# Patient Record
Sex: MALE | Race: WHITE | NOT HISPANIC OR LATINO | Employment: OTHER | URBAN - METROPOLITAN AREA
[De-identification: names, ages, dates, MRNs, and addresses within clinical notes are randomized per-mention and may not be internally consistent; named-entity substitution may affect disease eponyms.]

---

## 2023-10-15 DIAGNOSIS — E11.9 TYPE 2 DIABETES MELLITUS WITHOUT COMPLICATION, WITH LONG-TERM CURRENT USE OF INSULIN (MULTI): ICD-10-CM

## 2023-10-15 DIAGNOSIS — Z79.4 TYPE 2 DIABETES MELLITUS WITHOUT COMPLICATION, WITH LONG-TERM CURRENT USE OF INSULIN (MULTI): ICD-10-CM

## 2023-10-16 RX ORDER — INSULIN ASPART 100 [IU]/ML
INJECTION, SOLUTION INTRAVENOUS; SUBCUTANEOUS
Qty: 50 ML | Refills: 1 | Status: SHIPPED | OUTPATIENT
Start: 2023-10-16 | End: 2024-06-03 | Stop reason: SDUPTHER

## 2023-10-20 DIAGNOSIS — Z79.4 TYPE 2 DIABETES MELLITUS WITHOUT COMPLICATION, WITH LONG-TERM CURRENT USE OF INSULIN (MULTI): ICD-10-CM

## 2023-10-20 DIAGNOSIS — E11.9 TYPE 2 DIABETES MELLITUS WITHOUT COMPLICATION, WITH LONG-TERM CURRENT USE OF INSULIN (MULTI): ICD-10-CM

## 2023-10-22 RX ORDER — HUMAN INSULIN 100 [IU]/ML
INJECTION, SUSPENSION SUBCUTANEOUS
Qty: 60 ML | Refills: 1 | Status: SHIPPED | OUTPATIENT
Start: 2023-10-22 | End: 2024-06-03 | Stop reason: SDUPTHER

## 2023-12-19 DIAGNOSIS — E08.00 DIABETES MELLITUS DUE TO UNDERLYING CONDITION WITH HYPEROSMOLARITY WITHOUT COMA, WITHOUT LONG-TERM CURRENT USE OF INSULIN (MULTI): ICD-10-CM

## 2023-12-20 RX ORDER — CALCIUM CITRATE/VITAMIN D3 200MG-6.25
TABLET ORAL
Qty: 400 STRIP | Refills: 1 | Status: SHIPPED | OUTPATIENT
Start: 2023-12-20 | End: 2024-06-03 | Stop reason: SDUPTHER

## 2024-01-01 DIAGNOSIS — E11.9 TYPE 2 DIABETES MELLITUS WITHOUT COMPLICATION, WITH LONG-TERM CURRENT USE OF INSULIN (MULTI): ICD-10-CM

## 2024-01-01 DIAGNOSIS — Z79.4 TYPE 2 DIABETES MELLITUS WITHOUT COMPLICATION, WITH LONG-TERM CURRENT USE OF INSULIN (MULTI): ICD-10-CM

## 2024-01-03 RX ORDER — PEN NEEDLE, DIABETIC 29 G X1/2"
NEEDLE, DISPOSABLE MISCELLANEOUS
Qty: 300 EACH | Refills: 2 | Status: SHIPPED | OUTPATIENT
Start: 2024-01-03 | End: 2024-06-03 | Stop reason: SDUPTHER

## 2024-06-03 ENCOUNTER — OFFICE VISIT (OUTPATIENT)
Dept: PRIMARY CARE | Facility: CLINIC | Age: 64
End: 2024-06-03
Payer: COMMERCIAL

## 2024-06-03 VITALS
WEIGHT: 142 LBS | SYSTOLIC BLOOD PRESSURE: 116 MMHG | TEMPERATURE: 97.5 F | DIASTOLIC BLOOD PRESSURE: 74 MMHG | BODY MASS INDEX: 19.88 KG/M2 | HEIGHT: 71 IN | OXYGEN SATURATION: 98 % | HEART RATE: 84 BPM

## 2024-06-03 DIAGNOSIS — E11.9 TYPE 2 DIABETES MELLITUS WITHOUT COMPLICATION, WITH LONG-TERM CURRENT USE OF INSULIN (MULTI): ICD-10-CM

## 2024-06-03 DIAGNOSIS — G62.9 NEUROPATHY: Primary | ICD-10-CM

## 2024-06-03 DIAGNOSIS — E08.00 DIABETES MELLITUS DUE TO UNDERLYING CONDITION WITH HYPEROSMOLARITY WITHOUT COMA, WITHOUT LONG-TERM CURRENT USE OF INSULIN (MULTI): ICD-10-CM

## 2024-06-03 DIAGNOSIS — L20.9 ATOPIC DERMATITIS, UNSPECIFIED TYPE: ICD-10-CM

## 2024-06-03 DIAGNOSIS — Z79.4 TYPE 2 DIABETES MELLITUS WITHOUT COMPLICATION, WITH LONG-TERM CURRENT USE OF INSULIN (MULTI): ICD-10-CM

## 2024-06-03 PROCEDURE — 3074F SYST BP LT 130 MM HG: CPT | Performed by: REGISTERED NURSE

## 2024-06-03 PROCEDURE — 1036F TOBACCO NON-USER: CPT | Performed by: REGISTERED NURSE

## 2024-06-03 PROCEDURE — 1160F RVW MEDS BY RX/DR IN RCRD: CPT | Performed by: REGISTERED NURSE

## 2024-06-03 PROCEDURE — 99213 OFFICE O/P EST LOW 20 MIN: CPT | Performed by: REGISTERED NURSE

## 2024-06-03 PROCEDURE — 1125F AMNT PAIN NOTED PAIN PRSNT: CPT | Performed by: REGISTERED NURSE

## 2024-06-03 PROCEDURE — 3078F DIAST BP <80 MM HG: CPT | Performed by: REGISTERED NURSE

## 2024-06-03 PROCEDURE — 1159F MED LIST DOCD IN RCRD: CPT | Performed by: REGISTERED NURSE

## 2024-06-03 RX ORDER — TRIAMCINOLONE ACETONIDE 1 MG/G
CREAM TOPICAL 2 TIMES DAILY
Qty: 15 G | Refills: 0 | Status: SHIPPED | OUTPATIENT
Start: 2024-06-03

## 2024-06-03 RX ORDER — GABAPENTIN 100 MG/1
100 CAPSULE ORAL 3 TIMES DAILY
Qty: 30 CAPSULE | Refills: 0 | Status: SHIPPED | OUTPATIENT
Start: 2024-06-03 | End: 2024-06-13

## 2024-06-03 RX ORDER — HUMAN INSULIN 100 [IU]/ML
INJECTION, SUSPENSION SUBCUTANEOUS
Qty: 60 ML | Refills: 1 | Status: SHIPPED | OUTPATIENT
Start: 2024-06-03

## 2024-06-03 RX ORDER — CALCIUM CITRATE/VITAMIN D3 200MG-6.25
TABLET ORAL
Qty: 400 STRIP | Refills: 1 | Status: SHIPPED | OUTPATIENT
Start: 2024-06-03

## 2024-06-03 RX ORDER — NAPROXEN SODIUM 220 MG
TABLET ORAL
Qty: 300 EACH | Refills: 2 | Status: SHIPPED | OUTPATIENT
Start: 2024-06-03

## 2024-06-03 RX ORDER — INSULIN ASPART 100 [IU]/ML
INJECTION, SOLUTION INTRAVENOUS; SUBCUTANEOUS
Qty: 50 ML | Refills: 1 | Status: SHIPPED | OUTPATIENT
Start: 2024-06-03

## 2024-06-03 ASSESSMENT — COLUMBIA-SUICIDE SEVERITY RATING SCALE - C-SSRS
1. IN THE PAST MONTH, HAVE YOU WISHED YOU WERE DEAD OR WISHED YOU COULD GO TO SLEEP AND NOT WAKE UP?: NO
2. HAVE YOU ACTUALLY HAD ANY THOUGHTS OF KILLING YOURSELF?: NO
6. HAVE YOU EVER DONE ANYTHING, STARTED TO DO ANYTHING, OR PREPARED TO DO ANYTHING TO END YOUR LIFE?: NO

## 2024-06-03 ASSESSMENT — ENCOUNTER SYMPTOMS
OCCASIONAL FEELINGS OF UNSTEADINESS: 0
LOSS OF SENSATION IN FEET: 0
DEPRESSION: 0

## 2024-06-03 ASSESSMENT — PAIN SCALES - GENERAL: PAINLEVEL: 8

## 2024-06-03 ASSESSMENT — PATIENT HEALTH QUESTIONNAIRE - PHQ9
2. FEELING DOWN, DEPRESSED OR HOPELESS: NOT AT ALL
1. LITTLE INTEREST OR PLEASURE IN DOING THINGS: NOT AT ALL
SUM OF ALL RESPONSES TO PHQ9 QUESTIONS 1 AND 2: 0

## 2024-06-03 NOTE — PROGRESS NOTES
"Subjective   Patient ID: Devin Gomez is a 64 y.o. male who presents for Rash (Right shoulder itching, pain, x month, went away for a week, has had for a 3 weeks now. ) and Med Refill (Novolin n refills/True metrix test strips refills).    Rash    Med Refill  Associated symptoms include a rash.      Pt has atopic derm to shoulder area  Review of Systems   Skin:  Positive for rash.   All other systems reviewed and are negative.      Objective   /74 (BP Location: Right arm, Patient Position: Sitting, BP Cuff Size: Adult)   Pulse 84   Temp 36.4 °C (97.5 °F) (Temporal)   Ht 1.803 m (5' 11\")   Wt 64.4 kg (142 lb)   SpO2 98%   BMI 19.80 kg/m²     Physical Exam  Vitals reviewed.   Constitutional:       Appearance: Normal appearance.   Skin:     General: Skin is warm.      Findings: Rash present.   Neurological:      Mental Status: He is alert.   Psychiatric:         Mood and Affect: Mood normal.         Behavior: Behavior normal.         Assessment/Plan   Problem List Items Addressed This Visit    None  Visit Diagnoses         Codes    Neuropathy    -  Primary G62.9    Diabetes mellitus due to underlying condition with hyperosmolarity without coma, without long-term current use of insulin (Multi)     E08.00    Type 2 diabetes mellitus without complication, with long-term current use of insulin (Multi)     E11.9, Z79.4               "

## 2024-10-01 DIAGNOSIS — L20.9 ATOPIC DERMATITIS, UNSPECIFIED TYPE: ICD-10-CM

## 2024-10-02 RX ORDER — TRIAMCINOLONE ACETONIDE 1 MG/G
CREAM TOPICAL 2 TIMES DAILY
Qty: 15 G | Refills: 0 | Status: SHIPPED | OUTPATIENT
Start: 2024-10-02

## 2025-01-21 ENCOUNTER — APPOINTMENT (OUTPATIENT)
Dept: RADIOLOGY | Facility: HOSPITAL | Age: 65
End: 2025-01-21
Payer: COMMERCIAL

## 2025-01-21 ENCOUNTER — HOSPITAL ENCOUNTER (INPATIENT)
Facility: HOSPITAL | Age: 65
End: 2025-01-21
Attending: STUDENT IN AN ORGANIZED HEALTH CARE EDUCATION/TRAINING PROGRAM | Admitting: NURSE PRACTITIONER
Payer: COMMERCIAL

## 2025-01-21 ENCOUNTER — APPOINTMENT (OUTPATIENT)
Dept: CARDIOLOGY | Facility: HOSPITAL | Age: 65
End: 2025-01-21
Payer: COMMERCIAL

## 2025-01-21 DIAGNOSIS — R55 SYNCOPE AND COLLAPSE: ICD-10-CM

## 2025-01-21 DIAGNOSIS — I21.4 NSTEMI (NON-ST ELEVATED MYOCARDIAL INFARCTION) (MULTI): ICD-10-CM

## 2025-01-21 DIAGNOSIS — K92.1 MELENA: Primary | ICD-10-CM

## 2025-01-21 DIAGNOSIS — R11.2 INTRACTABLE NAUSEA AND VOMITING: ICD-10-CM

## 2025-01-21 DIAGNOSIS — R73.9 HYPERGLYCEMIA: ICD-10-CM

## 2025-01-21 DIAGNOSIS — E86.0 DEHYDRATION: ICD-10-CM

## 2025-01-21 LAB
ALBUMIN SERPL BCP-MCNC: 4.5 G/DL (ref 3.4–5)
ALP SERPL-CCNC: 88 U/L (ref 33–136)
ALT SERPL W P-5'-P-CCNC: 32 U/L (ref 10–52)
ANION GAP BLDA CALCULATED.4IONS-SCNC: 15 MMO/L (ref 10–25)
ANION GAP BLDV CALCULATED.4IONS-SCNC: 15 MMOL/L (ref 10–25)
ANION GAP SERPL CALCULATED.3IONS-SCNC: 22 MMOL/L (ref 10–20)
ANION GAP SERPL CALCULATED.3IONS-SCNC: 22 MMOL/L (ref 10–20)
APPEARANCE UR: CLEAR
ARTERIAL PATENCY WRIST A: ABNORMAL
AST SERPL W P-5'-P-CCNC: 17 U/L (ref 9–39)
B-OH-BUTYR SERPL-SCNC: 2.47 MMOL/L (ref 0.02–0.27)
BASE EXCESS BLDA CALC-SCNC: -3.5 MMOL/L (ref -2–3)
BASE EXCESS BLDV CALC-SCNC: -8.4 MMOL/L (ref -2–3)
BASOPHILS # BLD AUTO: 0.02 X10*3/UL (ref 0–0.1)
BASOPHILS NFR BLD AUTO: 0.2 %
BILIRUB SERPL-MCNC: 1.1 MG/DL (ref 0–1.2)
BILIRUB UR STRIP.AUTO-MCNC: NEGATIVE MG/DL
BNP SERPL-MCNC: 303 PG/ML (ref 0–99)
BODY TEMPERATURE: 37 DEGREES CELSIUS
BODY TEMPERATURE: 37 DEGREES CELSIUS
BUN SERPL-MCNC: 23 MG/DL (ref 6–23)
BUN SERPL-MCNC: 23 MG/DL (ref 6–23)
CA-I BLDA-SCNC: 1.19 MMOL/L (ref 1.1–1.33)
CA-I BLDV-SCNC: 1.15 MMOL/L (ref 1.1–1.33)
CALCIUM SERPL-MCNC: 9 MG/DL (ref 8.6–10.3)
CALCIUM SERPL-MCNC: 9.8 MG/DL (ref 8.6–10.3)
CARDIAC TROPONIN I PNL SERPL HS: 16 NG/L (ref 0–20)
CARDIAC TROPONIN I PNL SERPL HS: 18 NG/L (ref 0–20)
CARDIAC TROPONIN I PNL SERPL HS: 59 NG/L (ref 0–20)
CHLORIDE BLDA-SCNC: 101 MMOL/L (ref 98–107)
CHLORIDE BLDV-SCNC: 105 MMOL/L (ref 98–107)
CHLORIDE SERPL-SCNC: 100 MMOL/L (ref 98–107)
CHLORIDE SERPL-SCNC: 99 MMOL/L (ref 98–107)
CO2 SERPL-SCNC: 16 MMOL/L (ref 21–32)
CO2 SERPL-SCNC: 18 MMOL/L (ref 21–32)
COLOR UR: COLORLESS
CREAT SERPL-MCNC: 1.13 MG/DL (ref 0.5–1.3)
CREAT SERPL-MCNC: 1.27 MG/DL (ref 0.5–1.3)
EGFRCR SERPLBLD CKD-EPI 2021: 62 ML/MIN/1.73M*2
EGFRCR SERPLBLD CKD-EPI 2021: 72 ML/MIN/1.73M*2
EOSINOPHIL # BLD AUTO: 0 X10*3/UL (ref 0–0.7)
EOSINOPHIL NFR BLD AUTO: 0 %
ERYTHROCYTE [DISTWIDTH] IN BLOOD BY AUTOMATED COUNT: 12.7 % (ref 11.5–14.5)
FLUAV RNA RESP QL NAA+PROBE: NOT DETECTED
FLUBV RNA RESP QL NAA+PROBE: NOT DETECTED
GLUCOSE BLD MANUAL STRIP-MCNC: 283 MG/DL (ref 74–99)
GLUCOSE BLD MANUAL STRIP-MCNC: 295 MG/DL (ref 74–99)
GLUCOSE BLD MANUAL STRIP-MCNC: 297 MG/DL (ref 74–99)
GLUCOSE BLD MANUAL STRIP-MCNC: 319 MG/DL (ref 74–99)
GLUCOSE BLD MANUAL STRIP-MCNC: 319 MG/DL (ref 74–99)
GLUCOSE BLDA-MCNC: 362 MG/DL (ref 74–99)
GLUCOSE BLDV-MCNC: 323 MG/DL (ref 74–99)
GLUCOSE SERPL-MCNC: 336 MG/DL (ref 74–99)
GLUCOSE SERPL-MCNC: 348 MG/DL (ref 74–99)
GLUCOSE UR STRIP.AUTO-MCNC: ABNORMAL MG/DL
HCO3 BLDA-SCNC: 18.6 MMOL/L (ref 22–26)
HCO3 BLDV-SCNC: 16.2 MMOL/L (ref 22–26)
HCT VFR BLD AUTO: 39.8 % (ref 41–52)
HCT VFR BLD EST: 36 % (ref 41–52)
HCT VFR BLD EST: 36 % (ref 41–52)
HGB BLD-MCNC: 13.5 G/DL (ref 13.5–17.5)
HGB BLDA-MCNC: 12.1 G/DL (ref 13.5–17.5)
HGB BLDV-MCNC: 12.1 G/DL (ref 13.5–17.5)
IMM GRANULOCYTES # BLD AUTO: 0.03 X10*3/UL (ref 0–0.7)
IMM GRANULOCYTES NFR BLD AUTO: 0.3 % (ref 0–0.9)
INHALED O2 CONCENTRATION: 21 %
INHALED O2 CONCENTRATION: 21 %
KETONES UR STRIP.AUTO-MCNC: ABNORMAL MG/DL
LACTATE BLDA-SCNC: 3.3 MMOL/L (ref 0.4–2)
LACTATE BLDV-SCNC: 3.9 MMOL/L (ref 0.4–2)
LACTATE BLDV-SCNC: 4.1 MMOL/L (ref 0.4–2)
LACTATE BLDV-SCNC: 4.3 MMOL/L (ref 0.4–2)
LACTATE BLDV-SCNC: 5.1 MMOL/L (ref 0.4–2)
LACTATE SERPL-SCNC: 3.6 MMOL/L (ref 0.4–2)
LEUKOCYTE ESTERASE UR QL STRIP.AUTO: NEGATIVE
LIPASE SERPL-CCNC: 5 U/L (ref 9–82)
LYMPHOCYTES # BLD AUTO: 0.61 X10*3/UL (ref 1.2–4.8)
LYMPHOCYTES NFR BLD AUTO: 7 %
MCH RBC QN AUTO: 28.4 PG (ref 26–34)
MCHC RBC AUTO-ENTMCNC: 33.9 G/DL (ref 32–36)
MCV RBC AUTO: 84 FL (ref 80–100)
MONOCYTES # BLD AUTO: 0.29 X10*3/UL (ref 0.1–1)
MONOCYTES NFR BLD AUTO: 3.3 %
NEUTROPHILS # BLD AUTO: 7.79 X10*3/UL (ref 1.2–7.7)
NEUTROPHILS NFR BLD AUTO: 89.2 %
NITRITE UR QL STRIP.AUTO: NEGATIVE
NRBC BLD-RTO: 0 /100 WBCS (ref 0–0)
OXYHGB MFR BLDA: 96.8 % (ref 94–98)
OXYHGB MFR BLDV: 94.8 % (ref 45–75)
PCO2 BLDA: 25 MM HG (ref 38–42)
PCO2 BLDV: 30 MM HG (ref 41–51)
PH BLDA: 7.48 PH (ref 7.38–7.42)
PH BLDV: 7.34 PH (ref 7.33–7.43)
PH UR STRIP.AUTO: 5.5 [PH]
PLATELET # BLD AUTO: 192 X10*3/UL (ref 150–450)
PO2 BLDA: 102 MM HG (ref 85–95)
PO2 BLDV: 73 MM HG (ref 35–45)
POTASSIUM BLDA-SCNC: 4.9 MMOL/L (ref 3.5–5.3)
POTASSIUM BLDV-SCNC: 4.2 MMOL/L (ref 3.5–5.3)
POTASSIUM SERPL-SCNC: 4.5 MMOL/L (ref 3.5–5.3)
POTASSIUM SERPL-SCNC: 4.6 MMOL/L (ref 3.5–5.3)
PROT SERPL-MCNC: 7.2 G/DL (ref 6.4–8.2)
PROT UR STRIP.AUTO-MCNC: NEGATIVE MG/DL
RBC # BLD AUTO: 4.76 X10*6/UL (ref 4.5–5.9)
RBC # UR STRIP.AUTO: NEGATIVE /UL
RSV RNA RESP QL NAA+PROBE: NOT DETECTED
SAO2 % BLDA: 99 % (ref 94–100)
SAO2 % BLDV: 96 % (ref 45–75)
SARS-COV-2 RNA RESP QL NAA+PROBE: NOT DETECTED
SODIUM BLDA-SCNC: 130 MMOL/L (ref 136–145)
SODIUM BLDV-SCNC: 132 MMOL/L (ref 136–145)
SODIUM SERPL-SCNC: 133 MMOL/L (ref 136–145)
SODIUM SERPL-SCNC: 134 MMOL/L (ref 136–145)
SP GR UR STRIP.AUTO: 1.02
SPECIMEN DRAWN FROM PATIENT: ABNORMAL
TSH SERPL-ACNC: 2.2 MIU/L (ref 0.44–3.98)
UROBILINOGEN UR STRIP.AUTO-MCNC: NORMAL MG/DL
WBC # BLD AUTO: 8.7 X10*3/UL (ref 4.4–11.3)

## 2025-01-21 PROCEDURE — 74177 CT ABD & PELVIS W/CONTRAST: CPT

## 2025-01-21 PROCEDURE — 99291 CRITICAL CARE FIRST HOUR: CPT | Mod: 25 | Performed by: STUDENT IN AN ORGANIZED HEALTH CARE EDUCATION/TRAINING PROGRAM

## 2025-01-21 PROCEDURE — 99285 EMERGENCY DEPT VISIT HI MDM: CPT | Mod: 25 | Performed by: STUDENT IN AN ORGANIZED HEALTH CARE EDUCATION/TRAINING PROGRAM

## 2025-01-21 PROCEDURE — 96361 HYDRATE IV INFUSION ADD-ON: CPT

## 2025-01-21 PROCEDURE — 82947 ASSAY GLUCOSE BLOOD QUANT: CPT

## 2025-01-21 PROCEDURE — 71045 X-RAY EXAM CHEST 1 VIEW: CPT

## 2025-01-21 PROCEDURE — 83605 ASSAY OF LACTIC ACID: CPT | Performed by: STUDENT IN AN ORGANIZED HEALTH CARE EDUCATION/TRAINING PROGRAM

## 2025-01-21 PROCEDURE — 80053 COMPREHEN METABOLIC PANEL: CPT | Performed by: STUDENT IN AN ORGANIZED HEALTH CARE EDUCATION/TRAINING PROGRAM

## 2025-01-21 PROCEDURE — 82010 KETONE BODYS QUAN: CPT | Performed by: STUDENT IN AN ORGANIZED HEALTH CARE EDUCATION/TRAINING PROGRAM

## 2025-01-21 PROCEDURE — 93005 ELECTROCARDIOGRAM TRACING: CPT

## 2025-01-21 PROCEDURE — 2500000004 HC RX 250 GENERAL PHARMACY W/ HCPCS (ALT 636 FOR OP/ED): Performed by: NURSE PRACTITIONER

## 2025-01-21 PROCEDURE — 84132 ASSAY OF SERUM POTASSIUM: CPT | Performed by: STUDENT IN AN ORGANIZED HEALTH CARE EDUCATION/TRAINING PROGRAM

## 2025-01-21 PROCEDURE — 2550000001 HC RX 255 CONTRASTS: Performed by: STUDENT IN AN ORGANIZED HEALTH CARE EDUCATION/TRAINING PROGRAM

## 2025-01-21 PROCEDURE — 83605 ASSAY OF LACTIC ACID: CPT | Performed by: NURSE PRACTITIONER

## 2025-01-21 PROCEDURE — 81003 URINALYSIS AUTO W/O SCOPE: CPT | Performed by: STUDENT IN AN ORGANIZED HEALTH CARE EDUCATION/TRAINING PROGRAM

## 2025-01-21 PROCEDURE — 2060000001 HC INTERMEDIATE ICU ROOM DAILY

## 2025-01-21 PROCEDURE — 85025 COMPLETE CBC W/AUTO DIFF WBC: CPT | Performed by: STUDENT IN AN ORGANIZED HEALTH CARE EDUCATION/TRAINING PROGRAM

## 2025-01-21 PROCEDURE — 96376 TX/PRO/DX INJ SAME DRUG ADON: CPT

## 2025-01-21 PROCEDURE — 84484 ASSAY OF TROPONIN QUANT: CPT | Performed by: STUDENT IN AN ORGANIZED HEALTH CARE EDUCATION/TRAINING PROGRAM

## 2025-01-21 PROCEDURE — 83036 HEMOGLOBIN GLYCOSYLATED A1C: CPT | Mod: TRILAB | Performed by: NURSE PRACTITIONER

## 2025-01-21 PROCEDURE — 84484 ASSAY OF TROPONIN QUANT: CPT | Performed by: NURSE PRACTITIONER

## 2025-01-21 PROCEDURE — 36415 COLL VENOUS BLD VENIPUNCTURE: CPT | Performed by: STUDENT IN AN ORGANIZED HEALTH CARE EDUCATION/TRAINING PROGRAM

## 2025-01-21 PROCEDURE — 71045 X-RAY EXAM CHEST 1 VIEW: CPT | Mod: FOREIGN READ | Performed by: RADIOLOGY

## 2025-01-21 PROCEDURE — 84132 ASSAY OF SERUM POTASSIUM: CPT | Performed by: NURSE PRACTITIONER

## 2025-01-21 PROCEDURE — 96375 TX/PRO/DX INJ NEW DRUG ADDON: CPT

## 2025-01-21 PROCEDURE — 84443 ASSAY THYROID STIM HORMONE: CPT | Performed by: STUDENT IN AN ORGANIZED HEALTH CARE EDUCATION/TRAINING PROGRAM

## 2025-01-21 PROCEDURE — 83690 ASSAY OF LIPASE: CPT | Performed by: STUDENT IN AN ORGANIZED HEALTH CARE EDUCATION/TRAINING PROGRAM

## 2025-01-21 PROCEDURE — 87075 CULTR BACTERIA EXCEPT BLOOD: CPT | Mod: TRILAB | Performed by: NURSE PRACTITIONER

## 2025-01-21 PROCEDURE — 87637 SARSCOV2&INF A&B&RSV AMP PRB: CPT | Performed by: STUDENT IN AN ORGANIZED HEALTH CARE EDUCATION/TRAINING PROGRAM

## 2025-01-21 PROCEDURE — 2500000004 HC RX 250 GENERAL PHARMACY W/ HCPCS (ALT 636 FOR OP/ED): Performed by: STUDENT IN AN ORGANIZED HEALTH CARE EDUCATION/TRAINING PROGRAM

## 2025-01-21 PROCEDURE — 74177 CT ABD & PELVIS W/CONTRAST: CPT | Mod: FOREIGN READ | Performed by: RADIOLOGY

## 2025-01-21 PROCEDURE — 2500000004 HC RX 250 GENERAL PHARMACY W/ HCPCS (ALT 636 FOR OP/ED)

## 2025-01-21 PROCEDURE — 2500000004 HC RX 250 GENERAL PHARMACY W/ HCPCS (ALT 636 FOR OP/ED): Performed by: REGISTERED NURSE

## 2025-01-21 PROCEDURE — 83605 ASSAY OF LACTIC ACID: CPT

## 2025-01-21 PROCEDURE — 99223 1ST HOSP IP/OBS HIGH 75: CPT | Performed by: NURSE PRACTITIONER

## 2025-01-21 PROCEDURE — 36600 WITHDRAWAL OF ARTERIAL BLOOD: CPT

## 2025-01-21 PROCEDURE — 96374 THER/PROPH/DIAG INJ IV PUSH: CPT

## 2025-01-21 PROCEDURE — 83880 ASSAY OF NATRIURETIC PEPTIDE: CPT | Performed by: STUDENT IN AN ORGANIZED HEALTH CARE EDUCATION/TRAINING PROGRAM

## 2025-01-21 RX ORDER — ACETAMINOPHEN 160 MG/5ML
650 SOLUTION ORAL EVERY 4 HOURS PRN
Status: DISCONTINUED | OUTPATIENT
Start: 2025-01-21 | End: 2025-02-05 | Stop reason: HOSPADM

## 2025-01-21 RX ORDER — ACETAMINOPHEN 500 MG
5 TABLET ORAL NIGHTLY PRN
Status: DISCONTINUED | OUTPATIENT
Start: 2025-01-21 | End: 2025-02-05 | Stop reason: HOSPADM

## 2025-01-21 RX ORDER — ONDANSETRON 4 MG/1
4 TABLET, ORALLY DISINTEGRATING ORAL EVERY 8 HOURS PRN
Status: DISCONTINUED | OUTPATIENT
Start: 2025-01-21 | End: 2025-02-05 | Stop reason: HOSPADM

## 2025-01-21 RX ORDER — ONDANSETRON HYDROCHLORIDE 2 MG/ML
4 INJECTION, SOLUTION INTRAVENOUS EVERY 8 HOURS PRN
Status: DISCONTINUED | OUTPATIENT
Start: 2025-01-21 | End: 2025-02-05 | Stop reason: HOSPADM

## 2025-01-21 RX ORDER — ACETAMINOPHEN 325 MG/1
650 TABLET ORAL EVERY 4 HOURS PRN
Status: DISCONTINUED | OUTPATIENT
Start: 2025-01-21 | End: 2025-02-05 | Stop reason: HOSPADM

## 2025-01-21 RX ORDER — ONDANSETRON HYDROCHLORIDE 2 MG/ML
4 INJECTION, SOLUTION INTRAVENOUS ONCE
Status: COMPLETED | OUTPATIENT
Start: 2025-01-21 | End: 2025-01-21

## 2025-01-21 RX ORDER — DEXTROSE 50 % IN WATER (D50W) INTRAVENOUS SYRINGE
12.5
Status: DISCONTINUED | OUTPATIENT
Start: 2025-01-21 | End: 2025-02-03

## 2025-01-21 RX ORDER — SODIUM CHLORIDE 450 MG/100ML
150 INJECTION, SOLUTION INTRAVENOUS CONTINUOUS
Status: DISCONTINUED | OUTPATIENT
Start: 2025-01-21 | End: 2025-01-22

## 2025-01-21 RX ORDER — ACETAMINOPHEN 650 MG/1
650 SUPPOSITORY RECTAL EVERY 4 HOURS PRN
Status: DISCONTINUED | OUTPATIENT
Start: 2025-01-21 | End: 2025-02-05 | Stop reason: HOSPADM

## 2025-01-21 RX ORDER — PROCHLORPERAZINE EDISYLATE 5 MG/ML
5 INJECTION INTRAMUSCULAR; INTRAVENOUS ONCE
Status: COMPLETED | OUTPATIENT
Start: 2025-01-21 | End: 2025-01-21

## 2025-01-21 RX ORDER — DEXTROSE 50 % IN WATER (D50W) INTRAVENOUS SYRINGE
25
Status: DISCONTINUED | OUTPATIENT
Start: 2025-01-21 | End: 2025-02-03

## 2025-01-21 RX ORDER — HYDRALAZINE HYDROCHLORIDE 20 MG/ML
5 INJECTION INTRAMUSCULAR; INTRAVENOUS EVERY 4 HOURS PRN
Status: DISCONTINUED | OUTPATIENT
Start: 2025-01-21 | End: 2025-02-05 | Stop reason: HOSPADM

## 2025-01-21 RX ORDER — DEXTROSE 50 % IN WATER (D50W) INTRAVENOUS SYRINGE
12.5
Status: DISCONTINUED | OUTPATIENT
Start: 2025-01-21 | End: 2025-01-21

## 2025-01-21 RX ORDER — ENOXAPARIN SODIUM 100 MG/ML
40 INJECTION SUBCUTANEOUS DAILY
Status: DISCONTINUED | OUTPATIENT
Start: 2025-01-21 | End: 2025-01-22

## 2025-01-21 RX ORDER — DEXTROSE 50 % IN WATER (D50W) INTRAVENOUS SYRINGE
25
Status: DISCONTINUED | OUTPATIENT
Start: 2025-01-21 | End: 2025-01-21

## 2025-01-21 RX ORDER — INSULIN LISPRO 100 [IU]/ML
0-10 INJECTION, SOLUTION INTRAVENOUS; SUBCUTANEOUS
Status: DISCONTINUED | OUTPATIENT
Start: 2025-01-22 | End: 2025-01-22

## 2025-01-21 RX ORDER — METOCLOPRAMIDE HYDROCHLORIDE 5 MG/ML
10 INJECTION INTRAMUSCULAR; INTRAVENOUS ONCE
Status: COMPLETED | OUTPATIENT
Start: 2025-01-21 | End: 2025-01-21

## 2025-01-21 RX ADMIN — PIPERACILLIN SODIUM AND TAZOBACTAM SODIUM 3.38 G: 3; .375 INJECTION, SOLUTION INTRAVENOUS at 20:53

## 2025-01-21 RX ADMIN — ONDANSETRON 4 MG: 2 INJECTION INTRAMUSCULAR; INTRAVENOUS at 12:06

## 2025-01-21 RX ADMIN — PROMETHAZINE HYDROCHLORIDE 12.5 MG: 25 INJECTION INTRAMUSCULAR; INTRAVENOUS at 22:51

## 2025-01-21 RX ADMIN — SODIUM CHLORIDE, SODIUM LACTATE, POTASSIUM CHLORIDE, AND CALCIUM CHLORIDE 1000 ML: 600; 310; 30; 20 INJECTION, SOLUTION INTRAVENOUS at 13:22

## 2025-01-21 RX ADMIN — METOCLOPRAMIDE 10 MG: 5 INJECTION, SOLUTION INTRAMUSCULAR; INTRAVENOUS at 12:45

## 2025-01-21 RX ADMIN — SODIUM CHLORIDE 1000 ML: 900 INJECTION, SOLUTION INTRAVENOUS at 12:06

## 2025-01-21 RX ADMIN — SODIUM CHLORIDE, POTASSIUM CHLORIDE, SODIUM LACTATE AND CALCIUM CHLORIDE 1000 ML: 600; 310; 30; 20 INJECTION, SOLUTION INTRAVENOUS at 17:18

## 2025-01-21 RX ADMIN — PROCHLORPERAZINE EDISYLATE 5 MG: 5 INJECTION INTRAMUSCULAR; INTRAVENOUS at 18:08

## 2025-01-21 RX ADMIN — IOHEXOL 75 ML: 350 INJECTION, SOLUTION INTRAVENOUS at 13:43

## 2025-01-21 RX ADMIN — ONDANSETRON 4 MG: 2 INJECTION INTRAMUSCULAR; INTRAVENOUS at 16:42

## 2025-01-21 RX ADMIN — SODIUM CHLORIDE 150 ML/HR: 450 INJECTION, SOLUTION INTRAVENOUS at 20:20

## 2025-01-21 SDOH — ECONOMIC STABILITY: FOOD INSECURITY: WITHIN THE PAST 12 MONTHS, THE FOOD YOU BOUGHT JUST DIDN'T LAST AND YOU DIDN'T HAVE MONEY TO GET MORE.: NEVER TRUE

## 2025-01-21 SDOH — ECONOMIC STABILITY: HOUSING INSECURITY: AT ANY TIME IN THE PAST 12 MONTHS, WERE YOU HOMELESS OR LIVING IN A SHELTER (INCLUDING NOW)?: NO

## 2025-01-21 SDOH — SOCIAL STABILITY: SOCIAL INSECURITY: DO YOU FEEL ANYONE HAS EXPLOITED OR TAKEN ADVANTAGE OF YOU FINANCIALLY OR OF YOUR PERSONAL PROPERTY?: NO

## 2025-01-21 SDOH — ECONOMIC STABILITY: FOOD INSECURITY: HOW HARD IS IT FOR YOU TO PAY FOR THE VERY BASICS LIKE FOOD, HOUSING, MEDICAL CARE, AND HEATING?: NOT VERY HARD

## 2025-01-21 SDOH — SOCIAL STABILITY: SOCIAL INSECURITY: WERE YOU ABLE TO COMPLETE ALL THE BEHAVIORAL HEALTH SCREENINGS?: YES

## 2025-01-21 SDOH — ECONOMIC STABILITY: HOUSING INSECURITY: IN THE PAST 12 MONTHS, HOW MANY TIMES HAVE YOU MOVED WHERE YOU WERE LIVING?: 0

## 2025-01-21 SDOH — ECONOMIC STABILITY: FOOD INSECURITY: WITHIN THE PAST 12 MONTHS, YOU WORRIED THAT YOUR FOOD WOULD RUN OUT BEFORE YOU GOT THE MONEY TO BUY MORE.: NEVER TRUE

## 2025-01-21 SDOH — SOCIAL STABILITY: SOCIAL INSECURITY: HAS ANYONE EVER THREATENED TO HURT YOUR FAMILY OR YOUR PETS?: NO

## 2025-01-21 SDOH — SOCIAL STABILITY: SOCIAL INSECURITY: HAVE YOU HAD THOUGHTS OF HARMING ANYONE ELSE?: NO

## 2025-01-21 SDOH — ECONOMIC STABILITY: TRANSPORTATION INSECURITY: IN THE PAST 12 MONTHS, HAS LACK OF TRANSPORTATION KEPT YOU FROM MEDICAL APPOINTMENTS OR FROM GETTING MEDICATIONS?: NO

## 2025-01-21 SDOH — SOCIAL STABILITY: SOCIAL INSECURITY: ARE THERE ANY APPARENT SIGNS OF INJURIES/BEHAVIORS THAT COULD BE RELATED TO ABUSE/NEGLECT?: NO

## 2025-01-21 SDOH — ECONOMIC STABILITY: HOUSING INSECURITY: IN THE LAST 12 MONTHS, WAS THERE A TIME WHEN YOU WERE NOT ABLE TO PAY THE MORTGAGE OR RENT ON TIME?: NO

## 2025-01-21 SDOH — SOCIAL STABILITY: SOCIAL INSECURITY: DOES ANYONE TRY TO KEEP YOU FROM HAVING/CONTACTING OTHER FRIENDS OR DOING THINGS OUTSIDE YOUR HOME?: NO

## 2025-01-21 SDOH — SOCIAL STABILITY: SOCIAL INSECURITY: ABUSE: ADULT

## 2025-01-21 SDOH — SOCIAL STABILITY: SOCIAL INSECURITY: DO YOU FEEL UNSAFE GOING BACK TO THE PLACE WHERE YOU ARE LIVING?: NO

## 2025-01-21 SDOH — SOCIAL STABILITY: SOCIAL INSECURITY: ARE YOU OR HAVE YOU BEEN THREATENED OR ABUSED PHYSICALLY, EMOTIONALLY, OR SEXUALLY BY ANYONE?: NO

## 2025-01-21 SDOH — SOCIAL STABILITY: SOCIAL INSECURITY: HAVE YOU HAD ANY THOUGHTS OF HARMING ANYONE ELSE?: NO

## 2025-01-21 ASSESSMENT — PAIN SCALES - GENERAL
PAINLEVEL_OUTOF10: 0 - NO PAIN
PAINLEVEL_OUTOF10: 0 - NO PAIN

## 2025-01-21 ASSESSMENT — PATIENT HEALTH QUESTIONNAIRE - PHQ9
2. FEELING DOWN, DEPRESSED OR HOPELESS: NOT AT ALL
1. LITTLE INTEREST OR PLEASURE IN DOING THINGS: NOT AT ALL
SUM OF ALL RESPONSES TO PHQ9 QUESTIONS 1 & 2: 0

## 2025-01-21 ASSESSMENT — LIFESTYLE VARIABLES
AUDIT-C TOTAL SCORE: 0
SUBSTANCE_ABUSE_PAST_12_MONTHS: NO
HOW OFTEN DO YOU HAVE A DRINK CONTAINING ALCOHOL: NEVER
HOW MANY STANDARD DRINKS CONTAINING ALCOHOL DO YOU HAVE ON A TYPICAL DAY: PATIENT DOES NOT DRINK
HOW OFTEN DO YOU HAVE 6 OR MORE DRINKS ON ONE OCCASION: NEVER
PRESCIPTION_ABUSE_PAST_12_MONTHS: NO
SKIP TO QUESTIONS 9-10: 1
AUDIT-C TOTAL SCORE: 0

## 2025-01-21 ASSESSMENT — ACTIVITIES OF DAILY LIVING (ADL)
LACK_OF_TRANSPORTATION: NO
HEARING - LEFT EAR: FUNCTIONAL
HEARING - RIGHT EAR: FUNCTIONAL
FEEDING YOURSELF: INDEPENDENT
BATHING: INDEPENDENT
GROOMING: INDEPENDENT
WALKS IN HOME: INDEPENDENT
ADEQUATE_TO_COMPLETE_ADL: YES
TOILETING: INDEPENDENT
PATIENT'S MEMORY ADEQUATE TO SAFELY COMPLETE DAILY ACTIVITIES?: YES
JUDGMENT_ADEQUATE_SAFELY_COMPLETE_DAILY_ACTIVITIES: YES
DRESSING YOURSELF: INDEPENDENT
ASSISTIVE_DEVICE: WALKER

## 2025-01-21 ASSESSMENT — COGNITIVE AND FUNCTIONAL STATUS - GENERAL
STANDING UP FROM CHAIR USING ARMS: A LITTLE
MOBILITY SCORE: 20
DAILY ACTIVITIY SCORE: 20
TOILETING: A LITTLE
MOVING TO AND FROM BED TO CHAIR: A LITTLE
CLIMB 3 TO 5 STEPS WITH RAILING: A LITTLE
DRESSING REGULAR LOWER BODY CLOTHING: A LITTLE
PATIENT BASELINE BEDBOUND: NO
HELP NEEDED FOR BATHING: A LITTLE
WALKING IN HOSPITAL ROOM: A LITTLE
DRESSING REGULAR UPPER BODY CLOTHING: A LITTLE

## 2025-01-21 ASSESSMENT — PAIN - FUNCTIONAL ASSESSMENT
PAIN_FUNCTIONAL_ASSESSMENT: 0-10
PAIN_FUNCTIONAL_ASSESSMENT: 0-10

## 2025-01-21 ASSESSMENT — COLUMBIA-SUICIDE SEVERITY RATING SCALE - C-SSRS
2. HAVE YOU ACTUALLY HAD ANY THOUGHTS OF KILLING YOURSELF?: NO
6. HAVE YOU EVER DONE ANYTHING, STARTED TO DO ANYTHING, OR PREPARED TO DO ANYTHING TO END YOUR LIFE?: NO
1. IN THE PAST MONTH, HAVE YOU WISHED YOU WERE DEAD OR WISHED YOU COULD GO TO SLEEP AND NOT WAKE UP?: NO

## 2025-01-21 NOTE — ED PROVIDER NOTES
HPI   Chief Complaint   Patient presents with    Hyperglycemia     Pt bib ems for hyperglycemia, felt nauseous but no vomiting, denies sob, bs 300 on arrival       HPI  Patient is a 64-year-old male with history of type 1 diabetes brought in by EMS for evaluation of hyperglycemia and general malaise.  Patient states that starting yesterday morning he has generally felt very unwell and has been nauseous and dry heaves.  Does endorse some diffuse abdominal cramping.  States that he feels chilled.  He states his blood sugar was in the 500s last night and he took extra insulin but his blood sugar is still elevated today.  He states he has never felt this way before. denies chest pain or shortness of breath.  Denies urinary symptoms.  States that he has had a few episodes of loose stools over the past few days denies any blood or mucus in the stool.  States that his neighbor did have similar symptoms and he was with him approximately a week ago.  Otherwise has no acute complaints.      Patient History   Past Medical History:   Diagnosis Date    Diabetes mellitus (Multi)      No past surgical history on file.  No family history on file.  Social History     Tobacco Use    Smoking status: Never    Smokeless tobacco: Never   Vaping Use    Vaping status: Never Used   Substance Use Topics    Alcohol use: Not Currently    Drug use: Not Currently       Physical Exam   ED Triage Vitals   Temperature Heart Rate Respirations BP   01/21/25 1207 01/21/25 1207 01/21/25 1207 01/21/25 1207   36.8 °C (98.2 °F) 90 18 178/88      Pulse Ox Temp Source Heart Rate Source Patient Position   01/21/25 1207 01/21/25 1227 -- --   98 % Temporal        BP Location FiO2 (%)     -- --             Physical Exam  Vitals and nursing note reviewed.   Constitutional:       Appearance: He is well-developed. He is ill-appearing.      Comments: Actively dry heaving during exam   HENT:      Head: Normocephalic and atraumatic.      Mouth/Throat:      Mouth:  Mucous membranes are dry.   Eyes:      Conjunctiva/sclera: Conjunctivae normal.   Cardiovascular:      Rate and Rhythm: Normal rate and regular rhythm.      Heart sounds: No murmur heard.  Pulmonary:      Effort: Pulmonary effort is normal. No respiratory distress.      Breath sounds: Normal breath sounds.   Abdominal:      Palpations: Abdomen is soft.      Tenderness: There is no abdominal tenderness.   Musculoskeletal:         General: No swelling.      Cervical back: Neck supple.   Skin:     General: Skin is warm and dry.      Capillary Refill: Capillary refill takes less than 2 seconds.   Neurological:      Mental Status: He is alert.   Psychiatric:         Mood and Affect: Mood normal.           ED Course & MDM   ED Course as of 01/21/25 1747   Tue Jan 21, 2025   1225 EKG on my interpretation shows normal sinus rhythm, rate of 99 beats minute.  Normal axis.  QTc 472 ms, OR interval 146.  Subtle upsloping ST depression in inferior leads, no ST elevation present.  No STEMI.  Nonspecific ST abnormality.  No prior EKG available in the EMR for comparison. [NT]      ED Course User Index  [NT] Elieser Boudreaux DO         Diagnoses as of 01/21/25 1747   Dehydration   Hyperglycemia   Intractable nausea and vomiting                 No data recorded     Kike Coma Scale Score: 15 (01/21/25 1209 : Jae Byrne RN)                           Medical Decision Making  Parts of this chart have been completed using voice recognition software. Please excuse any errors of transcription.  My thought process and reason for plan has been formulated from the time that I saw the patient until the time of disposition and is not specific to one specific moment during their visit and furthermore my MDM encompasses this entire chart and not only this text box.      HPI: Detailed above.    Exam: A medically appropriate exam performed, outlined above, given the known history and presentation.    History obtained from:  Patient    Social Determinants of Health considered during this visit: Lives independently    Medications given during visit:  Medications   lactated Ringer's bolus 1,000 mL (1,000 mL intravenous New Bag 1/21/25 1718)   prochlorperazine (Compazine) injection 5 mg (has no administration in time range)   sodium chloride 0.9 % bolus 1,000 mL (0 mL intravenous Stopped 1/21/25 1238)   ondansetron (Zofran) injection 4 mg (4 mg intravenous Given 1/21/25 1206)   metoclopramide (Reglan) injection 10 mg (10 mg intravenous Given 1/21/25 1245)   lactated Ringer's bolus 1,000 mL (0 mL intravenous Stopped 1/21/25 1555)   iohexol (OMNIPaque) 350 mg iodine/mL solution 75 mL (75 mL intravenous Given 1/21/25 1343)   ondansetron (Zofran) injection 4 mg (4 mg intravenous Given 1/21/25 1642)        Diagnostic/tests  Labs Reviewed   CBC WITH AUTO DIFFERENTIAL - Abnormal       Result Value    WBC 8.7      nRBC 0.0      RBC 4.76      Hemoglobin 13.5      Hematocrit 39.8 (*)     MCV 84      MCH 28.4      MCHC 33.9      RDW 12.7      Platelets 192      Neutrophils % 89.2      Immature Granulocytes %, Automated 0.3      Lymphocytes % 7.0      Monocytes % 3.3      Eosinophils % 0.0      Basophils % 0.2      Neutrophils Absolute 7.79 (*)     Immature Granulocytes Absolute, Automated 0.03      Lymphocytes Absolute 0.61 (*)     Monocytes Absolute 0.29      Eosinophils Absolute 0.00      Basophils Absolute 0.02     COMPREHENSIVE METABOLIC PANEL - Abnormal    Glucose 348 (*)     Sodium 134 (*)     Potassium 4.6      Chloride 99      Bicarbonate 18 (*)     Anion Gap 22 (*)     Urea Nitrogen 23      Creatinine 1.27      eGFR 62      Calcium 9.8      Albumin 4.5      Alkaline Phosphatase 88      Total Protein 7.2      AST 17      Bilirubin, Total 1.1      ALT 32     LIPASE - Abnormal    Lipase 5 (*)     Narrative:     Venipuncture immediately after or during the administration of Metamizole may lead to falsely low results. Testing should be performed  immediately prior to Metamizole dosing.   BLOOD GAS VENOUS FULL PANEL - Abnormal    POCT pH, Venous 7.34      POCT pCO2, Venous 30 (*)     POCT pO2, Venous 73 (*)     POCT SO2, Venous 96 (*)     POCT Oxy Hemoglobin, Venous 94.8 (*)     POCT Hematocrit Calculated, Venous 36.0 (*)     POCT Sodium, Venous 132 (*)     POCT Potassium, Venous 4.2      POCT Chloride, Venous 105      POCT Ionized Calicum, Venous 1.15      POCT Glucose, Venous 323 (*)     POCT Lactate, Venous 3.9 (*)     POCT Base Excess, Venous -8.4 (*)     POCT HCO3 Calculated, Venous 16.2 (*)     POCT Hemoglobin, Venous 12.1 (*)     POCT Anion Gap, Venous 15.0      Patient Temperature 37.0      FiO2 21     BETA HYDROXYBUTYRATE - Abnormal    Beta-Hydroxybutyrate 2.47 (*)     Narrative:     The Beta-Hydroxybutyrate test performance characteristics have been validated by [facility]. This test has not been approved by the FDA; however, such approval is not necessary.   URINALYSIS WITH REFLEX CULTURE AND MICROSCOPIC - Abnormal    Color, Urine Colorless (*)     Appearance, Urine Clear      Specific Gravity, Urine 1.023      pH, Urine 5.5      Protein, Urine NEGATIVE      Glucose, Urine OVER (4+) (*)     Blood, Urine NEGATIVE      Ketones, Urine 40 (2+) (*)     Bilirubin, Urine NEGATIVE      Urobilinogen, Urine Normal      Nitrite, Urine NEGATIVE      Leukocyte Esterase, Urine NEGATIVE      Narrative:     OVER is reported when the result is greater than the clinically reportable range.   BLOOD GAS LACTIC ACID, VENOUS - Abnormal    POCT Lactate, Venous 4.1 (*)    B-TYPE NATRIURETIC PEPTIDE - Abnormal     (*)     Narrative:        <100 pg/mL - Heart failure unlikely  100-299 pg/mL - Intermediate probability of acute heart                  failure exacerbation. Correlate with clinical                  context and patient history.    >=300 pg/mL - Heart Failure likely. Correlate with clinical                   context and patient history.    BNP testing is performed using different testing methodology at [facility] than at [facility]. Direct result comparisons should only be made within the same method.      BLOOD GAS LACTIC ACID, VENOUS - Abnormal    POCT Lactate, Venous 4.3 (*)    POCT GLUCOSE - Abnormal    POCT Glucose 319 (*)    POCT GLUCOSE - Abnormal    POCT Glucose 297 (*)    SARS-COV-2 AND INFLUENZA A/B PCR - Normal    Flu A Result Not Detected      Flu B Result Not Detected      Coronavirus 2019, PCR Not Detected      Narrative:     This assay is an FDA-cleared, in vitro diagnostic nucleic acid amplification test for the qualitative detection and differentiation of SARS CoV-2/ Influenza A/B from nasopharyngeal specimens collected from individuals with signs and symptoms of respiratory tract infections, and has been validated for use at [facility]. Negative results do not preclude COVID-19/ Influenza A/B infections and should not be used as the sole basis for diagnosis, treatment, or other management decisions. Testing for SARS CoV-2 is recommended only for patients who meet current clinical and/or epidemiological criteria defined by federal, state, or local public health directives.   RSV PCR - Normal    RSV PCR Not Detected      Narrative:     This assay is an FDA-cleared, in vitro diagnostic nucleic acid amplification test for the detection of RSV from nasopharyngeal specimens, and has been validated for use at [facility]. Negative results do not preclude RSV infections, and should not be used as the sole basis for diagnosis, treatment, or other management decisions. If Influenza A/B and RSV PCR results are negative, testing for Parainfluenza virus, Adenovirus and Metapneumovirus is routinely performed for pediatric oncology and intensive care inpatients at [facility], and is available on other patients by placing an add-on request.       TSH  WITH REFLEX TO FREE T4 IF ABNORMAL - Normal    Thyroid Stimulating Hormone 2.20      Narrative:     TSH testing is performed using different testing methodology at [facility] than at other [facility]. Direct result comparisons should only be made within the same method.     SERIAL TROPONIN-INITIAL - Normal    Troponin I, High Sensitivity 18      Narrative:     Less than 99th percentile of normal range cutoff-  Female and children under 18 years old <14 ng/L; Male <21 ng/L: Negative  Repeat testing should be performed if clinically indicated.     Female and children under 18 years old 14-50 ng/L; Male 21-50 ng/L:  Consistent with possible cardiac damage and possible increased clinical   risk. Serial measurements may help to assess extent of myocardial damage.     >50 ng/L: Consistent with cardiac damage, increased clinical risk and  myocardial infarction. Serial measurements may help assess extent of   myocardial damage.      NOTE: Children less than 1 year old may have higher baseline troponin   levels and results should be interpreted in conjunction with the overall   clinical context.     NOTE: Troponin I testing is performed using a different   testing methodology at [facility] than at other   [facility]. Direct result comparisons should only   be made within the same method.   SERIAL TROPONIN, 1 HOUR - Normal    Troponin I, High Sensitivity 16      Narrative:     Less than 99th percentile of normal range cutoff-  Female and children under 18 years old <14 ng/L; Male <21 ng/L: Negative  Repeat testing should be performed if clinically indicated.     Female and children under 18 years old 14-50 ng/L; Male 21-50 ng/L:  Consistent with possible cardiac damage and possible increased clinical   risk. Serial measurements may help to assess extent of myocardial damage.     >50 ng/L: Consistent with cardiac damage, increased clinical risk and  myocardial infarction. Serial  measurements may help assess extent of   myocardial damage.      NOTE: Children less than 1 year old may have higher baseline troponin   levels and results should be interpreted in conjunction with the overall   clinical context.     NOTE: Troponin I testing is performed using a different   testing methodology at [facility] than at other   [facility]. Direct result comparisons should only   be made within the same method.   TROPONIN SERIES- (INITIAL, 1 HR)    Narrative:     The following orders were created for panel order Troponin Series, (0, 1 HR).  Procedure                               Abnormality         Status                     ---------                               -----------         ------                     Troponin I, High Sensiti...[[id number]]  Normal              Final result               Troponin, High Sensitivi...[[id number]]  Normal              Final result                 Please view results for these tests on the individual orders.   URINALYSIS WITH REFLEX CULTURE AND MICROSCOPIC    Narrative:     The following orders were created for panel order Urinalysis with Reflex Culture and Microscopic.  Procedure                               Abnormality         Status                     ---------                               -----------         ------                     Urinalysis with Reflex C...[[id number]]  Abnormal            Final result               Extra Urine Gray Tube[[id number]]                                                         Please view results for these tests on the individual orders.   EXTRA URINE GRAY TUBE   LACTATE   BLOOD GAS LACTIC ACID, VENOUS   POCT GLUCOSE METER      XR chest 1 view   Final Result   No acute disease.   Signed by Ravindra Hood MD      CT abdomen pelvis w IV contrast   Final Result   1.  Pancolitis.   2.  Diffuse plaque throughout the abdominal aorta.   3.  Prominent distention of the urinary bladder.   4.  Coronary artery  calcifications.   Signed by Trevor Estrada MD           Considerations/further MDM:  Patient is a 64-year-old male presenting for evaluation of hyperglycemia    I saw this patient in conjunction with Dr. Boudreaux.    Patient is awake alert does appear dehydrated on exam.  Abdomen is nontender to palpation.  EKG is performed without evidence of arrhythmia or ischemia. Laboratory workup is with elevated beta hydroxybutyrate, decreased bicarb and anion gap but patient is not acidotic on VBG to suggest DKA.  Hyperglycemia did improve with fluid administration. Lactate is elevated which is felt to be related to dehydration secondary to pancolitis as suggested on CT and poor oral intake.  Patient is provided 3 L of fluid and still remains tachycardic and also received Reglan, Zofran and remains persistently nauseous and unable to tolerate p.o. during the visit.  Patient is afebrile and without significant leukocytosis thus feel colitis is less likely bacterial more likely viral in nature.  Patient is admitted to the hospitalist service for further management of his dehydration and intractable nausea and vomiting.  Patient is counseled on all findings and agreeable with this plan of care.          Procedure  Procedures     Yoon Donato PA-C  01/21/25 3418

## 2025-01-21 NOTE — H&P
History Of Present Illness  Devin Gomez is a 64 y.o. male presenting with generalized discomfort and hyperglycemia.  He states yesterday morning he did not feel good and was nauseous and had dry heaves.  He also stated that he was having intermittent abdominal cramping.  He states that he feels hot and cold at the same time.  Last night he states that his blood sugars in the 500s and he took extra insulin and his sugar was still elevated today.  EMS checked her sugar and it was in the 500s and when he got to the emergency room.  When labs were drawn in the emergency room his glucose was 348 and it was checked 4 hours later and down to 297.  He has a history of type 2 diabetes on insulin and NPH at home.  Currently he states he feels both hot and cold.  Has intermittent nausea with dry heaves.  He denies chest pain or shortness of breath.  He states that he has intermittent stomach cramps.     Past Medical History  Past Medical History:   Diagnosis Date    Diabetes mellitus (Multi)        Surgical History  History reviewed. No pertinent surgical history.     Social History  He reports that he has never smoked. He has never used smokeless tobacco. He reports that he does not currently use alcohol. He reports that he does not currently use drugs.    Family History  No family history on file.     Allergies  Patient has no known allergies.    Review of Systems  All systems reviewed and negative except as noted in HPI  Physical Exam  Constitutional: No acute distress, calm, cooperative  HEENT: PERRL, normocephalic, atraumatic, mucous membranes moist  Cardiovascular: Regular rhythm and rate,   Respiratory: Lungs clear to auscultation,   Gastrointestinal: Bowel sounds positive x 4, soft, nontender  Neurologic: Alert and oriented x 3 equal strength bilaterally  Musculoskeletal: Able to move all extremities, no edema  Skin: Warm, dry and intact  Last Recorded Vitals  Blood pressure (!) 167/101, pulse 99, temperature 36.2  "°C (97.2 °F), temperature source Temporal, resp. rate 16, height 1.753 m (5' 9\"), weight 65.8 kg (145 lb), SpO2 99%.               Assessment/Plan   Assessment & Plan  Dehydration  Pancolitis  Complaining of stomach cramping  Abdomen pelvis showed pancolitis  Elevated lactate  IV fluids half-normal saline at 150 an hour  Clear liquid diet  Stool panel  Zosyn  Consult gastroenterology  Type 2 diabetes mellitus with hyperglycemia  Blood glucose 283 and will give 5 units IV insulin  Check ABG  Check blood glucose every 2 hours  Check BMP every 4 hours  High blood pressure without diagnosis of hypertension  Hydralazine for systolic blood pressure greater than 160  Diffuse abdominal aorta plaque  Consider vascular consult outpatient  Urinary bladder distention  Bladder scan for over 1100  Patient was straight cath  Will do bladder scans every 6 hours and straight cath for residual greater than 350 mL  DVT prophylaxis  Lovenox         I spent 75 minutes in the professional and overall care of this patient.      Chau Lovett, APRN-CNP    "

## 2025-01-21 NOTE — PROGRESS NOTES
"Pharmacy Medication History Review    eDvin Gomez is a 64 y.o. male admitted for hyperglycemia. Pharmacy reviewed the patient's [id number]-ugvzxvihn medications and allergies for accuracy.    Medications ADDED:  N/A  Medications CHANGED:  N/A  Medications REMOVED:   Kenalog     The list below reflects the updated PTA list.   Prior to Admission Medications   Prescriptions Last Dose Informant Patient Reported? Taking?   blood sugar diagnostic (True Metrix Glucose Test Strip) strip   No No   Sig: TEST 4 TIMES DAILY   gabapentin (Neurontin) 100 mg capsule   No No   Sig: Take 1 capsule (100 mg) by mouth 3 times a day for 10 days.   insulin NPH, Isophane, (NovoLIN N NPH U-100 Insulin) 100 unit/mL injection 2025 Morning  No Yes   Si UNITS SUBCUTANEOUS DAILY 90 DAY(S)   insulin aspart (NovoLOG U-100 Insulin aspart) 100 unit/mL injection 2025 Evening  No Yes   Sig: AS DIRECTED SUBCUTANEOUS SLIDING SCALE AS DIRECTED( MAX DOSE 45 U) 90 DAY(S)   insulin syringe-needle U-100 (BD Insulin Syringe Ultra-Fine) 31G X 5/16\" 0.5 mL syringe   No No   Sig: USE THREE TIMES DAILY 90 DAY(S)   triamcinolone (Kenalog) 0.1 % cream   No No   Sig: APPLY TOPICALLY 2 TIMES A DAY. APPLY TO AFFECTED AREA 1-2 TIMES DAILY AS NEEDED.      Facility-Administered Medications: None        The list below reflects the updated allergy list. Please review each documented allergy for additional clarification and justification.  Allergies  Reviewed by Alyssia Vogel on 2025   No Known Allergies         Patient accepts M2B at discharge.     Sources:   Pharmacy dispense history  Patient interview Moderate historian     Additional Comments:  Patient stated he usually goes a week without sleeping due to loud neighbors. Stated he took a table spoon of ZZZQuil to help him sleep last night.      Alyssia Vogel  Pharmacy Technician  25     Secure Chat preferred     "

## 2025-01-22 ENCOUNTER — APPOINTMENT (OUTPATIENT)
Dept: RADIOLOGY | Facility: HOSPITAL | Age: 65
End: 2025-01-22
Payer: COMMERCIAL

## 2025-01-22 ENCOUNTER — APPOINTMENT (OUTPATIENT)
Dept: CARDIOLOGY | Facility: HOSPITAL | Age: 65
End: 2025-01-22
Payer: COMMERCIAL

## 2025-01-22 PROBLEM — E11.10 DIABETIC KETOACIDOSIS WITHOUT COMA ASSOCIATED WITH TYPE 2 DIABETES MELLITUS (MULTI): Status: ACTIVE | Noted: 2025-01-22

## 2025-01-22 PROBLEM — I21.4 NSTEMI (NON-ST ELEVATED MYOCARDIAL INFARCTION) (MULTI): Status: ACTIVE | Noted: 2025-01-22

## 2025-01-22 PROBLEM — R11.10 VOMITING: Status: ACTIVE | Noted: 2025-01-22

## 2025-01-22 PROBLEM — K51.00 PANCOLITIS (MULTI): Status: ACTIVE | Noted: 2025-01-22

## 2025-01-22 PROBLEM — R33.9 BLADDER RETENTION: Status: ACTIVE | Noted: 2025-01-22

## 2025-01-22 LAB
ANION GAP SERPL CALCULATED.3IONS-SCNC: 14 MMOL/L (ref 10–20)
ANION GAP SERPL CALCULATED.3IONS-SCNC: 15 MMOL/L (ref 10–20)
ANION GAP SERPL CALCULATED.3IONS-SCNC: 17 MMOL/L (ref 10–20)
ANION GAP SERPL CALCULATED.3IONS-SCNC: 20 MMOL/L (ref 10–20)
ANION GAP SERPL CALCULATED.3IONS-SCNC: 21 MMOL/L (ref 10–20)
ANION GAP SERPL CALCULATED.3IONS-SCNC: 23 MMOL/L (ref 10–20)
AORTIC VALVE MEAN GRADIENT: 16 MMHG
AORTIC VALVE PEAK VELOCITY: 2.49 M/S
APTT PPP: 24.9 SECONDS (ref 22–32.5)
ATRIAL RATE: 92 BPM
AV PEAK GRADIENT: 25 MMHG
AVA (PEAK VEL): 1.39 CM2
AVA (VTI): 1.3 CM2
BNP SERPL-MCNC: 661 PG/ML (ref 0–99)
BUN SERPL-MCNC: 25 MG/DL (ref 6–23)
BUN SERPL-MCNC: 26 MG/DL (ref 6–23)
BUN SERPL-MCNC: 27 MG/DL (ref 6–23)
BUN SERPL-MCNC: 28 MG/DL (ref 6–23)
BUN SERPL-MCNC: 29 MG/DL (ref 6–23)
BUN SERPL-MCNC: 29 MG/DL (ref 6–23)
CALCIUM SERPL-MCNC: 8.5 MG/DL (ref 8.6–10.3)
CALCIUM SERPL-MCNC: 8.7 MG/DL (ref 8.6–10.3)
CALCIUM SERPL-MCNC: 8.8 MG/DL (ref 8.6–10.3)
CALCIUM SERPL-MCNC: 8.8 MG/DL (ref 8.6–10.3)
CARDIAC TROPONIN I PNL SERPL HS: 122 NG/L (ref 0–20)
CARDIAC TROPONIN I PNL SERPL HS: 332 NG/L (ref 0–20)
CARDIAC TROPONIN I PNL SERPL HS: 558 NG/L (ref 0–20)
CARDIAC TROPONIN I PNL SERPL HS: 594 NG/L (ref 0–20)
CHLORIDE SERPL-SCNC: 101 MMOL/L (ref 98–107)
CHLORIDE SERPL-SCNC: 101 MMOL/L (ref 98–107)
CHLORIDE SERPL-SCNC: 102 MMOL/L (ref 98–107)
CHLORIDE SERPL-SCNC: 102 MMOL/L (ref 98–107)
CHLORIDE SERPL-SCNC: 97 MMOL/L (ref 98–107)
CHLORIDE SERPL-SCNC: 98 MMOL/L (ref 98–107)
CO2 SERPL-SCNC: 14 MMOL/L (ref 21–32)
CO2 SERPL-SCNC: 15 MMOL/L (ref 21–32)
CO2 SERPL-SCNC: 17 MMOL/L (ref 21–32)
CO2 SERPL-SCNC: 18 MMOL/L (ref 21–32)
CO2 SERPL-SCNC: 21 MMOL/L (ref 21–32)
CO2 SERPL-SCNC: 21 MMOL/L (ref 21–32)
CREAT SERPL-MCNC: 1.17 MG/DL (ref 0.5–1.3)
CREAT SERPL-MCNC: 1.22 MG/DL (ref 0.5–1.3)
CREAT SERPL-MCNC: 1.22 MG/DL (ref 0.5–1.3)
CREAT SERPL-MCNC: 1.23 MG/DL (ref 0.5–1.3)
CREAT SERPL-MCNC: 1.24 MG/DL (ref 0.5–1.3)
CREAT SERPL-MCNC: 1.28 MG/DL (ref 0.5–1.3)
CRP SERPL-MCNC: 0.58 MG/DL
EGFRCR SERPLBLD CKD-EPI 2021: 62 ML/MIN/1.73M*2
EGFRCR SERPLBLD CKD-EPI 2021: 64 ML/MIN/1.73M*2
EGFRCR SERPLBLD CKD-EPI 2021: 65 ML/MIN/1.73M*2
EGFRCR SERPLBLD CKD-EPI 2021: 69 ML/MIN/1.73M*2
EJECTION FRACTION APICAL 4 CHAMBER: 66.6
EJECTION FRACTION: 63 %
ERYTHROCYTE [DISTWIDTH] IN BLOOD BY AUTOMATED COUNT: 12.8 % (ref 11.5–14.5)
ERYTHROCYTE [DISTWIDTH] IN BLOOD BY AUTOMATED COUNT: 13 % (ref 11.5–14.5)
EST. AVERAGE GLUCOSE BLD GHB EST-MCNC: 246 MG/DL
GLUCOSE BLD MANUAL STRIP-MCNC: 113 MG/DL (ref 74–99)
GLUCOSE BLD MANUAL STRIP-MCNC: 116 MG/DL (ref 74–99)
GLUCOSE BLD MANUAL STRIP-MCNC: 124 MG/DL (ref 74–99)
GLUCOSE BLD MANUAL STRIP-MCNC: 143 MG/DL (ref 74–99)
GLUCOSE BLD MANUAL STRIP-MCNC: 148 MG/DL (ref 74–99)
GLUCOSE BLD MANUAL STRIP-MCNC: 151 MG/DL (ref 74–99)
GLUCOSE BLD MANUAL STRIP-MCNC: 152 MG/DL (ref 74–99)
GLUCOSE BLD MANUAL STRIP-MCNC: 168 MG/DL (ref 74–99)
GLUCOSE BLD MANUAL STRIP-MCNC: 176 MG/DL (ref 74–99)
GLUCOSE BLD MANUAL STRIP-MCNC: 238 MG/DL (ref 74–99)
GLUCOSE BLD MANUAL STRIP-MCNC: 255 MG/DL (ref 74–99)
GLUCOSE BLD MANUAL STRIP-MCNC: 313 MG/DL (ref 74–99)
GLUCOSE BLD MANUAL STRIP-MCNC: 335 MG/DL (ref 74–99)
GLUCOSE SERPL-MCNC: 145 MG/DL (ref 74–99)
GLUCOSE SERPL-MCNC: 179 MG/DL (ref 74–99)
GLUCOSE SERPL-MCNC: 236 MG/DL (ref 74–99)
GLUCOSE SERPL-MCNC: 255 MG/DL (ref 74–99)
GLUCOSE SERPL-MCNC: 298 MG/DL (ref 74–99)
GLUCOSE SERPL-MCNC: 352 MG/DL (ref 74–99)
HBA1C MFR BLD: 10.2 %
HCT VFR BLD AUTO: 33.6 % (ref 41–52)
HCT VFR BLD AUTO: 33.9 % (ref 41–52)
HGB BLD-MCNC: 11.5 G/DL (ref 13.5–17.5)
HGB BLD-MCNC: 11.7 G/DL (ref 13.5–17.5)
LACTATE SERPL-SCNC: 2 MMOL/L (ref 0.4–2)
LACTATE SERPL-SCNC: 2 MMOL/L (ref 0.4–2)
LEFT VENTRICLE INTERNAL DIMENSION DIASTOLE: 4.07 CM (ref 3.5–6)
LEFT VENTRICULAR OUTFLOW TRACT DIAMETER: 2.1 CM
LV EJECTION FRACTION BIPLANE: 57 %
MAGNESIUM SERPL-MCNC: 1.6 MG/DL (ref 1.6–2.4)
MCH RBC QN AUTO: 28.7 PG (ref 26–34)
MCH RBC QN AUTO: 29 PG (ref 26–34)
MCHC RBC AUTO-ENTMCNC: 34.2 G/DL (ref 32–36)
MCHC RBC AUTO-ENTMCNC: 34.5 G/DL (ref 32–36)
MCV RBC AUTO: 83 FL (ref 80–100)
MCV RBC AUTO: 85 FL (ref 80–100)
MITRAL VALVE E/A RATIO: 0.65
NRBC BLD-RTO: 0 /100 WBCS (ref 0–0)
NRBC BLD-RTO: 0 /100 WBCS (ref 0–0)
P AXIS: 84 DEGREES
P OFFSET: 200 MS
P ONSET: 151 MS
PHOSPHATE SERPL-MCNC: 2.3 MG/DL (ref 2.5–4.9)
PLATELET # BLD AUTO: 177 X10*3/UL (ref 150–450)
PLATELET # BLD AUTO: 190 X10*3/UL (ref 150–450)
POTASSIUM SERPL-SCNC: 3.5 MMOL/L (ref 3.5–5.3)
POTASSIUM SERPL-SCNC: 3.7 MMOL/L (ref 3.5–5.3)
POTASSIUM SERPL-SCNC: 3.9 MMOL/L (ref 3.5–5.3)
POTASSIUM SERPL-SCNC: 4 MMOL/L (ref 3.5–5.3)
POTASSIUM SERPL-SCNC: 4.3 MMOL/L (ref 3.5–5.3)
POTASSIUM SERPL-SCNC: 4.8 MMOL/L (ref 3.5–5.3)
PR INTERVAL: 142 MS
Q ONSET: 222 MS
QRS COUNT: 15 BEATS
QRS DURATION: 86 MS
QT INTERVAL: 386 MS
QTC CALCULATION(BAZETT): 477 MS
QTC FREDERICIA: 445 MS
R AXIS: 81 DEGREES
RBC # BLD AUTO: 3.97 X10*6/UL (ref 4.5–5.9)
RBC # BLD AUTO: 4.07 X10*6/UL (ref 4.5–5.9)
RIGHT VENTRICLE FREE WALL PEAK S': 15.7 CM/S
SODIUM SERPL-SCNC: 130 MMOL/L (ref 136–145)
SODIUM SERPL-SCNC: 131 MMOL/L (ref 136–145)
SODIUM SERPL-SCNC: 132 MMOL/L (ref 136–145)
SODIUM SERPL-SCNC: 132 MMOL/L (ref 136–145)
SODIUM SERPL-SCNC: 133 MMOL/L (ref 136–145)
SODIUM SERPL-SCNC: 134 MMOL/L (ref 136–145)
T AXIS: 67 DEGREES
T OFFSET: 415 MS
TRICUSPID ANNULAR PLANE SYSTOLIC EXCURSION: 1.9 CM
VENTRICULAR RATE: 92 BPM
WBC # BLD AUTO: 10.3 X10*3/UL (ref 4.4–11.3)
WBC # BLD AUTO: 10.7 X10*3/UL (ref 4.4–11.3)

## 2025-01-22 PROCEDURE — 84484 ASSAY OF TROPONIN QUANT: CPT | Performed by: INTERNAL MEDICINE

## 2025-01-22 PROCEDURE — 84100 ASSAY OF PHOSPHORUS: CPT | Performed by: INTERNAL MEDICINE

## 2025-01-22 PROCEDURE — 2500000002 HC RX 250 W HCPCS SELF ADMINISTERED DRUGS (ALT 637 FOR MEDICARE OP, ALT 636 FOR OP/ED): Performed by: REGISTERED NURSE

## 2025-01-22 PROCEDURE — 2500000002 HC RX 250 W HCPCS SELF ADMINISTERED DRUGS (ALT 637 FOR MEDICARE OP, ALT 636 FOR OP/ED): Performed by: STUDENT IN AN ORGANIZED HEALTH CARE EDUCATION/TRAINING PROGRAM

## 2025-01-22 PROCEDURE — 51701 INSERT BLADDER CATHETER: CPT

## 2025-01-22 PROCEDURE — 83605 ASSAY OF LACTIC ACID: CPT | Performed by: NURSE PRACTITIONER

## 2025-01-22 PROCEDURE — 86140 C-REACTIVE PROTEIN: CPT

## 2025-01-22 PROCEDURE — 99255 IP/OBS CONSLTJ NEW/EST HI 80: CPT | Performed by: INTERNAL MEDICINE

## 2025-01-22 PROCEDURE — 2020000001 HC ICU ROOM DAILY

## 2025-01-22 PROCEDURE — 97165 OT EVAL LOW COMPLEX 30 MIN: CPT | Mod: GO

## 2025-01-22 PROCEDURE — 85730 THROMBOPLASTIN TIME PARTIAL: CPT | Performed by: STUDENT IN AN ORGANIZED HEALTH CARE EDUCATION/TRAINING PROGRAM

## 2025-01-22 PROCEDURE — 2500000004 HC RX 250 GENERAL PHARMACY W/ HCPCS (ALT 636 FOR OP/ED)

## 2025-01-22 PROCEDURE — 99233 SBSQ HOSP IP/OBS HIGH 50: CPT | Performed by: INTERNAL MEDICINE

## 2025-01-22 PROCEDURE — 93005 ELECTROCARDIOGRAM TRACING: CPT

## 2025-01-22 PROCEDURE — C8929 TTE W OR WO FOL WCON,DOPPLER: HCPCS

## 2025-01-22 PROCEDURE — 2500000004 HC RX 250 GENERAL PHARMACY W/ HCPCS (ALT 636 FOR OP/ED): Performed by: NURSE PRACTITIONER

## 2025-01-22 PROCEDURE — 93010 ELECTROCARDIOGRAM REPORT: CPT | Performed by: INTERNAL MEDICINE

## 2025-01-22 PROCEDURE — 97161 PT EVAL LOW COMPLEX 20 MIN: CPT | Mod: GP

## 2025-01-22 PROCEDURE — 83735 ASSAY OF MAGNESIUM: CPT | Performed by: INTERNAL MEDICINE

## 2025-01-22 PROCEDURE — 93306 TTE W/DOPPLER COMPLETE: CPT | Performed by: INTERNAL MEDICINE

## 2025-01-22 PROCEDURE — 80048 BASIC METABOLIC PNL TOTAL CA: CPT | Performed by: NURSE PRACTITIONER

## 2025-01-22 PROCEDURE — 85027 COMPLETE CBC AUTOMATED: CPT | Performed by: NURSE PRACTITIONER

## 2025-01-22 PROCEDURE — 80048 BASIC METABOLIC PNL TOTAL CA: CPT | Performed by: INTERNAL MEDICINE

## 2025-01-22 PROCEDURE — 71045 X-RAY EXAM CHEST 1 VIEW: CPT

## 2025-01-22 PROCEDURE — 85027 COMPLETE CBC AUTOMATED: CPT | Performed by: STUDENT IN AN ORGANIZED HEALTH CARE EDUCATION/TRAINING PROGRAM

## 2025-01-22 PROCEDURE — 84484 ASSAY OF TROPONIN QUANT: CPT | Performed by: STUDENT IN AN ORGANIZED HEALTH CARE EDUCATION/TRAINING PROGRAM

## 2025-01-22 PROCEDURE — 36415 COLL VENOUS BLD VENIPUNCTURE: CPT | Performed by: NURSE PRACTITIONER

## 2025-01-22 PROCEDURE — 2500000004 HC RX 250 GENERAL PHARMACY W/ HCPCS (ALT 636 FOR OP/ED): Performed by: INTERNAL MEDICINE

## 2025-01-22 PROCEDURE — 83605 ASSAY OF LACTIC ACID: CPT | Performed by: INTERNAL MEDICINE

## 2025-01-22 PROCEDURE — 2500000001 HC RX 250 WO HCPCS SELF ADMINISTERED DRUGS (ALT 637 FOR MEDICARE OP): Performed by: STUDENT IN AN ORGANIZED HEALTH CARE EDUCATION/TRAINING PROGRAM

## 2025-01-22 PROCEDURE — 83880 ASSAY OF NATRIURETIC PEPTIDE: CPT | Performed by: INTERNAL MEDICINE

## 2025-01-22 PROCEDURE — 84484 ASSAY OF TROPONIN QUANT: CPT | Performed by: REGISTERED NURSE

## 2025-01-22 PROCEDURE — 2500000004 HC RX 250 GENERAL PHARMACY W/ HCPCS (ALT 636 FOR OP/ED): Performed by: STUDENT IN AN ORGANIZED HEALTH CARE EDUCATION/TRAINING PROGRAM

## 2025-01-22 PROCEDURE — 71045 X-RAY EXAM CHEST 1 VIEW: CPT | Performed by: RADIOLOGY

## 2025-01-22 PROCEDURE — 82947 ASSAY GLUCOSE BLOOD QUANT: CPT

## 2025-01-22 RX ORDER — HEPARIN SODIUM 10000 [USP'U]/100ML
0-4000 INJECTION, SOLUTION INTRAVENOUS CONTINUOUS
Status: DISCONTINUED | OUTPATIENT
Start: 2025-01-22 | End: 2025-01-22

## 2025-01-22 RX ORDER — DEXTROSE MONOHYDRATE 100 MG/ML
150 INJECTION, SOLUTION INTRAVENOUS CONTINUOUS PRN
Status: ACTIVE | OUTPATIENT
Start: 2025-01-22 | End: 2025-01-23

## 2025-01-22 RX ORDER — PANTOPRAZOLE SODIUM 40 MG/10ML
40 INJECTION, POWDER, LYOPHILIZED, FOR SOLUTION INTRAVENOUS 2 TIMES DAILY
Status: DISCONTINUED | OUTPATIENT
Start: 2025-01-22 | End: 2025-02-05 | Stop reason: HOSPADM

## 2025-01-22 RX ORDER — INSULIN LISPRO 100 [IU]/ML
10 INJECTION, SOLUTION INTRAVENOUS; SUBCUTANEOUS ONCE
Status: COMPLETED | OUTPATIENT
Start: 2025-01-22 | End: 2025-01-22

## 2025-01-22 RX ORDER — DEXTROSE MONOHYDRATE AND SODIUM CHLORIDE 5; .45 G/100ML; G/100ML
150 INJECTION, SOLUTION INTRAVENOUS CONTINUOUS PRN
Status: DISCONTINUED | OUTPATIENT
Start: 2025-01-22 | End: 2025-01-23

## 2025-01-22 RX ORDER — HEPARIN SODIUM 5000 [USP'U]/ML
INJECTION, SOLUTION INTRAVENOUS; SUBCUTANEOUS AS NEEDED
Status: DISCONTINUED | OUTPATIENT
Start: 2025-01-22 | End: 2025-02-02 | Stop reason: SDUPTHER

## 2025-01-22 RX ORDER — MAGNESIUM SULFATE HEPTAHYDRATE 40 MG/ML
2 INJECTION, SOLUTION INTRAVENOUS ONCE
Status: COMPLETED | OUTPATIENT
Start: 2025-01-22 | End: 2025-01-22

## 2025-01-22 RX ORDER — HEPARIN SODIUM 5000 [USP'U]/ML
5000 INJECTION, SOLUTION INTRAVENOUS; SUBCUTANEOUS EVERY 8 HOURS
Status: DISCONTINUED | OUTPATIENT
Start: 2025-01-22 | End: 2025-02-05 | Stop reason: HOSPADM

## 2025-01-22 RX ORDER — NAPROXEN SODIUM 220 MG/1
81 TABLET, FILM COATED ORAL DAILY
Status: DISCONTINUED | OUTPATIENT
Start: 2025-01-23 | End: 2025-02-05 | Stop reason: HOSPADM

## 2025-01-22 RX ORDER — DEXTROSE 50 % IN WATER (D50W) INTRAVENOUS SYRINGE
50
Status: DISCONTINUED | OUTPATIENT
Start: 2025-01-22 | End: 2025-02-03

## 2025-01-22 RX ORDER — HEPARIN SODIUM 5000 [USP'U]/ML
60 INJECTION, SOLUTION INTRAVENOUS; SUBCUTANEOUS ONCE
Status: COMPLETED | OUTPATIENT
Start: 2025-01-22 | End: 2025-01-22

## 2025-01-22 RX ORDER — POTASSIUM CHLORIDE 14.9 MG/ML
20 INJECTION INTRAVENOUS
Status: COMPLETED | OUTPATIENT
Start: 2025-01-23 | End: 2025-01-23

## 2025-01-22 RX ORDER — SODIUM CHLORIDE, SODIUM LACTATE, POTASSIUM CHLORIDE, CALCIUM CHLORIDE 600; 310; 30; 20 MG/100ML; MG/100ML; MG/100ML; MG/100ML
75 INJECTION, SOLUTION INTRAVENOUS CONTINUOUS
Status: ACTIVE | OUTPATIENT
Start: 2025-01-22 | End: 2025-01-23

## 2025-01-22 RX ORDER — NAPROXEN SODIUM 220 MG/1
325 TABLET, FILM COATED ORAL ONCE
Status: COMPLETED | OUTPATIENT
Start: 2025-01-22 | End: 2025-01-22

## 2025-01-22 RX ADMIN — PIPERACILLIN SODIUM AND TAZOBACTAM SODIUM 3.38 G: 3; .375 INJECTION, SOLUTION INTRAVENOUS at 02:32

## 2025-01-22 RX ADMIN — PERFLUTREN 4 ML OF DILUTION: 6.52 INJECTION, SUSPENSION INTRAVENOUS at 09:50

## 2025-01-22 RX ADMIN — SODIUM CHLORIDE, SODIUM LACTATE, POTASSIUM CHLORIDE, AND CALCIUM CHLORIDE 1000 ML: 600; 310; 30; 20 INJECTION, SOLUTION INTRAVENOUS at 12:26

## 2025-01-22 RX ADMIN — MAGNESIUM SULFATE HEPTAHYDRATE 2 G: 40 INJECTION, SOLUTION INTRAVENOUS at 15:41

## 2025-01-22 RX ADMIN — DEXTROSE MONOHYDRATE 150 ML/HR: 10 INJECTION, SOLUTION INTRAVENOUS at 18:33

## 2025-01-22 RX ADMIN — HEPARIN SODIUM 5000 UNITS: 5000 INJECTION, SOLUTION INTRAVENOUS; SUBCUTANEOUS at 12:27

## 2025-01-22 RX ADMIN — PIPERACILLIN SODIUM AND TAZOBACTAM SODIUM 3.38 G: 3; .375 INJECTION, SOLUTION INTRAVENOUS at 08:15

## 2025-01-22 RX ADMIN — PROMETHAZINE HYDROCHLORIDE 12.5 MG: 25 INJECTION INTRAMUSCULAR; INTRAVENOUS at 15:18

## 2025-01-22 RX ADMIN — INSULIN HUMAN 5 UNITS/HR: 1 INJECTION, SOLUTION INTRAVENOUS at 12:26

## 2025-01-22 RX ADMIN — PANTOPRAZOLE SODIUM 40 MG: 40 INJECTION, POWDER, FOR SOLUTION INTRAVENOUS at 12:28

## 2025-01-22 RX ADMIN — PROMETHAZINE HYDROCHLORIDE 12.5 MG: 25 INJECTION INTRAMUSCULAR; INTRAVENOUS at 22:31

## 2025-01-22 RX ADMIN — SODIUM CHLORIDE 150 ML/HR: 450 INJECTION, SOLUTION INTRAVENOUS at 05:16

## 2025-01-22 RX ADMIN — ASPIRIN 81 MG CHEWABLE TABLET 324 MG: 81 TABLET CHEWABLE at 06:45

## 2025-01-22 RX ADMIN — POTASSIUM CHLORIDE 20 MEQ: 14.9 INJECTION, SOLUTION INTRAVENOUS at 23:50

## 2025-01-22 RX ADMIN — HEPARIN SODIUM 700 UNITS/HR: 10000 INJECTION, SOLUTION INTRAVENOUS at 06:44

## 2025-01-22 RX ADMIN — INSULIN LISPRO 8 UNITS: 100 INJECTION, SOLUTION INTRAVENOUS; SUBCUTANEOUS at 08:14

## 2025-01-22 RX ADMIN — ONDANSETRON 4 MG: 4 TABLET, ORALLY DISINTEGRATING ORAL at 08:39

## 2025-01-22 RX ADMIN — ONDANSETRON 4 MG: 2 INJECTION INTRAMUSCULAR; INTRAVENOUS at 20:25

## 2025-01-22 RX ADMIN — HEPARIN SODIUM 3600 UNITS: 5000 INJECTION, SOLUTION INTRAVENOUS; SUBCUTANEOUS at 06:45

## 2025-01-22 RX ADMIN — SODIUM CHLORIDE, SODIUM LACTATE, POTASSIUM CHLORIDE, AND CALCIUM CHLORIDE 150 ML/HR: 600; 310; 30; 20 INJECTION, SOLUTION INTRAVENOUS at 12:33

## 2025-01-22 RX ADMIN — PANTOPRAZOLE SODIUM 40 MG: 40 INJECTION, POWDER, FOR SOLUTION INTRAVENOUS at 20:10

## 2025-01-22 RX ADMIN — PIPERACILLIN SODIUM AND TAZOBACTAM SODIUM 3.38 G: 3; .375 INJECTION, SOLUTION INTRAVENOUS at 20:10

## 2025-01-22 RX ADMIN — HEPARIN SODIUM 5000 UNITS: 5000 INJECTION, SOLUTION INTRAVENOUS; SUBCUTANEOUS at 20:10

## 2025-01-22 RX ADMIN — DEXTROSE MONOHYDRATE AND SODIUM CHLORIDE 150 ML/HR: 5; .45 INJECTION, SOLUTION INTRAVENOUS at 13:35

## 2025-01-22 RX ADMIN — INSULIN LISPRO 10 UNITS: 100 INJECTION, SOLUTION INTRAVENOUS; SUBCUTANEOUS at 01:27

## 2025-01-22 RX ADMIN — ONDANSETRON 4 MG: 2 INJECTION INTRAMUSCULAR; INTRAVENOUS at 12:42

## 2025-01-22 RX ADMIN — PIPERACILLIN SODIUM AND TAZOBACTAM SODIUM 3.38 G: 3; .375 INJECTION, SOLUTION INTRAVENOUS at 13:38

## 2025-01-22 SDOH — SOCIAL STABILITY: SOCIAL INSECURITY
WITHIN THE LAST YEAR, HAVE YOU BEEN RAPED OR FORCED TO HAVE ANY KIND OF SEXUAL ACTIVITY BY YOUR PARTNER OR EX-PARTNER?: NO

## 2025-01-22 SDOH — SOCIAL STABILITY: SOCIAL INSECURITY
WITHIN THE LAST YEAR, HAVE YOU BEEN KICKED, HIT, SLAPPED, OR OTHERWISE PHYSICALLY HURT BY YOUR PARTNER OR EX-PARTNER?: NO

## 2025-01-22 SDOH — ECONOMIC STABILITY: INCOME INSECURITY: IN THE PAST 12 MONTHS HAS THE ELECTRIC, GAS, OIL, OR WATER COMPANY THREATENED TO SHUT OFF SERVICES IN YOUR HOME?: NO

## 2025-01-22 SDOH — SOCIAL STABILITY: SOCIAL INSECURITY: WITHIN THE LAST YEAR, HAVE YOU BEEN AFRAID OF YOUR PARTNER OR EX-PARTNER?: NO

## 2025-01-22 SDOH — SOCIAL STABILITY: SOCIAL INSECURITY: WITHIN THE LAST YEAR, HAVE YOU BEEN HUMILIATED OR EMOTIONALLY ABUSED IN OTHER WAYS BY YOUR PARTNER OR EX-PARTNER?: NO

## 2025-01-22 SDOH — HEALTH STABILITY: PHYSICAL HEALTH: ON AVERAGE, HOW MANY MINUTES DO YOU ENGAGE IN EXERCISE AT THIS LEVEL?: 30 MIN

## 2025-01-22 SDOH — HEALTH STABILITY: PHYSICAL HEALTH: ON AVERAGE, HOW MANY DAYS PER WEEK DO YOU ENGAGE IN MODERATE TO STRENUOUS EXERCISE (LIKE A BRISK WALK)?: 2 DAYS

## 2025-01-22 ASSESSMENT — COGNITIVE AND FUNCTIONAL STATUS - GENERAL
STANDING UP FROM CHAIR USING ARMS: A LITTLE
TOILETING: A LOT
DAILY ACTIVITIY SCORE: 20
TURNING FROM BACK TO SIDE WHILE IN FLAT BAD: A LITTLE
CLIMB 3 TO 5 STEPS WITH RAILING: TOTAL
MOVING TO AND FROM BED TO CHAIR: A LITTLE
STANDING UP FROM CHAIR USING ARMS: A LITTLE
DRESSING REGULAR LOWER BODY CLOTHING: A LITTLE
DRESSING REGULAR UPPER BODY CLOTHING: A LITTLE
MOBILITY SCORE: 15
CLIMB 3 TO 5 STEPS WITH RAILING: A LITTLE
DRESSING REGULAR LOWER BODY CLOTHING: A LOT
WALKING IN HOSPITAL ROOM: A LOT
MOBILITY SCORE: 20
MOVING FROM LYING ON BACK TO SITTING ON SIDE OF FLAT BED WITH BEDRAILS: A LITTLE
TOILETING: A LITTLE
WALKING IN HOSPITAL ROOM: A LITTLE
DAILY ACTIVITIY SCORE: 15
EATING MEALS: A LITTLE
MOVING TO AND FROM BED TO CHAIR: A LITTLE
HELP NEEDED FOR BATHING: A LOT
PERSONAL GROOMING: A LITTLE
DRESSING REGULAR UPPER BODY CLOTHING: A LITTLE
HELP NEEDED FOR BATHING: A LITTLE

## 2025-01-22 ASSESSMENT — ACTIVITIES OF DAILY LIVING (ADL)
ADL_ASSISTANCE: INDEPENDENT
BATHING_ASSISTANCE: MODERATE
ADL_ASSISTANCE: INDEPENDENT
LACK_OF_TRANSPORTATION: NO
LACK_OF_TRANSPORTATION: NO

## 2025-01-22 ASSESSMENT — ENCOUNTER SYMPTOMS
ALLERGIC/IMMUNOLOGIC NEGATIVE: 1
ABDOMINAL DISTENTION: 1
HEMATOLOGIC/LYMPHATIC NEGATIVE: 1
CONSTIPATION: 1
RESPIRATORY NEGATIVE: 1
CONSTITUTIONAL NEGATIVE: 1
ENDOCRINE NEGATIVE: 1
CARDIOVASCULAR NEGATIVE: 1
EYES NEGATIVE: 1
MUSCULOSKELETAL NEGATIVE: 1
NEUROLOGICAL NEGATIVE: 1
PSYCHIATRIC NEGATIVE: 1

## 2025-01-22 ASSESSMENT — PAIN - FUNCTIONAL ASSESSMENT
PAIN_FUNCTIONAL_ASSESSMENT: 0-10

## 2025-01-22 ASSESSMENT — PAIN SCALES - GENERAL
PAINLEVEL_OUTOF10: 0 - NO PAIN

## 2025-01-22 NOTE — CONSULTS
"Nutrition Assessement Note    Nutrition Assessment    Reason for Assessment: Admission nursing screening    Reason for Hospital Admission:  Devin Gomez is a 64 y.o. male who is admitted for dehydration, hyperglycemia with DKA, pancolitis. In isolation for covid, flu, RSV r/o. Vomiting at this time- not appropriate to speak with.     Malnutrition Screening Tool (MST)  Have you recently lost weight without trying?: Unsure  If yes, how much weight have you lost?: Unsure  Weight Loss Score: 2  Have you been eating poorly because of a decreased appetite?: Yes  Malnutrition Score: 3  Nutrition Screen  Stage 3 or 4 Pressure Injury or Multiple Non-Healing Wounds: No  Home Tube Feeding or Total Parenteral Nutrition (TPN): No  Dietitian Consult Needed: No    Past Medical History:   Diagnosis Date    Diabetes mellitus (Multi)       History reviewed. No pertinent surgical history.    Nutrition History:  Food and Nutrient History: NPO at this time.  GI Symptoms: Nausea and Vomiting    Anthropometrics:  Ht: 175.3 cm (5' 9\"), Wt: 60 kg (132 lb 4.4 oz), BMI: 19.52  IBW/kg (Dietitian Calculated): 72.73 kg  Percent of IBW: 82 %     Weight Change:  Daily Weight  01/22/25 : 60 kg (132 lb 4.4 oz)  06/03/24 : 64.4 kg (142 lb)  04/10/23 : 65.8 kg (145 lb)  01/04/22 : 67.1 kg (148 lb)     Weight History / % Weight Change: 11# (7.7%) wt loss in wt hx - unknown exact time frame    Nutrition Focused Physical Exam Findings:  Subcutaneous Fat Loss  Defer Subcutaneous Fat Loss Assessment: Defer all    Muscle Wasting  Defer Muscle Wasting Assessment: Defer all    Nutrition Significant Labs:  Lab Results   Component Value Date    WBC 10.7 01/22/2025    HGB 11.7 (L) 01/22/2025    HCT 33.9 (L) 01/22/2025     01/22/2025    CHOL 172 07/31/2019    TRIG 74 07/31/2019     07/31/2019    ALT 32 01/21/2025    AST 17 01/21/2025     (L) 01/22/2025    K 3.7 01/22/2025    CL 98 01/22/2025    CREATININE 1.24 01/22/2025    BUN 29 (H) " 01/22/2025    CO2 17 (L) 01/22/2025    TSH 2.20 01/21/2025    PSA 0.2 11/02/2020    HGBA1C 10.2 (H) 01/21/2025     Nutrition Specific Medications:  [START ON 1/23/2025] aspirin, 81 mg, oral, Daily  heparin, 5,000 Units, subcutaneous, q8h  pantoprazole, 40 mg, intravenous, BID  piperacillin-tazobactam, 3.375 g, intravenous, q6h      dextrose 10 % in water (D10W), 150 mL/hr  dextrose 10 % in water (D10W), 150 mL/hr  dextrose 5%-0.45 % sodium chloride, 150 mL/hr, Last Rate: 150 mL/hr (01/22/25 1408)  insulin regular, 2 Units/hr, Last Rate: 2 Units/hr (01/22/25 1408)  lactated Ringer's, 150 mL/hr, Last Rate: Stopped (01/22/25 1335)      Dietary Orders (From admission, onward)       Start     Ordered    01/22/25 1247  May Participate in Room Service  ( ROOM SERVICE MAY PARTICIPATE)  Once        Question:  .  Answer:  Yes    01/22/25 1246                  Estimated Needs:   Estimated Energy Needs  Total Energy Estimated Needs in 24 hours (kCal): 1786 kCal  Energy Estimated Needs per kg Body Weight in 24 hours (kCal/kg): 30 kCal/kg  Method for Estimating Needs: actual wt    Estimated Protein Needs  Total Protein Estimated Needs in 24 Hours (g): 71 g  Protein Estimated Needs per kg Body Weight in 24 Hours (g/kg): 1.2 g/kg  Method for Estimating 24 Hour Protein Needs: actual wt    Estimated Fluid Needs  Method for Estimating 24 Hour Fluid Needs: 1 ml/kcal or per MD        Nutrition Diagnosis   Nutrition Diagnosis:  Malnutrition Diagnosis  Patient has Malnutrition Diagnosis: No    Nutrition Diagnosis  Patient has Nutrition Diagnosis: Yes  Diagnosis Status (1): New  Nutrition Diagnosis 1: Altered nutrition related to laboratory values  Related to (1): physiological causes  As Evidenced by (1): A1c 10.2% (1/21/25)       Nutrition Interventions/Recommendations   Nutrition Interventions and Recommendations:    Nutrition Recommendations:  Individualized Nutrition Prescription Provided for : 1786 kcals and 71g protein to be  provided when diet advanced    Nutrition Interventions/Goals:   Food and/or Nutrient Delivery Interventions  Interventions: Meals and snacks  Meals and Snacks: Carbohydrate-modified diet  Goal: CCD 60g diet when able to advance    Education Documentation  No documentation found.           Nutrition Monitoring and Evaluation   Monitoring/Evaluation:   Food/Nutrient Related History Monitoring  Monitoring and Evaluation Plan: Estimated Energy Intake  Estimated Energy Intake:  (monitor for diet advancement)      Biochemical Data, Medical Tests and Procedures  Monitoring and Evaluation Plan: Glucose/endocrine profile  Glucose/Endocrine Profile: Glucose within normal limits - ICU (140-180 mg/dL)    Goal Status: New goal(s) identified    Time Spent (min): 25 minutes

## 2025-01-22 NOTE — ED PROCEDURE NOTE
Procedure  Critical Care    Performed by: Elieser Boudreaux DO  Authorized by: Elieser Boudreaux DO    Critical care provider statement:     Critical care time (minutes):  35    Critical care time was exclusive of:  Separately billable procedures and treating other patients    Critical care was necessary to treat or prevent imminent or life-threatening deterioration of the following conditions:  Dehydration and metabolic crisis (arrhtyhmia)    Critical care was time spent personally by me on the following activities:  Ordering and performing treatments and interventions, ordering and review of laboratory studies, ordering and review of radiographic studies, pulse oximetry, evaluation of patient's response to treatment, re-evaluation of patient's condition, review of old charts, examination of patient, obtaining history from patient or surrogate and development of treatment plan with patient or surrogate    Care discussed with: admitting provider                 Elieser Boudreaux DO  01/22/25 5352

## 2025-01-22 NOTE — CONSULTS
"Inpatient consult to Cardiology  Consult performed by: Hubert Vidales MD  Consult ordered by: Rohan Boss MD  Reason for consult: Elevated troponin        History Of Present Illness:    Devin Gomez is a 64 y.o. male presenting with nausea fever chills and DKA.  Patient with longstanding history of diabetes, peripheral vascular disease status post iliac stents, hypertension and hyperlipidemia.  Patient for the last 48 hours has been complaining of weakness fever chills nausea vomiting diarrhea.  Patient initial workup suggestive of diabetic ketoacidosis with elevated lactic acid and elevated blood sugars in the 500 range.  Patient denies having chest pain.  I was asked to see him because his troponin were mildly elevated with significant delta.  EKG showing normal sinus rhythm nonspecific ST-T wave abnormalities..    Review of systems.  10 point review of systems otherwise negative.     Last Recorded Vitals:  Vitals:    01/21/25 1857 01/22/25 0056 01/22/25 0455 01/22/25 0832   BP: 158/83 147/68 147/71 115/55   BP Location: Left arm Left arm Left arm Left arm   Patient Position: Lying Lying Lying Lying   Pulse: 108 102 95 85   Resp: 20      Temp: 36.5 °C (97.7 °F) 36.7 °C (98.1 °F) 36.3 °C (97.3 °F) 36.9 °C (98.5 °F)   TempSrc: Temporal Temporal Temporal Temporal   SpO2: 100% 99% 98% 100%   Weight: 60.8 kg (134 lb)  59.8 kg (131 lb 12.8 oz)    Height: 1.753 m (5' 9\")          Last Labs:  CBC - 1/22/2025:  6:15 AM  10.7 11.7 190    33.9      CMP - 1/22/2025:  7:54 AM  8.8 7.2 17 --- 1.1   _ 4.5 32 88      PTT - 1/22/2025:  6:15 AM  _   _ 24.9     Troponin I, High Sensitivity   Date/Time Value Ref Range Status   01/22/2025 03:33  () 0 - 20 ng/L Final     Comment:     Previous result verified on 1/21/2025 2211 on specimen/case [id number] called with component [facility] [[email or web address]] for procedure Troponin I, High Sensitivity with value 59 ng/L.   01/22/2025 12:03  (HH) 0 - 20 ng/L Final     Comment:     Previous " result verified on 1/21/2025 2211 on specimen/case [id number] called with component [facility] for procedure Troponin I, High Sensitivity with value 59 ng/L.   01/21/2025 08:22 PM 59 (HH) 0 - 20 ng/L Final     BNP   Date/Time Value Ref Range Status   01/21/2025 02:03  (H) 0 - 99 pg/mL Final     Hemoglobin A1C   Date/Time Value Ref Range Status   03/18/2021 02:00 PM 8.9 (H) 4.0 - 6.0 % Final     Comment:     Hemoglobin A1C levels are related to mean blood glucose during the   preceding 2-3 months. The relationship table below may be used as a   general guide. Each 1% increase in HGB A1C is a reflection of an   increase in mean glucose of approximately 30 mg/dl.   Reference: Diabetes Care, volume 29, supplement 1 Jan. 2006                        HGB A1C ................. Approx. Mean Glucose   _______________________________________________   6%   ...............................  120 mg/dl   7%   ...............................  150 mg/dl   8%   ...............................  180 mg/dl   9%   ...............................  210 mg/dl   10%  ...............................  240 mg/dl  Performed at [address] [address]     10/30/2020 09:02 PM 8.3 (H) 4.0 - 6.0 % Final     Comment:     Hemoglobin A1C levels are related to mean blood glucose during the   preceding 2-3 months. The relationship table below may be used as a   general guide. Each 1% increase in HGB A1C is a reflection of an   increase in mean glucose of approximately 30 mg/dl.   Reference: Diabetes Care, volume 29, supplement 1 Jan. 2006                        HGB A1C ................. Approx. Mean Glucose   _______________________________________________   6%   ...............................  120 mg/dl   7%   ...............................  150 mg/dl   8%   ...............................  180 mg/dl   9%   ...............................  210 mg/dl   10%  ...............................  240 mg/dl  Performed at [facility]  "[id number] [facility] [address]       LDL Calculated   Date/Time Value Ref Range Status   07/31/2019 08:45 AM 55 (L) 65 - 130 MG/DL Final      Last I/O:  I/O last 3 completed shifts:  In: 1460 (24.4 mL/kg) [I.V.:460 (7.7 mL/kg); IV Piggyback:1000]  Out: 1100 (18.4 mL/kg) [Urine:1100 (0.5 mL/kg/hr)]  Weight: 59.8 kg     Past Cardiology Tests (Last 3 Years):  EKG:  ECG 12 lead 01/22/2025 (Preliminary)    Echo:  No results found for this or any previous visit from the past 1095 days.    Ejection Fractions:  No results found for: \"EF\"  Cath:  No results found for this or any previous visit from the past 1095 days.    Stress Test:  No results found for this or any previous visit from the past 1095 days.    Cardiac Imaging:  No results found for this or any previous visit from the past 1095 days.      Past Medical History:  He has a past medical history of Diabetes mellitus (Multi).    Past Surgical History:  He has no past surgical history on file.      Social History:  He reports that he has never smoked. He has never used smokeless tobacco. He reports that he does not currently use alcohol. He reports that he does not currently use drugs.    Family History:  No family history on file.     Allergies:  Patient has no known allergies.    Inpatient Medications:  Scheduled medications   Medication Dose Route Frequency    [START ON 1/23/2025] aspirin  81 mg oral Daily    insulin lispro  0-10 Units subcutaneous TID AC    piperacillin-tazobactam  3.375 g intravenous q6h     PRN medications   Medication    acetaminophen    Or    acetaminophen    Or    acetaminophen    dextrose    dextrose    glucagon    glucagon    heparin (porcine)    hydrALAZINE    melatonin    ondansetron ODT    Or    ondansetron    promethazine     Continuous Medications   Medication Dose Last Rate    heparin  0-4,000 Units/hr 700 Units/hr (01/22/25 0644)    sodium chloride  150 mL/hr 150 mL/hr (01/22/25 0516)     Outpatient Medications:  Current " "Outpatient Medications   Medication Instructions    blood sugar diagnostic (True Metrix Glucose Test Strip) strip TEST 4 TIMES DAILY    gabapentin (NEURONTIN) 100 mg, oral, 3 times daily    insulin aspart (NovoLOG U-100 Insulin aspart) 100 unit/mL injection AS DIRECTED SUBCUTANEOUS SLIDING SCALE AS DIRECTED( MAX DOSE 45 U) 90 DAY(S)    insulin NPH, Isophane, (NovoLIN N NPH U-100 Insulin) 100 unit/mL injection 20 UNITS SUBCUTANEOUS DAILY 90 DAY(S)    insulin syringe-needle U-100 (BD Insulin Syringe Ultra-Fine) 31G X 5/16\" 0.5 mL syringe USE THREE TIMES DAILY 90 DAY(S)    triamcinolone (Kenalog) 0.1 % cream Topical, 2 times daily, Apply to affected area 1-2 times daily as needed.       Physical Exam:  General: Patient is in no acute distress.  HEENT: atraumatic normocephalic.  Neck: is supple jugular venous pressure within normal limits no thyromegaly.  Cardiovascular regular rate and rhythm normal heart sounds no murmurs rubs or gallops.  Lungs: clear to auscultation bilaterally.  Abdomen: is soft mild tenderness to palpation.  No guarding or rebound.  Extremities warm to touch no edema.  Neurologic examination: patient is awake alert oriented to person, place, date/time.  Psychiatric examination: patient has good insight denies feeling suicidal and depressed.  Pulses 2+ intact bilaterally     Assessment/Plan   #1 elevated troponin.  Patient with history of diabetes hypertension hyperlipidemia and peripheral vascular disease admitted to the hospital with weakness nausea vomiting severe abdominal pain and diarrhea.  Abdominal CT showed pancolitis.  Troponin mildly elevated.  EKG within normal limit.  Patient has no chest pain.  Whether nausea is related to acute coronary syndrome I do believe is less likely most likely this is secondary to his pancolitis.  Undergoing workup for norovirus infection.  I do not believe that patient has acute coronary syndrome especially that his EKG within normal limit but I do recommend " him to be on aspirin.  Do not recommend heparin for now.  Will wait for 24 for 8 hours to assess improvement of his abdomen.  Await 2D echo.  He may need or stress test or cardiac catheterization within the next 48 hours pending diagnosis of his pancolitis as well as medical improvement.    2.  Peripheral vascular disease.  Recommend baby aspirin high intensity statin once he is more stable.  Control of blood pressure.    3.  Diabetes mellitus.  Patient admitted with DKA picture likely secondary to pancolitis.  Blood sugars improving.  Anion gap closing.  Will monitor.  Management by primary team.    4.  Hypertension.  Blood pressure and heart rate well-controlled.  Will monitor.  5.  Hyperlipidemia.  Recommend to start high intensity statin once he is more stable.      Peripheral IV 01/21/25 18 G Left Antecubital (Active)   Site Assessment Clean;Dry;Intact 01/22/25 0807   Dressing Status Clean;Dry;Occlusive 01/22/25 0807   Number of days: 1       Peripheral IV 01/21/25 20 G Right;Dorsal Wrist (Active)   Site Assessment Clean;Dry;Intact 01/22/25 0807   Dressing Status Clean;Dry;Occlusive 01/22/25 0807   Number of days: 1       Code Status:  Full Code      Hubert Vidales MD

## 2025-01-22 NOTE — CARE PLAN
The patient's goals for the shift include      The clinical goals for the shift include manage nausea

## 2025-01-22 NOTE — CARE PLAN
Problem: Diabetes  Goal: Achieve decreasing blood glucose levels by end of shift  Outcome: Progressing  Goal: Increase stability of blood glucose readings by end of shift  Outcome: Progressing  Goal: Decrease in ketones present in urine by end of shift  Outcome: Progressing  Goal: Maintain electrolyte levels within acceptable range throughout shift  Outcome: Progressing  Goal: Maintain glucose levels >70mg/dl to <250mg/dl throughout shift  Outcome: Progressing  Goal: No changes in neurological exam by end of shift  Outcome: Progressing  Goal: Learn about and adhere to nutrition recommendations by end of shift  Outcome: Progressing  Goal: Vital signs within normal range for age by end of shift  Outcome: Progressing  Goal: Increase self care and/or family involovement by end of shift  Outcome: Progressing  Goal: Receive DSME education by end of shift  Outcome: Progressing     Problem: Pain - Adult  Goal: Verbalizes/displays adequate comfort level or baseline comfort level  Outcome: Progressing     Problem: Safety - Adult  Goal: Free from fall injury  Outcome: Progressing     Problem: Discharge Planning  Goal: Discharge to home or other facility with appropriate resources  Outcome: Progressing     Problem: Chronic Conditions and Co-morbidities  Goal: Patient's chronic conditions and co-morbidity symptoms are monitored and maintained or improved  Outcome: Progressing     Problem: Fall/Injury  Goal: Not fall by end of shift  Outcome: Progressing  Goal: Be free from injury by end of the shift  Outcome: Progressing  Goal: Verbalize understanding of personal risk factors for fall in the hospital  Outcome: Progressing  Goal: Verbalize understanding of risk factor reduction measures to prevent injury from fall in the home  Outcome: Progressing  Goal: Use assistive devices by end of the shift  Outcome: Progressing  Goal: Pace activities to prevent fatigue by end of the shift  Outcome: Progressing     Problem: Pain  Goal:  Takes deep breaths with improved pain control throughout the shift  Outcome: Progressing  Goal: Turns in bed with improved pain control throughout the shift  Outcome: Progressing  Goal: Walks with improved pain control throughout the shift  Outcome: Progressing  Goal: Performs ADL's with improved pain control throughout shift  Outcome: Progressing  Goal: Participates in PT with improved pain control throughout the shift  Outcome: Progressing  Goal: Free from opioid side effects throughout the shift  Outcome: Progressing  Goal: Free from acute confusion related to pain meds throughout the shift  Outcome: Progressing   The patient's goals for the shift include  comfort    The clinical goals for the shift include get better    Over the shift, the patient did not make progress toward the following goals. Barriers to progression include none. Recommendations to address these barriers include none.

## 2025-01-22 NOTE — ASSESSMENT & PLAN NOTE
Pancolitis  Complaining of stomach cramping  Abdomen pelvis showed pancolitis  Elevated lactate  IV fluids half-normal saline at 150 an hour  Clear liquid diet  Stool panel  Zosyn  Consult gastroenterology  Type 2 diabetes mellitus with hyperglycemia  Blood glucose 283 and will give 5 units IV insulin  Check ABG  Check blood glucose every 2 hours  Check BMP every 4 hours  High blood pressure without diagnosis of hypertension  Hydralazine for systolic blood pressure greater than 160  Diffuse abdominal aorta plaque  Consider vascular consult outpatient  Urinary bladder distention  Bladder scan for over 1100  Patient was straight cath  Will do bladder scans every 6 hours and straight cath for residual greater than 350 mL  DVT prophylaxis  Lovenox

## 2025-01-22 NOTE — PROGRESS NOTES
Neurology Daily Progress Note     Assessment:     49-year-old woman with an acute toxic leukoencephalopathy and stroke. Overall, stroke is playing a minor role in her symptomatology. Plan:     Continue IV Solu-Medrol    We may also consider EEG for further prognosis    Unfortunately, I suspect a very poor neurological prognosis given the severity of her MRI findings and neurological examination which is worse today than yesterday. Subjective: Interval history:    Obtunded. Does not respond to verbal stimulation.  is at bedside and is very supportive    History:    Aliya Silva is a 69 y.o. female who is being seen for toxic leukoencephalopathy    Review of systems negative with exception of pertinent positives and negatives noted above.        Objective:     Vitals:    04/15/22 1947 04/15/22 2328 04/16/22 0300 04/16/22 0758   BP: 116/83 114/67 123/83 132/85   Pulse: 93 (!) 107 99 (!) 104   Resp: 16 16 16 18   Temp: 98.3 °F (36.8 °C) 98.5 °F (36.9 °C) 98.7 °F (37.1 °C) 98.9 °F (37.2 °C)   SpO2: 97% 98% 98% 99%   Weight:  125 lb 6.4 oz (56.9 kg)     Height:              Current Facility-Administered Medications:     0.9% sodium chloride infusion 250 mL, 250 mL, IntraVENous, PRN, Jana Ko MD    potassium chloride 40 mEq IVPB, 40 mEq, IntraVENous, ONCE, Jana Ko MD, Last Rate: 25 mL/hr at 04/16/22 0715, 40 mEq at 04/16/22 0715    0.9% sodium chloride infusion 250 mL, 250 mL, IntraVENous, PRN, Ze Moncada MD    methylPREDNISolone (PF) (Solu-MEDROL) 500 mg in 0.9% sodium chloride 100 mL IVPB, 500 mg, IntraVENous, BID, Ramon Angeles DO, Last Rate: 200 mL/hr at 04/16/22 1008, 500 mg at 04/16/22 1008    pantoprazole (PROTONIX) 40 mg in 0.9% sodium chloride 10 mL injection, 40 mg, IntraVENous, DAILY, Ramon Angeles DO, 40 mg at 04/16/22 1005    silver sulfADIAZINE (SILVADENE) 1 % topical cream, , Topical, DAILY, Jana Ko MD    sodium chloride (NS) flush Occupational Therapy    Evaluation    Patient Name: Devin Gomez  MRN: [id number]  Department: [facility]  Room: [address] [place], [place] [address]  Today's Date: 1/22/2025  Time Calculation  Start Time: 1004  Stop Time: 1023  Time Calculation (min): 19 min    Assessment  IP OT Assessment  OT Assessment: 63 y/o male presenting with hyperglycemia, elevated troponins, pancolitis. On eval he reqiures increased assist for xfers, mobility, and self care d/t decreased strength, balance, activity tolerance. Would benefit from skilled OT while in house to maximize safety/IND prior to DC.  Prognosis: Good  Barriers to Discharge Home: Caregiver assistance, Physical needs  Caregiver Assistance: Patient lives alone and/or does not have reliable caregiver assistance  Physical Needs: Intermittent mobility assistance needed, Intermittent ADL assistance needed  Evaluation/Treatment Tolerance: Patient limited by fatigue, Treatment limited secondary to medical complications (Comment) (nausea)  Medical Staff Made Aware: Yes  End of Session Communication: Bedside nurse  End of Session Patient Position: Bed, 3 rail up, Alarm on  Plan:  Treatment Interventions: ADL retraining, Functional transfer training, UE strengthening/ROM, Endurance training, Patient/family training, Equipment evaluation/education, Neuromuscular reeducation, Compensatory technique education  OT Frequency: 3 times per week  OT Discharge Recommendations: Low intensity level of continued care (anticipate as medical status improves he will progress to home with low needs)  Equipment Recommended upon Discharge: Wheeled walker, Wheelchair  OT Recommended Transfer Status: Assist of 1  OT - OK to Discharge: Yes    Subjective   Current Problem:  1. Dehydration        2. Hyperglycemia        3. Intractable nausea and vomiting        4. NSTEMI (non-ST elevated myocardial infarction) (Multi)  Transthoracic Echo (TTE) Complete    Transthoracic Echo (TTE) Complete        General:  General  Reason for  5-40 mL, 5-40 mL, IntraVENous, Q8H, Chente Hernandez MD, 10 mL at 04/15/22 2219    sodium chloride (NS) flush 5-40 mL, 5-40 mL, IntraVENous, PRN, Chente Hernandez MD    acetaminophen (TYLENOL) tablet 650 mg, 650 mg, Oral, Q6H PRN **OR** acetaminophen (TYLENOL) suppository 650 mg, 650 mg, Rectal, Q6H PRN, Chente Hernandez MD    polyethylene glycol (MIRALAX) packet 17 g, 17 g, Oral, DAILY PRN, Chente Hernandez MD    ondansetron (ZOFRAN ODT) tablet 4 mg, 4 mg, Oral, Q8H PRN **OR** ondansetron (ZOFRAN) injection 4 mg, 4 mg, IntraVENous, Q6H PRN, Chente Hernandez MD    dextrose 5% - 0.45% NaCl with KCl 20 mEq/L 1,000 mL with sodium bicarbonate (8.4%) 50 mEq infusion, , IntraVENous, CONTINUOUS, Ciesco, Victorino, DO, Last Rate: 125 mL/hr at 04/16/22 0445, New Bag at 04/16/22 0445    cefepime (MAXIPIME) 1 g in 0.9% sodium chloride (MBP/ADV) 50 mL MBP, 1 g, IntraVENous, Q24H, Ciesco, Victorino, DO, Last Rate: 100 mL/hr at 04/16/22 1006, 1 g at 04/16/22 1006    sodium chloride (NS) flush 5-10 mL, 5-10 mL, IntraVENous, Q8H, Yvrose Vasquez MD, 10 mL at 04/15/22 2219    sodium chloride (NS) flush 5-10 mL, 5-10 mL, IntraVENous, PRN, Nellene Kayser, Faith M, MD    0.9% sodium chloride infusion 250 mL, 250 mL, IntraVENous, PRN, Nolvia SANDOVAL MD    Recent Results (from the past 12 hour(s))   CBC WITH AUTOMATED DIFF    Collection Time: 04/16/22  3:25 AM   Result Value Ref Range    WBC 0.2 (LL) 4.3 - 11.1 K/uL    RBC 2.37 (L) 4.05 - 5.2 M/uL    HGB 6.3 (LL) 11.7 - 15.4 g/dL    HCT 19.2 (L) 35.8 - 46.3 %    MCV 81.0 79.6 - 97.8 FL    MCH 26.6 26.1 - 32.9 PG    MCHC 32.8 31.4 - 35.0 g/dL    RDW 18.6 (H) 11.9 - 14.6 %    PLATELET 31 (L) 383 - 450 K/uL    MPV 9.8 9.4 - 12.3 FL    ABSOLUTE NRBC 0.00 0.0 - 0.2 K/uL    DF AUTOMATED      NEUTROPHILS 29 (L) 43 - 78 %    LYMPHOCYTES 71 (H) 13 - 44 %    MONOCYTES 0 (L) 4.0 - 12.0 %    EOSINOPHILS 0 (L) 0.5 - 7.8 %    BASOPHILS 0 0.0 - 2.0 %    IMMATURE GRANULOCYTES 0 0.0 - 5.0 %    ABS.  NEUTROPHILS 0.1 Referral: Decreased ADLs, hyperglycemia, pancolitis, n/v/d  Referred By: Dr. Knowles  Past Medical History Relevant to Rehab: DM with neuropathy, PVD, HTN, hyperlipidemia  Family/Caregiver Present: No  Co-Treatment: PT  Co-Treatment Reason: d/t decreased activity tolerance  Prior to Session Communication: Bedside nurse  Patient Position Received: Alarm on, Bed, 3 rail up  Preferred Learning Style: verbal, visual, auditory  General Comment: Cleared by nsg, pt met in supine, agreeable to OT assessment    Precautions:  Hearing/Visual Limitations: + glasses  Medical Precautions: Fall precautions, Infection precautions  Precautions Comment: contact plus/droplet, r/o noro.  troponins elevated, medical mgmt per cardiology 1/22    Pain:  Pain Assessment  Pain Assessment: 0-10  0-10 (Numeric) Pain Score: 0 - No pain    Objective   Cognition:  Overall Cognitive Status: Impaired  Arousal/Alertness: Generalized responses  Orientation Level: Oriented X4  Following Commands: Follows multistep commands with increased time  Cognition Comments: very flat affect. questionable historian. lethargic.    Home Living:  Type of Home: House  Lives With: Alone  Home Adaptive Equipment: Walker rolling or standard, Wheelchair-manual, Scooter  Home Layout: Multi-level, Laundry second level  Home Access: No concerns, Level entry  Bathroom Toilet: Standard  Home Living Comments: laundry is on second story which he as a full flight of stairs to negotiate     Prior Function:  Level of [place]: Independent with ADLs and functional transfers, Independent with homemaking with ambulation  ADL Assistance: Independent  Homemaking Assistance: Independent  Ambulatory Assistance: Independent (with device at all times. primarily w/c level in home per pt report)  Prior Function Comments: limited social support? + drives. denies recent falls.    ADL:  Eating Assistance: Stand by  Eating Deficit: Setup  Grooming Assistance: Stand by  Grooming Deficit:  (L) 1.7 - 8.2 K/UL    ABS. LYMPHOCYTES 0.1 (L) 0.5 - 4.6 K/UL    ABS. MONOCYTES 0.0 (L) 0.1 - 1.3 K/UL    ABS. EOSINOPHILS 0.0 0.0 - 0.8 K/UL    ABS. BASOPHILS 0.0 0.0 - 0.2 K/UL    ABS. IMM. GRANS. 0.0 0.0 - 0.5 K/UL   METABOLIC PANEL, COMPREHENSIVE    Collection Time: 04/16/22  3:25 AM   Result Value Ref Range    Sodium 151 (H) 136 - 145 mmol/L    Potassium 3.2 (L) 3.5 - 5.1 mmol/L    Chloride 125 (H) 98 - 107 mmol/L    CO2 16 (L) 21 - 32 mmol/L    Anion gap 10 7 - 16 mmol/L    Glucose 200 (H) 65 - 100 mg/dL    BUN 47 (H) 8 - 23 MG/DL    Creatinine 1.70 (H) 0.6 - 1.0 MG/DL    GFR est AA 38 (L) >60 ml/min/1.73m2    GFR est non-AA 32 (L) >60 ml/min/1.73m2    Calcium 8.9 8.3 - 10.4 MG/DL    Bilirubin, total 0.5 0.2 - 1.1 MG/DL    ALT (SGPT) 14 12 - 65 U/L    AST (SGOT) 17 15 - 37 U/L    Alk. phosphatase 46 (L) 50 - 136 U/L    Protein, total 5.3 (L) 6.3 - 8.2 g/dL    Albumin 1.7 (L) 3.2 - 4.6 g/dL    Globulin 3.6 (H) 2.3 - 3.5 g/dL    A-G Ratio 0.5 (L) 1.2 - 3.5     RBC, ALLOCATE    Collection Time: 04/16/22  5:45 AM   Result Value Ref Range    HISTORY CHECKED? Historical check performed          Physical Exam:    General: Obtunded  HEENT - Normocephalic, atraumatic. Conjunctiva, tympanic membranes, and oropharynx are clear. Neck - Supple without masses, no bruits   Cardiovascular - Regular rate and rhythm. Normal S1, S2 without murmurs, rubs, or gallops. Lungs - Clear to auscultation. Abdomen - Soft, nontender with normal bowel sounds. Extremities - Peripheral pulses intact. No edema and no rashes.      Neurological examination -     Comprehension is poor. Attention is poor. She is currently nonverbal.  Minor facial droop on the left. Language and speech cannot be assessed. On cranial nerve examination pupils are equal round and reactive to light. She could not participate in funduscopic examination, visual acuity, visual field testing, or extraocular motility testing due to altered mental status.  Motor Setup  Bathing Assistance: Moderate  Bathing Deficit:  (anticipated)  UE Dressing Assistance: Minimal  UE Dressing Deficit: Pull around back (gown mgmt)  LE Dressing Assistance: Maximal  LE Dressing Deficit: Don/doff L sock, Don/doff R sock (while sitting EOB)  Toileting Assistance with Device: Moderate  Toileting Deficit:  (anticipated)  ADL Comments: all activity is effortful and requires increased time d/t pt's c/o nausea and generalized malaise    Activity Tolerance:  Endurance: Decreased tolerance for upright activites  Activity Tolerance Comments: poor    Bed Mobility/Transfers:   Bed Mobility  Bed Mobility: Yes  Bed Mobility 1  Bed Mobility 1: Supine to sitting  Level of Assistance 1: Close supervision  Bed Mobility Comments 1: HOB elevated  Bed Mobility 2  Bed Mobility  2: Sitting to supine  Level of Assistance 2: Close supervision  Bed Mobility Comments 2: HOB elevated  Bed Mobility 3  Bed Mobility 3: Scooting  Level of Assistance 3: Dependent  Bed Mobility Comments 3: to scoot towards HOB in supine    Transfers  Transfer: No (pt declined despite encouragement)    Functional Mobility:  Functional Mobility  Functional Mobility Performed: No    Sitting Balance:  Static Sitting Balance  Static Sitting-Balance Support: Feet supported, Bilateral upper extremity supported  Static Sitting-Level of Assistance: Close supervision  Static Sitting-Comment/Number of Minutes: EOB sitting for ~10 minutes    Vision: Vision - Basic Assessment  Current Vision: Wears glasses all the time  Sensation:  Light Touch: Severe deficits in the RUE, Severe deficits in the LUE (chronic diabetic neuropathy)  Strength:  Strength Comments: BUEs at least >/= 3/5, observed functionally  Coordination:  Movements are Fluid and Coordinated: No  Upper Body Coordination: tremulous  Lower Body Coordination: tremulous   Hand Function:  Hand Function  Gross Grasp: Functional  Coordination: Impaired  Extremities: RUE   RUE : Exceptions to WFL  (generalized weakness) and TAYEE   ROULA: Exceptions to WFL (generalized weakness)    Outcome Measures: [facility] Daily Activity  Putting on and taking off regular lower body clothing: A lot  Bathing (including washing, rinsing, drying): A lot  Putting on and taking off regular upper body clothing: A little  Toileting, which includes using toilet, bedpan or urinal: A lot  Taking care of personal grooming such as brushing teeth: A little  Eating Meals: A little  Daily Activity - Total Score: 15      Education Documentation  Body Mechanics, taught by Levi Greenberg OT at 1/22/2025 10:36 AM.  Learner: Patient  Readiness: Acceptance  Method: Explanation  Response: Needs Reinforcement  Comment: emphasis on safety precautions, fall precautions    Precautions, taught by Levi Greenberg OT at 1/22/2025 10:36 AM.  Learner: Patient  Readiness: Acceptance  Method: Explanation  Response: Needs Reinforcement  Comment: emphasis on safety precautions, fall precautions    ADL Training, taught by Levi Greenberg OT at 1/22/2025 10:36 AM.  Learner: Patient  Readiness: Acceptance  Method: Explanation  Response: Needs Reinforcement  Comment: emphasis on safety precautions, fall precautions    Education Comments  No comments found.      Goals:   Encounter Problems       Encounter Problems (Active)       OT Goals       ADLS (Progressing)       Start:  01/22/25    Expected End:  02/05/25       Patient will complete ADL tasks, with modified independence using AE need in order to increase patient's safety and independence with self-care tasks.           Functional Transfers (Progressing)       Start:  01/22/25    Expected End:  02/05/25       Patient will complete functional transfers with modified independence using least restrictive device, in order to increase patient's safety and independence with daily tasks.           Activity Tolerance (Progressing)       Start:  01/22/25    Expected End:  02/05/25       Patient will demonstrate the ability to  examination: On motor examination, the patient has increased muscle tone throughout with continuous clonus in the feet, bilaterally. She cannot participate in strength testing. Muscle stretch reflexes are absent in the bilateral lower extremities and 1+ at the brachioradialis, bilaterally. Toes are downgoing to plantar stimulation.   She cannot participate in sensory examination, cerebellar examination, gait or stance    Signed By: Eddi Neal DO     April 16, 2022 participate in functional activity at least >/= 20 minutes in order to increase patient's safety and independence with daily tasks.

## 2025-01-22 NOTE — PROGRESS NOTES
Physical Therapy    Physical Therapy Evaluation    Patient Name: Devin Gomez  MRN: [id number]  Department: [address]  Room: [address] [place] [address]  Today's Date: 1/22/2025   Time Calculation  Start Time: 1007  Stop Time: 1023  Time Calculation (min): 16 min    Assessment/Plan   PT Assessment  PT Assessment Results: Decreased strength, Decreased endurance, Impaired balance, Decreased mobility, Decreased coordination, Decreased safety awareness  Rehab Prognosis: Fair  Barriers to Discharge Home: Physical needs  Physical Needs: Intermittent mobility assistance needed  Evaluation/Treatment Tolerance: Patient limited by fatigue (Limited by c/o nausea)  Medical Staff Made Aware: Yes  End of Session Communication: Bedside nurse  End of Session Patient Position: Bed, 3 rail up, Alarm on      Assessment Comment: 65 y/o male who presented to ED with generalized discomfort, hyperglycemia, pancolitis. Admitted for further management. Pt inconsistent with responses and a quesitonable historian when attempting to gather information regarding home set-up and prior level of function. Currently limited in mobility by impaired balance decreased strength and generalized pain. Pt appears to be limited primarily by decreased activity tolerance and c/o nausea. Anticipate increased mobiltiy as medical status improves. Will monitor progress and update POC as appropriate.        IP OR SWING BED PT PLAN  Inpatient or Swing Bed: Inpatient  PT Plan  Treatment/Interventions: Bed mobility, Transfer training, Gait training, Balance training, Neuromuscular re-education, Strengthening, Stair training, Endurance training, Therapeutic exercise, Therapeutic activity  PT Plan: Ongoing PT  PT Frequency: 3 times per week  PT Discharge Recommendations: Low intensity level of continued care (daily, family assist)  Equipment Recommended upon Discharge:  (has walker, wc)  PT Recommended Transfer Status: Assist x1  PT - OK to Discharge: Yes    Subjective   General Visit  Information:  General  Reason for Referral: Impaired mobility; hyperglycemia, pancolitis, n/v/d  Past Medical History Relevant to Rehab: DM with neuropathy, PVD, HTN, hyperlipidemia  Family/Caregiver Present: No  Co-Treatment: OT  Co-Treatment Reason: d/t decreased activity tolerance  Prior to Session Communication: Bedside nurse  Patient Position Received: Bed, 2 rail up, Alarm off, caregiver present (OT at bedside)  Preferred Learning Style: verbal, visual, auditory  General Comment: 63 y/o male who presented to ED with N/V/D, generalized discomfort and hyperglycemia. Pt cleared for mobility per nursing. Cardiology at bedside; reported medical management of hyperglycemia and infection prior to further cardiac testing. Pt sitting on EOB upon PT arrival, PIV. OT at bedside. Heparin drip.  Home Living:  Home Living  Type of Home: House  Lives With: Alone  Home Adaptive Equipment: Walker rolling or standard, Wheelchair-manual, Scooter  Home Layout: Multi-level, Laundry second level  Home Access: No concerns, Level entry  Bathroom Toilet: Standard  Home Living Comments: Pt is a quesitonable historian with limited responses to questions. Reported laundry is on second floor, however indicated use of wc at times.  Prior Level of Function:  Prior Function Per Pt/Caregiver Report  Level of [place]: Independent with ADLs and functional transfers, Independent with homemaking with ambulation  ADL Assistance: Independent  Homemaking Assistance: Independent  Ambulatory Assistance: Independent (with device at all times. primarily w/c level in home per pt report)  Prior Function Comments: Questionable social support. Inconsistent responses throughout session regarding home set-up and PLOF.  Precautions:  Precautions  Medical Precautions: Fall precautions, Infection precautions       Vital Signs Comment: VSS during session     Objective   Pain:  Pain Assessment  0-10 (Numeric) Pain Score:  (generalized c/o  "nausea)  Cognition:  Cognition  Orientation Level: Oriented X4  Cognition Comments: Flat affect. able to follow motor commands, when willing. Decreased participation this date due to c/o nausea.    General Assessments:  General Observation  General Observation: Limited by c/o nausea     Activity Tolerance  Endurance: Decreased tolerance for upright activites  Activity Tolerance Comments: c/o nausea; poor tolerance to mobility    Sensation  Sensation Comment: decreased sensation to light touch B lower legs distal of knees    Strength  Strength Comments: B LEs > 3/5 observed with bed mobility  Coordination  Upper Body Coordination: tremulous  Lower Body Coordination: tremulous     Static Sitting Balance  Static Sitting-Level of Assistance: Modified independent  Static Sitting-Comment/Number of Minutes: unsupported sitting on EOB, upon PT arrival  Dynamic Sitting Balance  Dynamic Sitting-Comments: wt shifting and using emesis basin while sitting on EOB, no LOB. supervision       Functional Assessments:  Bed Mobility 1  Bed Mobility 1: Sitting to supine  Level of Assistance 1: Close supervision  Bed Mobility Comments 1: HOB elevated  Bed Mobility 2  Bed Mobility Comments 2: Pt able to bridge  and elevate buttocks off bed to allow for repositining of sheets/linens    Transfers  Transfer: No (Pt declined standing and transfer to [facility] despite encouagement. \"I'm too nauseous right now.\")       Extremity/Trunk Assessments:  RLE   RLE : Exceptions to WFL  Strength RLE  RLE Overall Strength: Greater than or equal to 3/5 as evidenced by functional mobility  LLE   LLE : Exceptions to WFL  Strength LLE  LLE Overall Strength: Greater than or equal to 3/5 as evidenced by functional mobility  Outcome Measures:  [facility] Basic Mobility  Turning from your back to your side while in a flat bed without using bedrails: A little  Moving from lying on your back to sitting on the side of a flat bed without using bedrails: A little  Moving to " and from bed to chair (including a wheelchair): A little  Standing up from a chair using your arms (e.g. wheelchair or bedside chair): A little  To walk in hospital room: A lot  Climbing 3-5 steps with railing: Total  Basic Mobility - Total Score: 15    Encounter Problems       Encounter Problems (Active)       Balance       dynamic (Progressing)       Start:  01/22/25    Expected End:  02/05/25       Pt will perform Tinetti assessment and score >/= 24/28, resulting in a low falls risk             Mobility       LTG - Patient will be able to go up and down a curb/step with the appropriate device (Progressing)       Start:  01/22/25    Expected End:  02/05/25            LTG - Patient will navigate 4-6 steps with rails/device (Progressing)       Start:  01/22/25    Expected End:  02/05/25            ambulation (Progressing)       Start:  01/22/25    Expected End:  02/05/25       Pt will amb > 100 ft with wheeled walker/LRAD and mod independence          endurance (Progressing)       Start:  01/22/25    Expected End:  02/05/25       Pt will tolerate > 20 minutes of activity with stable vital signs and decreased c/o SOB/nausea.            PT Transfers       sit to stand (Progressing)       Start:  01/22/25    Expected End:  02/05/25       Pt will perform sit to stand transfer with LRAD and mod independence.             Pain - Adult          Safety       LTG - Patient will utilize safety techniques (Progressing)       Start:  01/22/25    Expected End:  02/05/25                   Education Documentation  Precautions, taught by Nina Walker PT at 1/22/2025 11:55 AM.  Learner: Patient  Readiness: Acceptance  Method: Explanation  Response: Needs Reinforcement  Comment: PT POC    Body Mechanics, taught by Nina Walker PT at 1/22/2025 11:55 AM.  Learner: Patient  Readiness: Acceptance  Method: Explanation  Response: Needs Reinforcement  Comment: PT POC    Mobility Training, taught by Nina Walker PT at 1/22/2025 11:55  BRAYDON.  Learner: Patient  Readiness: Acceptance  Method: Explanation  Response: Needs Reinforcement  Comment: PT POC    Education Comments  No comments found.

## 2025-01-22 NOTE — CONSULTS
Inpatient consult to gastroenterology  Consult performed by: Mary Frazier, APRN-CNP  Consult ordered by: Ismael Knowles MD        Reason For Consult  Pancolitis    History Of Present Illness  Devin Gomez is a 64 y.o. male presenting with generalized discomfort and hyperglycemia. He reports having intermittent abdominal cramping and had one episode of diarrhea, however he states longstanding history of chronic constipation. CT abdomen pelvis showed no dilated loops of small bowel or colon, + wall thickening seen throughout the colon. Patient denies any history of IBD. Patient denies any prior EGD/colonoscopy.  Labs show no leukocytosis, stable H&H at 11.7/33.9.  Elevated BUN at 27.  Normal lactate.   Patient seen and evaluated this morning, he denies any current abdominal pain, nausea, vomiting, diarrhea. He denies any alcohol use, no NSAID use.     Past Medical History  He has a past medical history of Diabetes mellitus (Multi).    Surgical History  He has no past surgical history on file.     Social History  He reports that he has never smoked. He has never used smokeless tobacco. He reports that he does not currently use alcohol. He reports that he does not currently use drugs.    Family History  No family history on file.     Allergies  Patient has no known allergies.    Review of Systems   Constitutional: Negative.    HENT: Negative.     Eyes: Negative.    Respiratory: Negative.     Cardiovascular: Negative.    Gastrointestinal:  Positive for abdominal distention and constipation.   Endocrine: Negative.    Genitourinary: Negative.    Musculoskeletal: Negative.    Skin: Negative.    Allergic/Immunologic: Negative.    Neurological: Negative.    Hematological: Negative.    Psychiatric/Behavioral: Negative.          Physical Exam  HENT:      Head: Normocephalic.      Nose: Nose normal.   Eyes:      Pupils: Pupils are equal, round, and reactive to light.   Cardiovascular:      Rate and Rhythm:  "Normal rate.   Pulmonary:      Effort: Pulmonary effort is normal.   Abdominal:      Palpations: Abdomen is soft.   Musculoskeletal:         General: Normal range of motion.      Cervical back: Normal range of motion.   Skin:     General: Skin is warm.   Neurological:      General: No focal deficit present.      Mental Status: He is alert.   Psychiatric:         Mood and Affect: Mood normal.          Last Recorded Vitals  Blood pressure (!) 171/92, pulse 89, temperature 36.6 °C (97.9 °F), temperature source Temporal, resp. rate 18, height 1.753 m (5' 9\"), weight 59.8 kg (131 lb 12.8 oz), SpO2 97%.    Relevant Results  XR chest 1 view    Result Date: 1/22/2025  Interpreted By:  Radha Sandoval, STUDY: XR CHEST 1 VIEW 1/22/2025 11:57 am   INDICATION: Hyperglycemia   COMPARISON: 01/21/2025   ACCESSION NUMBER(S): [id number]   ORDERING CLINICIAN: BILLY ESPINAL   TECHNIQUE: AP semi-erect view of the chest   FINDINGS: The heart and mediastinum are normally visualized. The lungs are clear without pleural abnormality.       No acute cardiopulmonary disease.   Signed by: Radha Sandoval 1/22/2025 12:10 PM Dictation workstation:   [id number]    ECG 12 lead    Result Date: 1/22/2025  Normal sinus rhythm Possible Left atrial enlargement Nonspecific ST abnormality Abnormal ECG When compared with ECG of 21-JAN-2025 12:25, (unconfirmed) ST no longer depressed in Inferior leads    XR chest 1 view    Result Date: 1/21/2025  STUDY: Chest Radiograph;  01/21/2025, 3:18 PM. INDICATION: Tachypnea. COMPARISON: CT AP: 01/21/25 ACCESSION NUMBER(S): [id number] ORDERING CLINICIAN: RU BAIN TECHNIQUE:  Frontal chest was obtained at 15:18 hours. FINDINGS: CARDIOMEDIASTINAL SILHOUETTE: Cardiomediastinal silhouette is normal in size and configuration.  LUNGS: Lungs are clear.  ABDOMEN: No remarkable upper abdominal findings.  BONES: No acute osseous changes.    No acute disease. Signed by Ravindra Hood MD    CT abdomen pelvis w IV " contrast    Result Date: 1/21/2025  STUDY: CT Abdomen and Pelvis with IV Contrast; 1/21/2025 14:02 INDICATION: Abdominal pain, vomiting, hyperglycemia. COMPARISON: None Available. ACCESSION NUMBER(S): [id number] ORDERING CLINICIAN: RU BAIN TECHNIQUE: CT of the abdomen and pelvis was performed.  Contiguous axial images were obtained at 3 mm slice thickness through the abdomen and pelvis. Coronal and sagittal reconstructions at 3 mm slice thickness were performed.  Omnipaque 350 75 mL was administered intravenously.  FINDINGS: Coronary artery calcifications are present.  No acute findings are seen within the liver.  The portal and hepatic veins are patent. There is no intrahepatic ductal dilatation.  The gallbladder is mildly distended.  No acute findings are seen within the spleen.  The splenic vein is patent.  There is atrophy of the pancreas.  No inflammatory changes are noted.  No adrenal nodule is noted.  There is no evidence of hydronephrosis.  No dilated loops of small bowel or colon are visualized.  There is wall thickening seen throughout the colon, indicative of pancolitis.  The appendix is normal in appearance.  No enlarged lymph nodes are seen in the pelvic sidewalls, iliac chains, or retroperitoneum.  There is no evidence of aneurysmal dilatation of the abdominal aorta.  Diffuse plaque is seen throughout the abdominal aorta.  No compression fractures are visualized within the lumbar spine.  There is prominent distention of the urinary bladder.    1.  Pancolitis. 2.  Diffuse plaque throughout the abdominal aorta. 3.  Prominent distention of the urinary bladder. 4.  Coronary artery calcifications. Signed by Trevor Estrada MD    Scheduled medications  [START ON 1/23/2025] aspirin, 81 mg, oral, Daily  heparin, 5,000 Units, subcutaneous, q8h  lactated Ringer's, 1,000 mL, intravenous, Once  pantoprazole, 40 mg, intravenous, BID  piperacillin-tazobactam, 3.375 g, intravenous, q6h      Continuous  medications  dextrose 10 % in water (D10W), 150 mL/hr  dextrose 10 % in water (D10W), 150 mL/hr  dextrose 5%-0.45 % sodium chloride, 150 mL/hr  insulin regular, 5 Units/hr, Last Rate: 5 Units/hr (01/22/25 1226)  lactated Ringer's, 150 mL/hr      PRN medications  PRN medications: acetaminophen **OR** acetaminophen **OR** acetaminophen, dextrose 10 % in water (D10W), dextrose 10 % in water (D10W), dextrose 5%-0.45 % sodium chloride, dextrose, dextrose, dextrose, glucagon, glucagon, heparin (porcine), hydrALAZINE, melatonin, ondansetron ODT **OR** ondansetron  Results for orders placed or performed during the hospital encounter of 01/21/25 (from the past 24 hours)   BLOOD GAS VENOUS FULL PANEL   Result Value Ref Range    POCT pH, Venous 7.34 7.33 - 7.43 pH    POCT pCO2, Venous 30 (L) 41 - 51 mm Hg    POCT pO2, Venous 73 (H) 35 - 45 mm Hg    POCT SO2, Venous 96 (H) 45 - 75 %    POCT Oxy Hemoglobin, Venous 94.8 (H) 45.0 - 75.0 %    POCT Hematocrit Calculated, Venous 36.0 (L) 41.0 - 52.0 %    POCT Sodium, Venous 132 (L) 136 - 145 mmol/L    POCT Potassium, Venous 4.2 3.5 - 5.3 mmol/L    POCT Chloride, Venous 105 98 - 107 mmol/L    POCT Ionized Calicum, Venous 1.15 1.10 - 1.33 mmol/L    POCT Glucose, Venous 323 (H) 74 - 99 mg/dL    POCT Lactate, Venous 3.9 (H) 0.4 - 2.0 mmol/L    POCT Base Excess, Venous -8.4 (L) -2.0 - 3.0 mmol/L    POCT HCO3 Calculated, Venous 16.2 (L) 22.0 - 26.0 mmol/L    POCT Hemoglobin, Venous 12.1 (L) 13.5 - 17.5 g/dL    POCT Anion Gap, Venous 15.0 10.0 - 25.0 mmol/L    Patient Temperature 37.0 degrees Celsius    FiO2 21 %   Blood Gas Lactic Acid, Venous   Result Value Ref Range    POCT Lactate, Venous 4.1 (HH) 0.4 - 2.0 mmol/L   B-type natriuretic peptide   Result Value Ref Range     (H) 0 - 99 pg/mL   Troponin, High Sensitivity, 1 Hour   Result Value Ref Range    Troponin I, High Sensitivity 16 0 - 20 ng/L   Urinalysis with Reflex Culture and Microscopic   Result Value Ref Range    Color,  Urine Colorless (N) Light-Yellow, Yellow, Dark-Yellow    Appearance, Urine Clear Clear    Specific Gravity, Urine 1.023 1.005 - 1.035    pH, Urine 5.5 5.0, 5.5, 6.0, 6.5, 7.0, 7.5, 8.0    Protein, Urine NEGATIVE NEGATIVE, 10 (TRACE), 20 (TRACE) mg/dL    Glucose, Urine OVER (4+) (A) Normal mg/dL    Blood, Urine NEGATIVE NEGATIVE    Ketones, Urine 40 (2+) (A) NEGATIVE mg/dL    Bilirubin, Urine NEGATIVE NEGATIVE    Urobilinogen, Urine Normal Normal mg/dL    Nitrite, Urine NEGATIVE NEGATIVE    Leukocyte Esterase, Urine NEGATIVE NEGATIVE   Blood Gas Lactic Acid, Venous   Result Value Ref Range    POCT Lactate, Venous 4.3 (HH) 0.4 - 2.0 mmol/L   POCT GLUCOSE   Result Value Ref Range    POCT Glucose 297 (H) 74 - 99 mg/dL   Blood Gas Lactic Acid, Venous   Result Value Ref Range    POCT Lactate, Venous 5.1 (HH) 0.4 - 2.0 mmol/L   POCT GLUCOSE   Result Value Ref Range    POCT Glucose 283 (H) 74 - 99 mg/dL   POCT GLUCOSE   Result Value Ref Range    POCT Glucose 295 (H) 74 - 99 mg/dL   Blood Gas Arterial Full Panel   Result Value Ref Range    POCT pH, Arterial 7.48 (H) 7.38 - 7.42 pH    POCT pCO2, Arterial 25 (L) 38 - 42 mm Hg    POCT pO2, Arterial 102 (H) 85 - 95 mm Hg    POCT SO2, Arterial 99 94 - 100 %    POCT Oxy Hemoglobin, Arterial 96.8 94.0 - 98.0 %    POCT Hematocrit Calculated, Arterial 36.0 (L) 41.0 - 52.0 %    POCT Sodium, Arterial 130 (L) 136 - 145 mmol/L    POCT Potassium, Arterial 4.9 3.5 - 5.3 mmol/L    POCT Chloride, Arterial 101 98 - 107 mmol/L    POCT Ionized Calcium, Arterial 1.19 1.10 - 1.33 mmol/L    POCT Glucose, Arterial 362 (H) 74 - 99 mg/dL    POCT Lactate, Arterial 3.3 (H) 0.4 - 2.0 mmol/L    POCT Base Excess, Arterial -3.5 (L) -2.0 - 3.0 mmol/L    POCT HCO3 Calculated, Arterial 18.6 (L) 22.0 - 26.0 mmol/L    POCT Hemoglobin, Arterial 12.1 (L) 13.5 - 17.5 g/dL    POCT Anion Gap, Arterial 15 10 - 25 mmo/L    Patient Temperature 37.0 degrees Celsius    FiO2 21 %    Site of Arterial Puncture Brachial  Right     Rafal's Test     Blood Culture    Specimen: Peripheral Venipuncture; Blood culture   Result Value Ref Range    Blood Culture Loaded on Instrument - Culture in progress    Blood Culture    Specimen: Peripheral Venipuncture; Blood culture   Result Value Ref Range    Blood Culture Loaded on Instrument - Culture in progress    Basic metabolic panel   Result Value Ref Range    Glucose 336 (H) 74 - 99 mg/dL    Sodium 133 (L) 136 - 145 mmol/L    Potassium 4.5 3.5 - 5.3 mmol/L    Chloride 100 98 - 107 mmol/L    Bicarbonate 16 (L) 21 - 32 mmol/L    Anion Gap 22 (H) 10 - 20 mmol/L    Urea Nitrogen 23 6 - 23 mg/dL    Creatinine 1.13 0.50 - 1.30 mg/dL    eGFR 72 >60 mL/min/1.73m*2    Calcium 9.0 8.6 - 10.3 mg/dL   Troponin I, High Sensitivity   Result Value Ref Range    Troponin I, High Sensitivity 59 (HH) 0 - 20 ng/L   Hemoglobin A1C   Result Value Ref Range    Hemoglobin A1C 10.2 (H) See comment %    Estimated Average Glucose 246 Not Established mg/dL   Lactate   Result Value Ref Range    Lactate 3.6 (H) 0.4 - 2.0 mmol/L   POCT GLUCOSE   Result Value Ref Range    POCT Glucose 319 (H) 74 - 99 mg/dL   POCT GLUCOSE   Result Value Ref Range    POCT Glucose 335 (H) 74 - 99 mg/dL   Basic metabolic panel   Result Value Ref Range    Glucose 352 (H) 74 - 99 mg/dL    Sodium 130 (L) 136 - 145 mmol/L    Potassium 4.8 3.5 - 5.3 mmol/L    Chloride 97 (L) 98 - 107 mmol/L    Bicarbonate 15 (L) 21 - 32 mmol/L    Anion Gap 23 (H) 10 - 20 mmol/L    Urea Nitrogen 25 (H) 6 - 23 mg/dL    Creatinine 1.22 0.50 - 1.30 mg/dL    eGFR 65 >60 mL/min/1.73m*2    Calcium 8.7 8.6 - 10.3 mg/dL   Lactate   Result Value Ref Range    Lactate 2.0 0.4 - 2.0 mmol/L   Troponin I, High Sensitivity   Result Value Ref Range    Troponin I, High Sensitivity 122 (HH) 0 - 20 ng/L   POCT GLUCOSE   Result Value Ref Range    POCT Glucose 255 (H) 74 - 99 mg/dL   Basic metabolic panel   Result Value Ref Range    Glucose 255 (H) 74 - 99 mg/dL    Sodium 133 (L) 136 -  145 mmol/L    Potassium 4.0 3.5 - 5.3 mmol/L    Chloride 102 98 - 107 mmol/L    Bicarbonate 18 (L) 21 - 32 mmol/L    Anion Gap 17 10 - 20 mmol/L    Urea Nitrogen 27 (H) 6 - 23 mg/dL    Creatinine 1.28 0.50 - 1.30 mg/dL    eGFR 62 >60 mL/min/1.73m*2    Calcium 8.5 (L) 8.6 - 10.3 mg/dL   CBC   Result Value Ref Range    WBC 10.3 4.4 - 11.3 x10*3/uL    nRBC 0.0 0.0 - 0.0 /100 WBCs    RBC 3.97 (L) 4.50 - 5.90 x10*6/uL    Hemoglobin 11.5 (L) 13.5 - 17.5 g/dL    Hematocrit 33.6 (L) 41.0 - 52.0 %    MCV 85 80 - 100 fL    MCH 29.0 26.0 - 34.0 pg    MCHC 34.2 32.0 - 36.0 g/dL    RDW 12.8 11.5 - 14.5 %    Platelets 177 150 - 450 x10*3/uL   Troponin I, High Sensitivity   Result Value Ref Range    Troponin I, High Sensitivity 332 (HH) 0 - 20 ng/L   ECG 12 lead   Result Value Ref Range    Ventricular Rate 92 BPM    Atrial Rate 92 BPM    LA Interval 142 ms    QRS Duration 86 ms    QT Interval 386 ms    QTC Calculation(Bazett) 477 ms    P Axis 84 degrees    R Axis 81 degrees    T Axis 67 degrees    QRS Count 15 beats    Q Onset 222 ms    P Onset 151 ms    P Offset 200 ms    T Offset 415 ms    QTC Fredericia 445 ms   aPTT   Result Value Ref Range    aPTT 24.9 22.0 - 32.5 seconds   CBC   Result Value Ref Range    WBC 10.7 4.4 - 11.3 x10*3/uL    nRBC 0.0 0.0 - 0.0 /100 WBCs    RBC 4.07 (L) 4.50 - 5.90 x10*6/uL    Hemoglobin 11.7 (L) 13.5 - 17.5 g/dL    Hematocrit 33.9 (L) 41.0 - 52.0 %    MCV 83 80 - 100 fL    MCH 28.7 26.0 - 34.0 pg    MCHC 34.5 32.0 - 36.0 g/dL    RDW 13.0 11.5 - 14.5 %    Platelets 190 150 - 450 x10*3/uL   Basic metabolic panel   Result Value Ref Range    Glucose 298 (H) 74 - 99 mg/dL    Sodium 132 (L) 136 - 145 mmol/L    Potassium 4.3 3.5 - 5.3 mmol/L    Chloride 101 98 - 107 mmol/L    Bicarbonate 14 (L) 21 - 32 mmol/L    Anion Gap 21 (H) 10 - 20 mmol/L    Urea Nitrogen 28 (H) 6 - 23 mg/dL    Creatinine 1.23 0.50 - 1.30 mg/dL    eGFR 65 >60 mL/min/1.73m*2    Calcium 8.8 8.6 - 10.3 mg/dL   POCT GLUCOSE   Result  Value Ref Range    POCT Glucose 313 (H) 74 - 99 mg/dL   Transthoracic Echo (TTE) Complete   Result Value Ref Range    BSA 1.71 m2   POCT GLUCOSE   Result Value Ref Range    POCT Glucose 238 (H) 74 - 99 mg/dL        Assessment/Plan   Abdominal pain/Abnormal CT  Patient presented with abdominal cramping, nausea vomiting.  Was found to be in borderline DKA with dehydration. Patient reports after receiving IV fluid with improved control of blood sugar is feeling overall better. Due to CT findings of pancolitis, recommend colonoscopy at some point to further evaluate. Defer timing to attending. Will order inflammatory markers.  Stool studies have been ordered.  At this time we will continue antibiotics as ordered. Will discuss further recommendation with Dr. Ham.     Mary Frazier, APRN-CNP

## 2025-01-23 ENCOUNTER — APPOINTMENT (OUTPATIENT)
Dept: RADIOLOGY | Facility: HOSPITAL | Age: 65
End: 2025-01-23
Payer: COMMERCIAL

## 2025-01-23 LAB
ALBUMIN SERPL BCP-MCNC: 3.8 G/DL (ref 3.4–5)
ALP SERPL-CCNC: 57 U/L (ref 33–136)
ALT SERPL W P-5'-P-CCNC: 22 U/L (ref 10–52)
ANION GAP SERPL CALCULATED.3IONS-SCNC: 11 MMOL/L (ref 10–20)
ANION GAP SERPL CALCULATED.3IONS-SCNC: 12 MMOL/L (ref 10–20)
ANION GAP SERPL CALCULATED.3IONS-SCNC: 13 MMOL/L (ref 10–20)
ANION GAP SERPL CALCULATED.3IONS-SCNC: 14 MMOL/L (ref 10–20)
AST SERPL W P-5'-P-CCNC: 30 U/L (ref 9–39)
BASOPHILS # BLD AUTO: 0.02 X10*3/UL (ref 0–0.1)
BASOPHILS NFR BLD AUTO: 0.2 %
BILIRUB SERPL-MCNC: 0.9 MG/DL (ref 0–1.2)
BUN SERPL-MCNC: 20 MG/DL (ref 6–23)
BUN SERPL-MCNC: 21 MG/DL (ref 6–23)
BUN SERPL-MCNC: 22 MG/DL (ref 6–23)
BUN SERPL-MCNC: 22 MG/DL (ref 6–23)
BUN SERPL-MCNC: 23 MG/DL (ref 6–23)
BUN SERPL-MCNC: 24 MG/DL (ref 6–23)
CALCIUM SERPL-MCNC: 8.3 MG/DL (ref 8.6–10.3)
CALCIUM SERPL-MCNC: 8.3 MG/DL (ref 8.6–10.3)
CALCIUM SERPL-MCNC: 8.4 MG/DL (ref 8.6–10.3)
CALCIUM SERPL-MCNC: 8.5 MG/DL (ref 8.6–10.3)
CARDIAC TROPONIN I PNL SERPL HS: 321 NG/L (ref 0–20)
CHLORIDE SERPL-SCNC: 100 MMOL/L (ref 98–107)
CHLORIDE SERPL-SCNC: 100 MMOL/L (ref 98–107)
CHLORIDE SERPL-SCNC: 101 MMOL/L (ref 98–107)
CHLORIDE SERPL-SCNC: 102 MMOL/L (ref 98–107)
CHLORIDE SERPL-SCNC: 103 MMOL/L (ref 98–107)
CHLORIDE SERPL-SCNC: 103 MMOL/L (ref 98–107)
CO2 SERPL-SCNC: 20 MMOL/L (ref 21–32)
CO2 SERPL-SCNC: 21 MMOL/L (ref 21–32)
CO2 SERPL-SCNC: 22 MMOL/L (ref 21–32)
CO2 SERPL-SCNC: 23 MMOL/L (ref 21–32)
CREAT SERPL-MCNC: 1.05 MG/DL (ref 0.5–1.3)
CREAT SERPL-MCNC: 1.05 MG/DL (ref 0.5–1.3)
CREAT SERPL-MCNC: 1.08 MG/DL (ref 0.5–1.3)
CREAT SERPL-MCNC: 1.1 MG/DL (ref 0.5–1.3)
EGFRCR SERPLBLD CKD-EPI 2021: 74 ML/MIN/1.73M*2
EGFRCR SERPLBLD CKD-EPI 2021: 76 ML/MIN/1.73M*2
EGFRCR SERPLBLD CKD-EPI 2021: 78 ML/MIN/1.73M*2
EGFRCR SERPLBLD CKD-EPI 2021: 78 ML/MIN/1.73M*2
EOSINOPHIL # BLD AUTO: 0 X10*3/UL (ref 0–0.7)
EOSINOPHIL NFR BLD AUTO: 0 %
ERYTHROCYTE [DISTWIDTH] IN BLOOD BY AUTOMATED COUNT: 12.8 % (ref 11.5–14.5)
GLUCOSE BLD MANUAL STRIP-MCNC: 122 MG/DL (ref 74–99)
GLUCOSE BLD MANUAL STRIP-MCNC: 152 MG/DL (ref 74–99)
GLUCOSE BLD MANUAL STRIP-MCNC: 157 MG/DL (ref 74–99)
GLUCOSE BLD MANUAL STRIP-MCNC: 192 MG/DL (ref 74–99)
GLUCOSE BLD MANUAL STRIP-MCNC: 210 MG/DL (ref 74–99)
GLUCOSE BLD MANUAL STRIP-MCNC: 215 MG/DL (ref 74–99)
GLUCOSE BLD MANUAL STRIP-MCNC: 230 MG/DL (ref 74–99)
GLUCOSE BLD MANUAL STRIP-MCNC: 235 MG/DL (ref 74–99)
GLUCOSE BLD MANUAL STRIP-MCNC: 255 MG/DL (ref 74–99)
GLUCOSE BLD MANUAL STRIP-MCNC: 257 MG/DL (ref 74–99)
GLUCOSE BLD MANUAL STRIP-MCNC: 298 MG/DL (ref 74–99)
GLUCOSE BLD MANUAL STRIP-MCNC: 66 MG/DL (ref 74–99)
GLUCOSE BLD MANUAL STRIP-MCNC: 68 MG/DL (ref 74–99)
GLUCOSE BLD MANUAL STRIP-MCNC: 82 MG/DL (ref 74–99)
GLUCOSE SERPL-MCNC: 101 MG/DL (ref 74–99)
GLUCOSE SERPL-MCNC: 147 MG/DL (ref 74–99)
GLUCOSE SERPL-MCNC: 153 MG/DL (ref 74–99)
GLUCOSE SERPL-MCNC: 216 MG/DL (ref 74–99)
GLUCOSE SERPL-MCNC: 216 MG/DL (ref 74–99)
GLUCOSE SERPL-MCNC: 259 MG/DL (ref 74–99)
HCT VFR BLD AUTO: 33.4 % (ref 41–52)
HGB BLD-MCNC: 11.7 G/DL (ref 13.5–17.5)
IMM GRANULOCYTES # BLD AUTO: 0.07 X10*3/UL (ref 0–0.7)
IMM GRANULOCYTES NFR BLD AUTO: 0.6 % (ref 0–0.9)
LYMPHOCYTES # BLD AUTO: 0.99 X10*3/UL (ref 1.2–4.8)
LYMPHOCYTES NFR BLD AUTO: 8.8 %
MCH RBC QN AUTO: 28.8 PG (ref 26–34)
MCHC RBC AUTO-ENTMCNC: 35 G/DL (ref 32–36)
MCV RBC AUTO: 82 FL (ref 80–100)
MONOCYTES # BLD AUTO: 0.98 X10*3/UL (ref 0.1–1)
MONOCYTES NFR BLD AUTO: 8.8 %
NEUTROPHILS # BLD AUTO: 9.13 X10*3/UL (ref 1.2–7.7)
NEUTROPHILS NFR BLD AUTO: 81.6 %
NRBC BLD-RTO: 0 /100 WBCS (ref 0–0)
PLATELET # BLD AUTO: 185 X10*3/UL (ref 150–450)
POTASSIUM SERPL-SCNC: 3.3 MMOL/L (ref 3.5–5.3)
POTASSIUM SERPL-SCNC: 3.5 MMOL/L (ref 3.5–5.3)
POTASSIUM SERPL-SCNC: 3.5 MMOL/L (ref 3.5–5.3)
POTASSIUM SERPL-SCNC: 3.6 MMOL/L (ref 3.5–5.3)
POTASSIUM SERPL-SCNC: 3.6 MMOL/L (ref 3.5–5.3)
POTASSIUM SERPL-SCNC: 3.8 MMOL/L (ref 3.5–5.3)
PROT SERPL-MCNC: 6 G/DL (ref 6.4–8.2)
RBC # BLD AUTO: 4.06 X10*6/UL (ref 4.5–5.9)
SODIUM SERPL-SCNC: 131 MMOL/L (ref 136–145)
SODIUM SERPL-SCNC: 131 MMOL/L (ref 136–145)
SODIUM SERPL-SCNC: 132 MMOL/L (ref 136–145)
SODIUM SERPL-SCNC: 132 MMOL/L (ref 136–145)
SODIUM SERPL-SCNC: 133 MMOL/L (ref 136–145)
SODIUM SERPL-SCNC: 133 MMOL/L (ref 136–145)
WBC # BLD AUTO: 11.2 X10*3/UL (ref 4.4–11.3)

## 2025-01-23 PROCEDURE — 2060000001 HC INTERMEDIATE ICU ROOM DAILY

## 2025-01-23 PROCEDURE — 70450 CT HEAD/BRAIN W/O DYE: CPT

## 2025-01-23 PROCEDURE — 99253 IP/OBS CNSLTJ NEW/EST LOW 45: CPT | Performed by: SURGERY

## 2025-01-23 PROCEDURE — 82374 ASSAY BLOOD CARBON DIOXIDE: CPT | Performed by: INTERNAL MEDICINE

## 2025-01-23 PROCEDURE — 2500000001 HC RX 250 WO HCPCS SELF ADMINISTERED DRUGS (ALT 637 FOR MEDICARE OP): Performed by: INTERNAL MEDICINE

## 2025-01-23 PROCEDURE — 74177 CT ABD & PELVIS W/CONTRAST: CPT | Performed by: RADIOLOGY

## 2025-01-23 PROCEDURE — 2550000001 HC RX 255 CONTRASTS: Performed by: INTERNAL MEDICINE

## 2025-01-23 PROCEDURE — 2500000002 HC RX 250 W HCPCS SELF ADMINISTERED DRUGS (ALT 637 FOR MEDICARE OP, ALT 636 FOR OP/ED): Performed by: INTERNAL MEDICINE

## 2025-01-23 PROCEDURE — 99233 SBSQ HOSP IP/OBS HIGH 50: CPT | Performed by: INTERNAL MEDICINE

## 2025-01-23 PROCEDURE — 84484 ASSAY OF TROPONIN QUANT: CPT | Performed by: INTERNAL MEDICINE

## 2025-01-23 PROCEDURE — 82947 ASSAY GLUCOSE BLOOD QUANT: CPT

## 2025-01-23 PROCEDURE — 2500000004 HC RX 250 GENERAL PHARMACY W/ HCPCS (ALT 636 FOR OP/ED): Performed by: STUDENT IN AN ORGANIZED HEALTH CARE EDUCATION/TRAINING PROGRAM

## 2025-01-23 PROCEDURE — 85025 COMPLETE CBC W/AUTO DIFF WBC: CPT | Performed by: INTERNAL MEDICINE

## 2025-01-23 PROCEDURE — 2500000004 HC RX 250 GENERAL PHARMACY W/ HCPCS (ALT 636 FOR OP/ED)

## 2025-01-23 PROCEDURE — 51701 INSERT BLADDER CATHETER: CPT

## 2025-01-23 PROCEDURE — 80048 BASIC METABOLIC PNL TOTAL CA: CPT | Mod: CCI | Performed by: INTERNAL MEDICINE

## 2025-01-23 PROCEDURE — 84075 ASSAY ALKALINE PHOSPHATASE: CPT | Performed by: INTERNAL MEDICINE

## 2025-01-23 PROCEDURE — 2500000004 HC RX 250 GENERAL PHARMACY W/ HCPCS (ALT 636 FOR OP/ED): Performed by: INTERNAL MEDICINE

## 2025-01-23 PROCEDURE — 74177 CT ABD & PELVIS W/CONTRAST: CPT

## 2025-01-23 PROCEDURE — 36415 COLL VENOUS BLD VENIPUNCTURE: CPT | Performed by: INTERNAL MEDICINE

## 2025-01-23 PROCEDURE — 70450 CT HEAD/BRAIN W/O DYE: CPT | Performed by: RADIOLOGY

## 2025-01-23 RX ORDER — TAMSULOSIN HYDROCHLORIDE 0.4 MG/1
0.4 CAPSULE ORAL
Status: DISCONTINUED | OUTPATIENT
Start: 2025-01-24 | End: 2025-02-03

## 2025-01-23 RX ORDER — DIPHENHYDRAMINE HYDROCHLORIDE 50 MG/ML
25 INJECTION INTRAMUSCULAR; INTRAVENOUS ONCE
Status: COMPLETED | OUTPATIENT
Start: 2025-01-23 | End: 2025-01-23

## 2025-01-23 RX ORDER — POTASSIUM CHLORIDE 14.9 MG/ML
20 INJECTION INTRAVENOUS
Status: COMPLETED | OUTPATIENT
Start: 2025-01-23 | End: 2025-01-23

## 2025-01-23 RX ORDER — INSULIN LISPRO 100 [IU]/ML
0-15 INJECTION, SOLUTION INTRAVENOUS; SUBCUTANEOUS
Status: DISCONTINUED | OUTPATIENT
Start: 2025-01-23 | End: 2025-01-28

## 2025-01-23 RX ORDER — INSULIN GLARGINE 100 [IU]/ML
15 INJECTION, SOLUTION SUBCUTANEOUS EVERY 24 HOURS
Status: DISCONTINUED | OUTPATIENT
Start: 2025-01-23 | End: 2025-01-26

## 2025-01-23 RX ORDER — METOCLOPRAMIDE HYDROCHLORIDE 5 MG/ML
10 INJECTION INTRAMUSCULAR; INTRAVENOUS EVERY 6 HOURS SCHEDULED
Status: DISCONTINUED | OUTPATIENT
Start: 2025-01-23 | End: 2025-02-02

## 2025-01-23 RX ORDER — POLYETHYLENE GLYCOL 3350 17 G/17G
17 POWDER, FOR SOLUTION ORAL 3 TIMES DAILY
Status: DISCONTINUED | OUTPATIENT
Start: 2025-01-23 | End: 2025-02-01

## 2025-01-23 RX ORDER — METOPROLOL TARTRATE 25 MG/1
25 TABLET, FILM COATED ORAL 2 TIMES DAILY
Status: DISCONTINUED | OUTPATIENT
Start: 2025-01-23 | End: 2025-01-25

## 2025-01-23 RX ADMIN — IOHEXOL 75 ML: 350 INJECTION, SOLUTION INTRAVENOUS at 14:54

## 2025-01-23 RX ADMIN — METOCLOPRAMIDE HYDROCHLORIDE 10 MG: 5 INJECTION INTRAMUSCULAR; INTRAVENOUS at 17:14

## 2025-01-23 RX ADMIN — HYDRALAZINE HYDROCHLORIDE 5 MG: 20 INJECTION INTRAMUSCULAR; INTRAVENOUS at 01:08

## 2025-01-23 RX ADMIN — METOCLOPRAMIDE HYDROCHLORIDE 10 MG: 5 INJECTION INTRAMUSCULAR; INTRAVENOUS at 11:24

## 2025-01-23 RX ADMIN — POLYETHYLENE GLYCOL 3350 17 G: 17 POWDER, FOR SOLUTION ORAL at 15:40

## 2025-01-23 RX ADMIN — PIPERACILLIN SODIUM AND TAZOBACTAM SODIUM 3.38 G: 3; .375 INJECTION, SOLUTION INTRAVENOUS at 19:05

## 2025-01-23 RX ADMIN — METOPROLOL TARTRATE 25 MG: 25 TABLET, FILM COATED ORAL at 08:34

## 2025-01-23 RX ADMIN — HYDRALAZINE HYDROCHLORIDE 5 MG: 20 INJECTION INTRAMUSCULAR; INTRAVENOUS at 19:05

## 2025-01-23 RX ADMIN — PIPERACILLIN SODIUM AND TAZOBACTAM SODIUM 3.38 G: 3; .375 INJECTION, SOLUTION INTRAVENOUS at 07:33

## 2025-01-23 RX ADMIN — PROMETHAZINE HYDROCHLORIDE 12.5 MG: 25 INJECTION INTRAMUSCULAR; INTRAVENOUS at 05:46

## 2025-01-23 RX ADMIN — PANTOPRAZOLE SODIUM 40 MG: 40 INJECTION, POWDER, FOR SOLUTION INTRAVENOUS at 20:27

## 2025-01-23 RX ADMIN — INSULIN LISPRO 6 UNITS: 100 INJECTION, SOLUTION INTRAVENOUS; SUBCUTANEOUS at 11:24

## 2025-01-23 RX ADMIN — PANTOPRAZOLE SODIUM 40 MG: 40 INJECTION, POWDER, FOR SOLUTION INTRAVENOUS at 08:25

## 2025-01-23 RX ADMIN — HYDRALAZINE HYDROCHLORIDE 5 MG: 20 INJECTION INTRAMUSCULAR; INTRAVENOUS at 05:06

## 2025-01-23 RX ADMIN — DIPHENHYDRAMINE HYDROCHLORIDE 25 MG: 50 INJECTION, SOLUTION INTRAMUSCULAR; INTRAVENOUS at 04:44

## 2025-01-23 RX ADMIN — METOCLOPRAMIDE HYDROCHLORIDE 10 MG: 5 INJECTION INTRAMUSCULAR; INTRAVENOUS at 23:16

## 2025-01-23 RX ADMIN — INSULIN GLARGINE 15 UNITS: 100 INJECTION, SOLUTION SUBCUTANEOUS at 11:24

## 2025-01-23 RX ADMIN — POTASSIUM CHLORIDE 20 MEQ: 14.9 INJECTION, SOLUTION INTRAVENOUS at 13:06

## 2025-01-23 RX ADMIN — ONDANSETRON 4 MG: 2 INJECTION INTRAMUSCULAR; INTRAVENOUS at 04:11

## 2025-01-23 RX ADMIN — HYDRALAZINE HYDROCHLORIDE 5 MG: 20 INJECTION INTRAMUSCULAR; INTRAVENOUS at 16:02

## 2025-01-23 RX ADMIN — POLYETHYLENE GLYCOL 3350 17 G: 17 POWDER, FOR SOLUTION ORAL at 20:27

## 2025-01-23 RX ADMIN — HEPARIN SODIUM 5000 UNITS: 5000 INJECTION, SOLUTION INTRAVENOUS; SUBCUTANEOUS at 11:24

## 2025-01-23 RX ADMIN — HEPARIN SODIUM 5000 UNITS: 5000 INJECTION, SOLUTION INTRAVENOUS; SUBCUTANEOUS at 19:05

## 2025-01-23 RX ADMIN — POTASSIUM CHLORIDE 20 MEQ: 14.9 INJECTION, SOLUTION INTRAVENOUS at 02:00

## 2025-01-23 RX ADMIN — DEXTROSE MONOHYDRATE AND SODIUM CHLORIDE 150 ML/HR: 5; .45 INJECTION, SOLUTION INTRAVENOUS at 05:25

## 2025-01-23 RX ADMIN — POTASSIUM CHLORIDE 20 MEQ: 14.9 INJECTION, SOLUTION INTRAVENOUS at 15:40

## 2025-01-23 RX ADMIN — PIPERACILLIN SODIUM AND TAZOBACTAM SODIUM 3.38 G: 3; .375 INJECTION, SOLUTION INTRAVENOUS at 13:06

## 2025-01-23 RX ADMIN — METOPROLOL TARTRATE 25 MG: 25 TABLET, FILM COATED ORAL at 20:27

## 2025-01-23 RX ADMIN — PIPERACILLIN SODIUM AND TAZOBACTAM SODIUM 3.38 G: 3; .375 INJECTION, SOLUTION INTRAVENOUS at 02:02

## 2025-01-23 RX ADMIN — HEPARIN SODIUM 5000 UNITS: 5000 INJECTION, SOLUTION INTRAVENOUS; SUBCUTANEOUS at 03:48

## 2025-01-23 RX ADMIN — ASPIRIN 81 MG CHEWABLE TABLET 81 MG: 81 TABLET CHEWABLE at 08:25

## 2025-01-23 ASSESSMENT — PAIN - FUNCTIONAL ASSESSMENT
PAIN_FUNCTIONAL_ASSESSMENT: 0-10

## 2025-01-23 ASSESSMENT — ENCOUNTER SYMPTOMS
NAUSEA: 1
HEMATURIA: 0
CARDIOVASCULAR NEGATIVE: 1
DYSURIA: 0
FEVER: 0
FLANK PAIN: 0
RESPIRATORY NEGATIVE: 1
CONSTIPATION: 1
ABDOMINAL PAIN: 1

## 2025-01-23 ASSESSMENT — PAIN SCALES - GENERAL
PAINLEVEL_OUTOF10: 0 - NO PAIN

## 2025-01-23 NOTE — PROGRESS NOTES
"Physical Therapy                 Therapy Communication Note    Patient Name: Devin Gomez  MRN: [id number]  Department: [facility] ICU  Room: 407/407-A  Today's Date: 1/23/2025     Discipline: Physical Therapy    PT Missed Visit: Yes     Missed Visit Reason: Missed Visit Reason: Patient refused    Missed Time: Cancel    Comment: Pt supine in bed upon arrival. Continues to c/o nausea. \"I'm too nauseous to do anything right now.\" Pt declining PT's reattempt later this date, \"Not today.\" Encouragement provided and educated on OOB mobility.   "

## 2025-01-23 NOTE — PROGRESS NOTES
"Devin Gomez is a 64 y.o. male on day 2 of admission presenting with Dehydration.    Subjective   Patient reports still feeling nauseous, mainly dry heaving.  No vomiting reported.       Objective     Physical Exam  HENT:      Head: Normocephalic.      Nose: Nose normal.      Mouth/Throat:      Mouth: Mucous membranes are moist.   Eyes:      Pupils: Pupils are equal, round, and reactive to light.   Cardiovascular:      Rate and Rhythm: Regular rhythm.   Pulmonary:      Breath sounds: Normal breath sounds.   Abdominal:      Palpations: Abdomen is soft.   Musculoskeletal:      Cervical back: Normal range of motion.   Skin:     General: Skin is warm.   Neurological:      General: No focal deficit present.      Mental Status: He is alert.         Last Recorded Vitals  Blood pressure 151/84, pulse 95, temperature 36.2 °C (97.2 °F), temperature source Temporal, resp. rate 14, height 1.753 m (5' 9\"), weight 61 kg (134 lb 7.7 oz), SpO2 97%.  Intake/Output last 3 Shifts:  I/O last 3 completed shifts:  In: 5186 (84.8 mL/kg) [I.V.:3052.1 (49.9 mL/kg); IV Piggyback:2133.9]  Out: 3120 (51 mL/kg) [Urine:3120 (1.4 mL/kg/hr)]  Weight: 61.1 kg     Relevant Results      Scheduled medications  aspirin, 81 mg, oral, Daily  heparin, 5,000 Units, subcutaneous, q8h  metoclopramide, 10 mg, intravenous, q6h EUFEMIA  metoprolol tartrate, 25 mg, oral, BID  pantoprazole, 40 mg, intravenous, BID  piperacillin-tazobactam, 3.375 g, intravenous, q6h      Continuous medications  dextrose 10 % in water (D10W), 150 mL/hr, Last Rate: Stopped (01/23/25 0100)  dextrose 10 % in water (D10W), 150 mL/hr  dextrose 5%-0.45 % sodium chloride, 150 mL/hr, Last Rate: Stopped (01/23/25 0830)  insulin regular, 1 Units/hr, Last Rate: 2 Units/hr (01/23/25 0916)  lactated Ringer's, 150 mL/hr, Last Rate: Stopped (01/22/25 1335)      PRN medications  PRN medications: acetaminophen **OR** acetaminophen **OR** acetaminophen, dextrose 10 % in water (D10W), dextrose 10 % in " water (D10W), dextrose 5%-0.45 % sodium chloride, dextrose, dextrose, dextrose, glucagon, glucagon, heparin (porcine), hydrALAZINE, melatonin, ondansetron ODT **OR** ondansetron  Results for orders placed or performed during the hospital encounter of 01/21/25 (from the past 24 hours)   Transthoracic Echo (TTE) Complete   Result Value Ref Range    AV pk emily 2.49 m/s    LVOT diam 2.10 cm    AV mn grad 16 mmHg    MV E/A ratio 0.65     Tricuspid annular plane systolic excursion 1.9 cm    LV Biplane EF 57 %    LV EF 63 %    RV free wall pk S' 15.70 cm/s    LVIDd 4.07 cm    AV pk grad 25 mmHg    Aortic Valve Area by Continuity of VTI 1.30 cm2    Aortic Valve Area by Continuity of Peak Velocity 1.39 cm2    LV A4C EF 66.6    POCT GLUCOSE   Result Value Ref Range    POCT Glucose 238 (H) 74 - 99 mg/dL   Basic metabolic panel   Result Value Ref Range    Glucose 236 (H) 74 - 99 mg/dL    Sodium 131 (L) 136 - 145 mmol/L    Potassium 3.7 3.5 - 5.3 mmol/L    Chloride 98 98 - 107 mmol/L    Bicarbonate 17 (L) 21 - 32 mmol/L    Anion Gap 20 10 - 20 mmol/L    Urea Nitrogen 29 (H) 6 - 23 mg/dL    Creatinine 1.24 0.50 - 1.30 mg/dL    eGFR 64 >60 mL/min/1.73m*2    Calcium 8.7 8.6 - 10.3 mg/dL   Magnesium   Result Value Ref Range    Magnesium 1.60 1.60 - 2.40 mg/dL   B-Type Natriuretic Peptide   Result Value Ref Range     (H) 0 - 99 pg/mL   Troponin I, High Sensitivity, Initial   Result Value Ref Range    Troponin I, High Sensitivity 558 (HH) 0 - 20 ng/L   C-reactive protein   Result Value Ref Range    C-Reactive Protein 0.58 <1.00 mg/dL   POCT GLUCOSE   Result Value Ref Range    POCT Glucose 176 (H) 74 - 99 mg/dL   Phosphorus   Result Value Ref Range    Phosphorus 2.3 (L) 2.5 - 4.9 mg/dL   Troponin, High Sensitivity, 1 Hour   Result Value Ref Range    Troponin I, High Sensitivity 594 (HH) 0 - 20 ng/L   POCT GLUCOSE   Result Value Ref Range    POCT Glucose 148 (H) 74 - 99 mg/dL   Basic metabolic panel   Result Value Ref Range     Glucose 145 (H) 74 - 99 mg/dL    Sodium 134 (L) 136 - 145 mmol/L    Potassium 3.9 3.5 - 5.3 mmol/L    Chloride 102 98 - 107 mmol/L    Bicarbonate 21 21 - 32 mmol/L    Anion Gap 15 10 - 20 mmol/L    Urea Nitrogen 29 (H) 6 - 23 mg/dL    Creatinine 1.22 0.50 - 1.30 mg/dL    eGFR 65 >60 mL/min/1.73m*2    Calcium 8.8 8.6 - 10.3 mg/dL   Lactate   Result Value Ref Range    Lactate 2.0 0.4 - 2.0 mmol/L   POCT GLUCOSE   Result Value Ref Range    POCT Glucose 151 (H) 74 - 99 mg/dL   POCT GLUCOSE   Result Value Ref Range    POCT Glucose 124 (H) 74 - 99 mg/dL   POCT GLUCOSE   Result Value Ref Range    POCT Glucose 116 (H) 74 - 99 mg/dL   POCT GLUCOSE   Result Value Ref Range    POCT Glucose 168 (H) 74 - 99 mg/dL   Basic metabolic panel   Result Value Ref Range    Glucose 179 (H) 74 - 99 mg/dL    Sodium 132 (L) 136 - 145 mmol/L    Potassium 3.5 3.5 - 5.3 mmol/L    Chloride 101 98 - 107 mmol/L    Bicarbonate 21 21 - 32 mmol/L    Anion Gap 14 10 - 20 mmol/L    Urea Nitrogen 26 (H) 6 - 23 mg/dL    Creatinine 1.17 0.50 - 1.30 mg/dL    eGFR 69 >60 mL/min/1.73m*2    Calcium 8.7 8.6 - 10.3 mg/dL   POCT GLUCOSE   Result Value Ref Range    POCT Glucose 152 (H) 74 - 99 mg/dL   POCT GLUCOSE   Result Value Ref Range    POCT Glucose 143 (H) 74 - 99 mg/dL   POCT GLUCOSE   Result Value Ref Range    POCT Glucose 113 (H) 74 - 99 mg/dL   Basic metabolic panel   Result Value Ref Range    Glucose 147 (H) 74 - 99 mg/dL    Sodium 133 (L) 136 - 145 mmol/L    Potassium 3.5 3.5 - 5.3 mmol/L    Chloride 101 98 - 107 mmol/L    Bicarbonate 23 21 - 32 mmol/L    Anion Gap 13 10 - 20 mmol/L    Urea Nitrogen 24 (H) 6 - 23 mg/dL    Creatinine 1.08 0.50 - 1.30 mg/dL    eGFR 76 >60 mL/min/1.73m*2    Calcium 8.3 (L) 8.6 - 10.3 mg/dL   POCT GLUCOSE   Result Value Ref Range    POCT Glucose 157 (H) 74 - 99 mg/dL   POCT GLUCOSE   Result Value Ref Range    POCT Glucose 152 (H) 74 - 99 mg/dL   POCT GLUCOSE   Result Value Ref Range    POCT Glucose 192 (H) 74 - 99 mg/dL    POCT GLUCOSE   Result Value Ref Range    POCT Glucose 215 (H) 74 - 99 mg/dL   Basic metabolic panel   Result Value Ref Range    Glucose 216 (H) 74 - 99 mg/dL    Sodium 131 (L) 136 - 145 mmol/L    Potassium 3.6 3.5 - 5.3 mmol/L    Chloride 100 98 - 107 mmol/L    Bicarbonate 21 21 - 32 mmol/L    Anion Gap 14 10 - 20 mmol/L    Urea Nitrogen 22 6 - 23 mg/dL    Creatinine 1.08 0.50 - 1.30 mg/dL    eGFR 76 >60 mL/min/1.73m*2    Calcium 8.5 (L) 8.6 - 10.3 mg/dL   CBC and Auto Differential   Result Value Ref Range    WBC 11.2 4.4 - 11.3 x10*3/uL    nRBC 0.0 0.0 - 0.0 /100 WBCs    RBC 4.06 (L) 4.50 - 5.90 x10*6/uL    Hemoglobin 11.7 (L) 13.5 - 17.5 g/dL    Hematocrit 33.4 (L) 41.0 - 52.0 %    MCV 82 80 - 100 fL    MCH 28.8 26.0 - 34.0 pg    MCHC 35.0 32.0 - 36.0 g/dL    RDW 12.8 11.5 - 14.5 %    Platelets 185 150 - 450 x10*3/uL    Neutrophils % 81.6 40.0 - 80.0 %    Immature Granulocytes %, Automated 0.6 0.0 - 0.9 %    Lymphocytes % 8.8 13.0 - 44.0 %    Monocytes % 8.8 2.0 - 10.0 %    Eosinophils % 0.0 0.0 - 6.0 %    Basophils % 0.2 0.0 - 2.0 %    Neutrophils Absolute 9.13 (H) 1.20 - 7.70 x10*3/uL    Immature Granulocytes Absolute, Automated 0.07 0.00 - 0.70 x10*3/uL    Lymphocytes Absolute 0.99 (L) 1.20 - 4.80 x10*3/uL    Monocytes Absolute 0.98 0.10 - 1.00 x10*3/uL    Eosinophils Absolute 0.00 0.00 - 0.70 x10*3/uL    Basophils Absolute 0.02 0.00 - 0.10 x10*3/uL   Comprehensive Metabolic Panel   Result Value Ref Range    Glucose 216 (H) 74 - 99 mg/dL    Sodium 131 (L) 136 - 145 mmol/L    Potassium 3.6 3.5 - 5.3 mmol/L    Chloride 100 98 - 107 mmol/L    Bicarbonate 21 21 - 32 mmol/L    Anion Gap 14 10 - 20 mmol/L    Urea Nitrogen 22 6 - 23 mg/dL    Creatinine 1.08 0.50 - 1.30 mg/dL    eGFR 76 >60 mL/min/1.73m*2    Calcium 8.5 (L) 8.6 - 10.3 mg/dL    Albumin 3.8 3.4 - 5.0 g/dL    Alkaline Phosphatase 57 33 - 136 U/L    Total Protein 6.0 (L) 6.4 - 8.2 g/dL    AST 30 9 - 39 U/L    Bilirubin, Total 0.9 0.0 - 1.2 mg/dL    ALT  22 10 - 52 U/L   POCT GLUCOSE   Result Value Ref Range    POCT Glucose 230 (H) 74 - 99 mg/dL   POCT GLUCOSE   Result Value Ref Range    POCT Glucose 255 (H) 74 - 99 mg/dL   POCT GLUCOSE   Result Value Ref Range    POCT Glucose 257 (H) 74 - 99 mg/dL   Basic metabolic panel   Result Value Ref Range    Glucose 259 (H) 74 - 99 mg/dL    Sodium 132 (L) 136 - 145 mmol/L    Potassium 3.5 3.5 - 5.3 mmol/L    Chloride 102 98 - 107 mmol/L    Bicarbonate 20 (L) 21 - 32 mmol/L    Anion Gap 14 10 - 20 mmol/L    Urea Nitrogen 23 6 - 23 mg/dL    Creatinine 1.10 0.50 - 1.30 mg/dL    eGFR 74 >60 mL/min/1.73m*2    Calcium 8.3 (L) 8.6 - 10.3 mg/dL   POCT GLUCOSE   Result Value Ref Range    POCT Glucose 298 (H) 74 - 99 mg/dL          This patient currently has cardiac telemetry ordered; if you would like to modify or discontinue the telemetry order, click here to go to the orders activity to modify/discontinue the order.                 Assessment/Plan   Assessment & Plan  Dehydration    NSTEMI (non-ST elevated myocardial infarction) (Multi)    Diabetic ketoacidosis without coma associated with type 2 diabetes mellitus (Multi)    Pancolitis (Multi)    Vomiting    Bladder retention    Abdominal pain/Abnormal CT  Patient presented with abdominal cramping, nausea vomiting.  Was found to be in borderline DKA with dehydration. Patient reports after receiving IV fluid with improved control of blood sugar is feeling overall better. Due to CT findings of pancolitis, recommend colonoscopy at some point to further evaluate. Defer timing to attending. Will order inflammatory markers.  Stool studies have been ordered.  At this time we will continue antibiotics as ordered. Will discuss further recommendation with Dr. Ham.      1/23  Patient continues to feel nauseous, however no vomiting reported.  Abdominal pain has improved. Patient does have a increased risk for gastroparesis.  Will trial scheduled Reglan and advance diet to clears today.  If  no improvement, can consider EGD tomorrow.  Patient CRP was normal.  Patient remains afebrile, no leukocytosis. He is not having any diarrhea.  Will plan for outpatient colonoscopy.     Mary Frazier, APRN-CNP

## 2025-01-23 NOTE — PROGRESS NOTES
Occupational Therapy                 Therapy Communication Note    Patient Name: Devin Gomez  MRN: [id number]  Department: [facility] 4 ICU  Room: 407/[facility]  Today's Date: 1/23/2025     Discipline: Occupational Therapy    OT Missed Visit: Yes     Missed Visit Reason: Missed Visit Reason: Patient refused (Pt refusing all day d/t persistent nausea)    Missed Time: Cancel    Comment: 1/23 refusal all day

## 2025-01-23 NOTE — PROGRESS NOTES
Subjective Data:  Patient still nauseated.  Still complaining of abdominal pain.  Troponin with significant delta.  Denies having chest pain or diarrhea.    Overnight Events:    Telemetry overnight reviewed no events     Objective Data:  Last Recorded Vitals:  Vitals:    01/23/25 0755 01/23/25 0800 01/23/25 0900 01/23/25 1000   BP:  150/77 151/84 158/82   BP Location:       Patient Position:       Pulse:  98 95 88   Resp:  15 14 15   Temp:       TempSrc:       SpO2:  97% 97% 97%   Weight: 61 kg (134 lb 7.7 oz)      Height:           Last Labs:  CBC - 1/23/2025:  4:36 AM  11.2 11.7 185    33.4      CMP - 1/23/2025:  7:54 AM  8.3 6.0 30 --- 0.9   2.3 3.8 22 57      PTT - 1/22/2025:  6:15 AM  _   _ 24.9     TROPHS   Date/Time Value Ref Range Status   01/22/2025 01:29  0 - 20 ng/L Final     Comment:     Previous result verified on 1/21/2025 2211 on specimen/case [id number] called with component TRPHS for procedure Troponin I, High Sensitivity with value 59 ng/L.   01/22/2025 12:11  0 - 20 ng/L Final     Comment:     Previous result verified on 1/21/2025 2211 on specimen/case [id number] called with component TRPHS for procedure Troponin I, High Sensitivity with value 59 ng/L.   01/22/2025 03:33  0 - 20 ng/L Final     Comment:     Previous result verified on 1/21/2025 2211 on specimen/case [id number] called with component TRPHS for procedure Troponin I, High Sensitivity with value 59 ng/L.     BNP   Date/Time Value Ref Range Status   01/22/2025 12:11  0 - 99 pg/mL Final   01/21/2025 02:03  0 - 99 pg/mL Final     HGBA1C   Date/Time Value Ref Range Status   01/21/2025 08:22 PM 10.2 See comment % Final   03/18/2021 02:00 PM 8.9 4.0 - 6.0 % Final     Comment:     Hemoglobin A1C levels are related to mean blood glucose during the   preceding 2-3 months. The relationship table below may be used as a   general guide. Each 1% increase in HGB A1C is a reflection of an   increase in mean  [___] : [unfilled] glucose of approximately 30 mg/dl.   Reference: Diabetes [employer], volume 29, supplement 1 Jan. 2006                        HGB A1C ................. Approx. Mean Glucose   _______________________________________________   6%   ...............................  120 mg/dl   7%   ...............................  150 mg/dl   8%   ...............................  180 mg/dl   9%   ...............................  210 mg/dl   10%  ...............................  240 mg/dl  Performed at [address] [address]     10/30/2020 09:02 PM 8.3 4.0 - 6.0 % Final     Comment:     Hemoglobin A1C levels are related to mean blood glucose during the   preceding 2-3 months. The relationship table below may be used as a   general guide. Each 1% increase in HGB A1C is a reflection of an   increase in mean glucose of approximately 30 mg/dl.   Reference: Diabetes Care, volume 29, supplement 1 Jan. 2006                        HGB A1C ................. Approx. Mean Glucose   _______________________________________________   6%   ...............................  120 mg/dl   7%   ...............................  150 mg/dl   8%   ...............................  180 mg/dl   9%   ...............................  210 mg/dl   10%  ...............................  240 mg/dl  Performed at [address] [address]       LDLCALC   Date/Time Value Ref Range Status   07/31/2019 08:45 AM 55 65 - 130 MG/DL Final      Last I/O:  I/O last 3 completed shifts:  In: 5186 (84.8 mL/kg) [I.V.:3052.1 (49.9 mL/kg); IV Piggyback:2133.9]  Out: 3120 (51 mL/kg) [Urine:3120 (1.4 mL/kg/hr)]  Weight: 61.1 kg     Past Cardiology Tests (Last 3 Years):  EKG:  ECG 12 lead 01/22/2025    Echo:  Transthoracic Echo (TTE) Complete 01/22/2025    Ejection Fractions:  EF   Date/Time Value Ref Range Status   01/22/2025 09:49 AM 63 %      Cath:  No results found for this or any previous visit from the past 1095 days.    Stress Test:  No results  found for this or any previous visit from the past 1095 days.    Cardiac Imaging:  No results found for this or any previous visit from the past 1095 days.      Inpatient Medications:  Scheduled medications   Medication Dose Route Frequency    aspirin  81 mg oral Daily    heparin  5,000 Units subcutaneous q8h    metoclopramide  10 mg intravenous q6h EUFEMIA    metoprolol tartrate  25 mg oral BID    pantoprazole  40 mg intravenous BID    piperacillin-tazobactam  3.375 g intravenous q6h     PRN medications   Medication    acetaminophen    Or    acetaminophen    Or    acetaminophen    dextrose 10 % in water (D10W)    dextrose 10 % in water (D10W)    dextrose 5%-0.45 % sodium chloride    dextrose    dextrose    dextrose    glucagon    glucagon    heparin (porcine)    hydrALAZINE    melatonin    ondansetron ODT    Or    ondansetron     Continuous Medications   Medication Dose Last Rate    dextrose 10 % in water (D10W)  150 mL/hr Stopped (01/23/25 0100)    dextrose 10 % in water (D10W)  150 mL/hr      dextrose 5%-0.45 % sodium chloride  150 mL/hr Stopped (01/23/25 1013)    insulin regular  1 Units/hr 2 Units/hr (01/23/25 0916)    lactated Ringer's  150 mL/hr 150 mL/hr (01/23/25 1013)       Physical Exam:  General: Patient is in no acute distress.  HEENT: atraumatic normocephalic.  Neck: is supple jugular venous pressure within normal limits no thyromegaly.  Cardiovascular regular rate and rhythm normal heart sounds no murmurs rubs or gallops.  Lungs: clear to auscultation bilaterally.  Abdomen: is soft mild tenderness to palpation.  No guarding or rebound.  Extremities warm to touch no edema.  Neurologic examination: patient is awake alert oriented to person, place, date/time.  Psychiatric examination: patient has good insight denies feeling suicidal and depressed.  Pulses 2+ intact bilaterally        Assessment/Plan  #1 elevated troponin.  Patient with history of diabetes hypertension hyperlipidemia and peripheral vascular  disease admitted to the hospital with weakness nausea vomiting severe abdominal pain and diarrhea.  Abdominal CT showed pancolitis.  Troponin mildly elevated.  EKG within normal limit.  Patient has no chest pain.  Whether nausea is related to acute coronary syndrome I do believe is less likely most likely this is secondary to his pancolitis.  Undergoing workup for norovirus infection.  I do not believe that patient has acute coronary syndrome especially that his EKG within normal limit but I do recommend him to be on aspirin.  Do not recommend heparin for now.  Will wait for 24 for 8 hours to assess improvement of his abdomen.  Await 2D echo.  Start him today on metoprolol to slow down his heart rate and blood pressure.  Will check another troponin today.  Has scheduled abdominal CT today we will reassess pancolitis if it is improving patient not feeling better we will proceed with cardiac catheterization     2.  Peripheral vascular disease.  Recommend baby aspirin high intensity statin once he is more stable.  Control of blood pressure.     3.  Diabetes mellitus.  Patient admitted with DKA picture likely secondary to pancolitis.  Blood sugars improving.  Anion gap closing.  Will monitor.  Management by primary team.     4.  Hypertension.  Blood pressure and heart rate well-controlled.  Will monitor.  5.  Hyperlipidemia.  Recommend to start high intensity statin once he is more stable.  Peripheral IV 01/21/25 20 G Right;Dorsal Wrist (Active)   Site Assessment Clean;Dry;Intact 01/22/25 2100   Dressing Status Clean;Dry;Occlusive 01/22/25 2100   Number of days: 2       Peripheral IV 01/22/25 20 G Right;Anterior Forearm (Active)   Site Assessment Clean;Dry;Intact 01/23/25 0811   Dressing Status Clean;Dry;Occlusive 01/23/25 0811   Number of days: 1       Peripheral IV 01/23/25 20 G Left;Anterior Forearm (Active)   Site Assessment Clean;Dry;Intact 01/23/25 0811   Dressing Status Clean;Dry;Occlusive 01/23/25 0811   Number  of days: 0       Code Status:  Full Code      Hubert Vidales MD

## 2025-01-23 NOTE — PROGRESS NOTES
01/23/25 1607   Discharge Planning   Living Arrangements Alone   Support Systems Friends/neighbors   Type of Residence Private residence   Home or Post Acute Services   (Awaiting therapy updates to assist in assessing d/c needs.  Care Transitions following.)   Expected Discharge Disposition [facility]  (TBD)

## 2025-01-23 NOTE — CARE PLAN
The patient's goals for the shift include      The clinical goals for the shift include nausea control. get insulin gtt off

## 2025-01-23 NOTE — PROGRESS NOTES
Devin Gomez is a 64 y.o. male on day 2 of admission presenting with Dehydration.      Subjective   He continues to be profoundly nauseous denies any abdominal pain any chest pain any shortness of breath any headache    Discussed with cardiology in detail  Discussed with GI in detail       Objective     Last Recorded Vitals  /82   Pulse 88   Temp 36.2 °C (97.2 °F) (Temporal)   Resp 15   Wt 61 kg (134 lb 7.7 oz)   SpO2 97%   Intake/Output last 3 Shifts:    Intake/Output Summary (Last 24 hours) at 1/23/2025 1106  Last data filed at 1/23/2025 1013  Gross per 24 hour   Intake 5895.52 ml   Output 2020 ml   Net 3875.52 ml       Physical Exam  Alert oriented x 3 cooperative  Normocephalic/atraumatic EOMI PERRLA  Oral mucosa seems better hydrated  Neck supple  Chest clear bilaterally  Heart regular S1-S2 clear  Abdomen positive bowel sounds soft nontender benign exam  Extremities no peripheral edema  Neurologic examination is gross motor sensor nonfocal  Psych no psychosis  Relevant Results  Reviewed lab work  Assessment/Plan     Assessment & Plan  Dehydration  Pancolitis  Complaining of stomach cramping  Abdomen pelvis showed pancolitis  Elevated lactate  IV fluids half-normal saline at 150 an hour  Clear liquid diet  Stool panel  Zosyn  Consult gastroenterology  Type 2 diabetes mellitus with hyperglycemia  Blood glucose 283 and will give 5 units IV insulin  Check ABG  Check blood glucose every 2 hours  Check BMP every 4 hours  High blood pressure without diagnosis of hypertension  Hydralazine for systolic blood pressure greater than 160  Diffuse abdominal aorta plaque  Consider vascular consult outpatient  Urinary bladder distention  Bladder scan for over 1100  Patient was straight cath  Will do bladder scans every 6 hours and straight cath for residual greater than 350 mL  DVT prophylaxis  Lovenox  NSTEMI (non-ST elevated myocardial infarction) (Multi)    Diabetic ketoacidosis without coma associated with  type 2 diabetes mellitus (Multi)    Pancolitis (Multi)    Vomiting    Bladder retention      January 23,    DKA was borderline to begin with and has been largely improved.  I feel he should be transition to subcutaneous insulin now    Nausea, unclear etiology at this time.  Initially I thought it may be due to DKA but it did not improve as it normally does with DKA improvement.  Spoke with GI in length they feel no need to rush endoscopy at this time try Reglan.  Spoke with cardiology in length if this could be anginal equivalent did not convert Stader and repeat troponins actually downtrending; I have decided to obtain follow-up imaging including CTA chest abdomen to try to get a better explanation of nausea continue IV PPI try clear liquid diet try Reglan; continue empiric antibiotics he has not had any bowel movement since admission    Question non-STEMI, per cardiology he will need eventually heart cath but not right at this moment                 Ismael Knowles MD

## 2025-01-24 PROBLEM — R26.2 AMBULATORY DYSFUNCTION: Status: ACTIVE | Noted: 2025-01-24

## 2025-01-24 LAB
ALBUMIN SERPL BCP-MCNC: 3.7 G/DL (ref 3.4–5)
ALP SERPL-CCNC: 57 U/L (ref 33–136)
ALT SERPL W P-5'-P-CCNC: 23 U/L (ref 10–52)
ANION GAP SERPL CALCULATED.3IONS-SCNC: 11 MMOL/L (ref 10–20)
ANION GAP SERPL CALCULATED.3IONS-SCNC: 13 MMOL/L (ref 10–20)
ANION GAP SERPL CALCULATED.3IONS-SCNC: 14 MMOL/L (ref 10–20)
ANION GAP SERPL CALCULATED.3IONS-SCNC: 14 MMOL/L (ref 10–20)
ANION GAP SERPL CALCULATED.3IONS-SCNC: 15 MMOL/L (ref 10–20)
AST SERPL W P-5'-P-CCNC: 39 U/L (ref 9–39)
BASOPHILS # BLD AUTO: 0.03 X10*3/UL (ref 0–0.1)
BASOPHILS NFR BLD AUTO: 0.3 %
BILIRUB SERPL-MCNC: 0.9 MG/DL (ref 0–1.2)
BUN SERPL-MCNC: 19 MG/DL (ref 6–23)
BUN SERPL-MCNC: 21 MG/DL (ref 6–23)
BUN SERPL-MCNC: 24 MG/DL (ref 6–23)
BUN SERPL-MCNC: 26 MG/DL (ref 6–23)
BUN SERPL-MCNC: 26 MG/DL (ref 6–23)
CALCIUM SERPL-MCNC: 8.1 MG/DL (ref 8.6–10.3)
CALCIUM SERPL-MCNC: 8.2 MG/DL (ref 8.6–10.3)
CALCIUM SERPL-MCNC: 8.4 MG/DL (ref 8.6–10.3)
CHLORIDE SERPL-SCNC: 100 MMOL/L (ref 98–107)
CHLORIDE SERPL-SCNC: 101 MMOL/L (ref 98–107)
CHLORIDE SERPL-SCNC: 101 MMOL/L (ref 98–107)
CHLORIDE SERPL-SCNC: 102 MMOL/L (ref 98–107)
CHLORIDE SERPL-SCNC: 103 MMOL/L (ref 98–107)
CO2 SERPL-SCNC: 21 MMOL/L (ref 21–32)
CO2 SERPL-SCNC: 22 MMOL/L (ref 21–32)
CO2 SERPL-SCNC: 24 MMOL/L (ref 21–32)
CREAT SERPL-MCNC: 1.09 MG/DL (ref 0.5–1.3)
CREAT SERPL-MCNC: 1.1 MG/DL (ref 0.5–1.3)
CREAT SERPL-MCNC: 1.2 MG/DL (ref 0.5–1.3)
CREAT SERPL-MCNC: 1.25 MG/DL (ref 0.5–1.3)
CREAT SERPL-MCNC: 1.26 MG/DL (ref 0.5–1.3)
EGFRCR SERPLBLD CKD-EPI 2021: 63 ML/MIN/1.73M*2
EGFRCR SERPLBLD CKD-EPI 2021: 64 ML/MIN/1.73M*2
EGFRCR SERPLBLD CKD-EPI 2021: 67 ML/MIN/1.73M*2
EGFRCR SERPLBLD CKD-EPI 2021: 74 ML/MIN/1.73M*2
EGFRCR SERPLBLD CKD-EPI 2021: 75 ML/MIN/1.73M*2
EOSINOPHIL # BLD AUTO: 0.01 X10*3/UL (ref 0–0.7)
EOSINOPHIL NFR BLD AUTO: 0.1 %
ERYTHROCYTE [DISTWIDTH] IN BLOOD BY AUTOMATED COUNT: 13.2 % (ref 11.5–14.5)
GLUCOSE BLD MANUAL STRIP-MCNC: 136 MG/DL (ref 74–99)
GLUCOSE BLD MANUAL STRIP-MCNC: 163 MG/DL (ref 74–99)
GLUCOSE BLD MANUAL STRIP-MCNC: 200 MG/DL (ref 74–99)
GLUCOSE BLD MANUAL STRIP-MCNC: 223 MG/DL (ref 74–99)
GLUCOSE SERPL-MCNC: 115 MG/DL (ref 74–99)
GLUCOSE SERPL-MCNC: 127 MG/DL (ref 74–99)
GLUCOSE SERPL-MCNC: 130 MG/DL (ref 74–99)
GLUCOSE SERPL-MCNC: 167 MG/DL (ref 74–99)
GLUCOSE SERPL-MCNC: 201 MG/DL (ref 74–99)
HCT VFR BLD AUTO: 36.2 % (ref 41–52)
HGB BLD-MCNC: 12.2 G/DL (ref 13.5–17.5)
IMM GRANULOCYTES # BLD AUTO: 0.04 X10*3/UL (ref 0–0.7)
IMM GRANULOCYTES NFR BLD AUTO: 0.5 % (ref 0–0.9)
LYMPHOCYTES # BLD AUTO: 1.16 X10*3/UL (ref 1.2–4.8)
LYMPHOCYTES NFR BLD AUTO: 13.2 %
MCH RBC QN AUTO: 28.4 PG (ref 26–34)
MCHC RBC AUTO-ENTMCNC: 33.7 G/DL (ref 32–36)
MCV RBC AUTO: 84 FL (ref 80–100)
MONOCYTES # BLD AUTO: 0.8 X10*3/UL (ref 0.1–1)
MONOCYTES NFR BLD AUTO: 9.1 %
NEUTROPHILS # BLD AUTO: 6.76 X10*3/UL (ref 1.2–7.7)
NEUTROPHILS NFR BLD AUTO: 76.8 %
NRBC BLD-RTO: 0 /100 WBCS (ref 0–0)
PLATELET # BLD AUTO: 192 X10*3/UL (ref 150–450)
POTASSIUM SERPL-SCNC: 3.2 MMOL/L (ref 3.5–5.3)
POTASSIUM SERPL-SCNC: 3.5 MMOL/L (ref 3.5–5.3)
POTASSIUM SERPL-SCNC: 3.5 MMOL/L (ref 3.5–5.3)
POTASSIUM SERPL-SCNC: 3.6 MMOL/L (ref 3.5–5.3)
POTASSIUM SERPL-SCNC: 3.6 MMOL/L (ref 3.5–5.3)
PROT SERPL-MCNC: 5.9 G/DL (ref 6.4–8.2)
RBC # BLD AUTO: 4.29 X10*6/UL (ref 4.5–5.9)
SODIUM SERPL-SCNC: 132 MMOL/L (ref 136–145)
SODIUM SERPL-SCNC: 133 MMOL/L (ref 136–145)
SODIUM SERPL-SCNC: 133 MMOL/L (ref 136–145)
SODIUM SERPL-SCNC: 134 MMOL/L (ref 136–145)
SODIUM SERPL-SCNC: 134 MMOL/L (ref 136–145)
WBC # BLD AUTO: 8.8 X10*3/UL (ref 4.4–11.3)

## 2025-01-24 PROCEDURE — 97535 SELF CARE MNGMENT TRAINING: CPT | Mod: GO

## 2025-01-24 PROCEDURE — 2500000002 HC RX 250 W HCPCS SELF ADMINISTERED DRUGS (ALT 637 FOR MEDICARE OP, ALT 636 FOR OP/ED): Performed by: INTERNAL MEDICINE

## 2025-01-24 PROCEDURE — 36415 COLL VENOUS BLD VENIPUNCTURE: CPT | Performed by: INTERNAL MEDICINE

## 2025-01-24 PROCEDURE — 84075 ASSAY ALKALINE PHOSPHATASE: CPT | Performed by: INTERNAL MEDICINE

## 2025-01-24 PROCEDURE — 83993 ASSAY FOR CALPROTECTIN FECAL: CPT

## 2025-01-24 PROCEDURE — 87506 IADNA-DNA/RNA PROBE TQ 6-11: CPT | Mod: TRILAB | Performed by: INTERNAL MEDICINE

## 2025-01-24 PROCEDURE — 82374 ASSAY BLOOD CARBON DIOXIDE: CPT | Performed by: INTERNAL MEDICINE

## 2025-01-24 PROCEDURE — 2500000004 HC RX 250 GENERAL PHARMACY W/ HCPCS (ALT 636 FOR OP/ED): Performed by: INTERNAL MEDICINE

## 2025-01-24 PROCEDURE — 82947 ASSAY GLUCOSE BLOOD QUANT: CPT

## 2025-01-24 PROCEDURE — 51701 INSERT BLADDER CATHETER: CPT

## 2025-01-24 PROCEDURE — 97116 GAIT TRAINING THERAPY: CPT | Mod: GP

## 2025-01-24 PROCEDURE — 97530 THERAPEUTIC ACTIVITIES: CPT | Mod: GP

## 2025-01-24 PROCEDURE — 80048 BASIC METABOLIC PNL TOTAL CA: CPT | Mod: CCI | Performed by: INTERNAL MEDICINE

## 2025-01-24 PROCEDURE — 2060000001 HC INTERMEDIATE ICU ROOM DAILY

## 2025-01-24 PROCEDURE — 99232 SBSQ HOSP IP/OBS MODERATE 35: CPT | Performed by: INTERNAL MEDICINE

## 2025-01-24 PROCEDURE — 2500000002 HC RX 250 W HCPCS SELF ADMINISTERED DRUGS (ALT 637 FOR MEDICARE OP, ALT 636 FOR OP/ED): Performed by: SURGERY

## 2025-01-24 PROCEDURE — 85025 COMPLETE CBC W/AUTO DIFF WBC: CPT | Performed by: INTERNAL MEDICINE

## 2025-01-24 PROCEDURE — 2500000004 HC RX 250 GENERAL PHARMACY W/ HCPCS (ALT 636 FOR OP/ED)

## 2025-01-24 PROCEDURE — 2500000001 HC RX 250 WO HCPCS SELF ADMINISTERED DRUGS (ALT 637 FOR MEDICARE OP): Performed by: INTERNAL MEDICINE

## 2025-01-24 RX ORDER — POTASSIUM CHLORIDE 14.9 MG/ML
20 INJECTION INTRAVENOUS ONCE
Status: DISCONTINUED | OUTPATIENT
Start: 2025-01-25 | End: 2025-01-24

## 2025-01-24 RX ORDER — POTASSIUM CHLORIDE 14.9 MG/ML
20 INJECTION INTRAVENOUS
Status: COMPLETED | OUTPATIENT
Start: 2025-01-25 | End: 2025-01-25

## 2025-01-24 RX ADMIN — ACETAMINOPHEN 650 MG: 325 TABLET ORAL at 15:27

## 2025-01-24 RX ADMIN — METOCLOPRAMIDE HYDROCHLORIDE 10 MG: 5 INJECTION INTRAMUSCULAR; INTRAVENOUS at 23:08

## 2025-01-24 RX ADMIN — HEPARIN SODIUM 5000 UNITS: 5000 INJECTION, SOLUTION INTRAVENOUS; SUBCUTANEOUS at 20:13

## 2025-01-24 RX ADMIN — PANTOPRAZOLE SODIUM 40 MG: 40 INJECTION, POWDER, FOR SOLUTION INTRAVENOUS at 20:13

## 2025-01-24 RX ADMIN — POLYETHYLENE GLYCOL 3350 17 G: 17 POWDER, FOR SOLUTION ORAL at 14:53

## 2025-01-24 RX ADMIN — PIPERACILLIN SODIUM AND TAZOBACTAM SODIUM 3.38 G: 3; .375 INJECTION, SOLUTION INTRAVENOUS at 14:53

## 2025-01-24 RX ADMIN — METOCLOPRAMIDE HYDROCHLORIDE 10 MG: 5 INJECTION INTRAMUSCULAR; INTRAVENOUS at 11:44

## 2025-01-24 RX ADMIN — METOPROLOL TARTRATE 25 MG: 25 TABLET, FILM COATED ORAL at 20:13

## 2025-01-24 RX ADMIN — METOCLOPRAMIDE HYDROCHLORIDE 10 MG: 5 INJECTION INTRAMUSCULAR; INTRAVENOUS at 06:04

## 2025-01-24 RX ADMIN — PIPERACILLIN SODIUM AND TAZOBACTAM SODIUM 3.38 G: 3; .375 INJECTION, SOLUTION INTRAVENOUS at 01:41

## 2025-01-24 RX ADMIN — INSULIN LISPRO 3 UNITS: 100 INJECTION, SOLUTION INTRAVENOUS; SUBCUTANEOUS at 11:55

## 2025-01-24 RX ADMIN — PIPERACILLIN SODIUM AND TAZOBACTAM SODIUM 3.38 G: 3; .375 INJECTION, SOLUTION INTRAVENOUS at 09:15

## 2025-01-24 RX ADMIN — HEPARIN SODIUM 5000 UNITS: 5000 INJECTION, SOLUTION INTRAVENOUS; SUBCUTANEOUS at 04:04

## 2025-01-24 RX ADMIN — ASPIRIN 81 MG CHEWABLE TABLET 81 MG: 81 TABLET CHEWABLE at 09:16

## 2025-01-24 RX ADMIN — PIPERACILLIN SODIUM AND TAZOBACTAM SODIUM 3.38 G: 3; .375 INJECTION, SOLUTION INTRAVENOUS at 20:13

## 2025-01-24 RX ADMIN — PANTOPRAZOLE SODIUM 40 MG: 40 INJECTION, POWDER, FOR SOLUTION INTRAVENOUS at 09:16

## 2025-01-24 RX ADMIN — HYDRALAZINE HYDROCHLORIDE 5 MG: 20 INJECTION INTRAMUSCULAR; INTRAVENOUS at 04:13

## 2025-01-24 RX ADMIN — METOPROLOL TARTRATE 25 MG: 25 TABLET, FILM COATED ORAL at 09:15

## 2025-01-24 RX ADMIN — POLYETHYLENE GLYCOL 3350 17 G: 17 POWDER, FOR SOLUTION ORAL at 09:15

## 2025-01-24 RX ADMIN — METOCLOPRAMIDE HYDROCHLORIDE 10 MG: 5 INJECTION INTRAMUSCULAR; INTRAVENOUS at 17:19

## 2025-01-24 RX ADMIN — INSULIN GLARGINE 15 UNITS: 100 INJECTION, SOLUTION SUBCUTANEOUS at 11:55

## 2025-01-24 RX ADMIN — HEPARIN SODIUM 5000 UNITS: 5000 INJECTION, SOLUTION INTRAVENOUS; SUBCUTANEOUS at 11:44

## 2025-01-24 RX ADMIN — TAMSULOSIN HYDROCHLORIDE 0.4 MG: 0.4 CAPSULE ORAL at 06:04

## 2025-01-24 ASSESSMENT — COGNITIVE AND FUNCTIONAL STATUS - GENERAL
PERSONAL GROOMING: A LITTLE
STANDING UP FROM CHAIR USING ARMS: A LITTLE
DAILY ACTIVITIY SCORE: 17
HELP NEEDED FOR BATHING: A LITTLE
TOILETING: A LOT
TURNING FROM BACK TO SIDE WHILE IN FLAT BAD: A LITTLE
MOBILITY SCORE: 15
DRESSING REGULAR LOWER BODY CLOTHING: A LITTLE
DRESSING REGULAR UPPER BODY CLOTHING: A LITTLE
EATING MEALS: A LITTLE
MOVING FROM LYING ON BACK TO SITTING ON SIDE OF FLAT BED WITH BEDRAILS: A LITTLE
WALKING IN HOSPITAL ROOM: A LOT
CLIMB 3 TO 5 STEPS WITH RAILING: TOTAL
MOVING TO AND FROM BED TO CHAIR: A LITTLE

## 2025-01-24 ASSESSMENT — PAIN SCALES - GENERAL
PAINLEVEL_OUTOF10: 0 - NO PAIN
PAINLEVEL_OUTOF10: 3
PAINLEVEL_OUTOF10: 0 - NO PAIN

## 2025-01-24 ASSESSMENT — PAIN - FUNCTIONAL ASSESSMENT
PAIN_FUNCTIONAL_ASSESSMENT: 0-10

## 2025-01-24 ASSESSMENT — ACTIVITIES OF DAILY LIVING (ADL)
BATHING_WHERE_ASSESSED: EDGE OF BED
LACK_OF_TRANSPORTATION: NO
BATHING_LEVEL_OF_ASSISTANCE: MINIMUM ASSISTANCE
HOME_MANAGEMENT_TIME_ENTRY: 25

## 2025-01-24 ASSESSMENT — PAIN DESCRIPTION - LOCATION: LOCATION: ABDOMEN

## 2025-01-24 NOTE — PROGRESS NOTES
"Devin Gomez is a 64 y.o. male on day 3 of admission presenting with Dehydration.    Subjective   Still having some nausea. Denies abdominal pain. Denies bloody stools       Objective     Physical Exam  HENT:      Head: Normocephalic.      Nose: Nose normal.      Mouth/Throat:      Mouth: Mucous membranes are moist.   Eyes:      Pupils: Pupils are equal, round, and reactive to light.   Cardiovascular:      Rate and Rhythm: Regular rhythm.   Pulmonary:      Breath sounds: Normal breath sounds.   Abdominal:      Palpations: Abdomen is soft.   Musculoskeletal:      Cervical back: Normal range of motion.   Skin:     General: Skin is warm.   Neurological:      General: No focal deficit present.      Mental Status: He is alert.         Last Recorded Vitals  Blood pressure 123/65, pulse 57, temperature 36.6 °C (97.9 °F), resp. rate 18, height 1.753 m (5' 9\"), weight 61 kg (134 lb 7.7 oz), SpO2 96%.  Intake/Output last 3 Shifts:  I/O last 3 completed shifts:  In: 3113.1 (51 mL/kg) [I.V.:2412.1 (39.5 mL/kg); IV Piggyback:701]  Out: 1999 (32.7 mL/kg) [Urine:1999 (0.9 mL/kg/hr)]  Weight: 61.1 kg     Relevant Results      Scheduled medications  aspirin, 81 mg, oral, Daily  heparin, 5,000 Units, subcutaneous, q8h  insulin glargine, 15 Units, subcutaneous, q24h  insulin lispro, 0-15 Units, subcutaneous, TID AC  metoclopramide, 10 mg, intravenous, q6h EUFEMIA  metoprolol tartrate, 25 mg, oral, BID  pantoprazole, 40 mg, intravenous, BID  piperacillin-tazobactam, 3.375 g, intravenous, q6h  polyethylene glycol, 17 g, oral, TID  tamsulosin, 0.4 mg, oral, Daily before breakfast      Continuous medications       PRN medications  PRN medications: acetaminophen **OR** acetaminophen **OR** acetaminophen, dextrose, dextrose, dextrose, glucagon, glucagon, heparin (porcine), hydrALAZINE, melatonin, ondansetron ODT **OR** ondansetron  Results for orders placed or performed during the hospital encounter of 01/21/25 (from the past 24 hours) "   POCT GLUCOSE   Result Value Ref Range    POCT Glucose 235 (H) 74 - 99 mg/dL   POCT GLUCOSE   Result Value Ref Range    POCT Glucose 210 (H) 74 - 99 mg/dL   Basic metabolic panel   Result Value Ref Range    Glucose 153 (H) 74 - 99 mg/dL    Sodium 133 (L) 136 - 145 mmol/L    Potassium 3.3 (L) 3.5 - 5.3 mmol/L    Chloride 103 98 - 107 mmol/L    Bicarbonate 22 21 - 32 mmol/L    Anion Gap 11 10 - 20 mmol/L    Urea Nitrogen 21 6 - 23 mg/dL    Creatinine 1.05 0.50 - 1.30 mg/dL    eGFR 78 >60 mL/min/1.73m*2    Calcium 8.4 (L) 8.6 - 10.3 mg/dL   POCT GLUCOSE   Result Value Ref Range    POCT Glucose 68 (L) 74 - 99 mg/dL   POCT GLUCOSE   Result Value Ref Range    POCT Glucose 66 (L) 74 - 99 mg/dL   POCT GLUCOSE   Result Value Ref Range    POCT Glucose 82 74 - 99 mg/dL   Basic metabolic panel   Result Value Ref Range    Glucose 101 (H) 74 - 99 mg/dL    Sodium 132 (L) 136 - 145 mmol/L    Potassium 3.8 3.5 - 5.3 mmol/L    Chloride 103 98 - 107 mmol/L    Bicarbonate 21 21 - 32 mmol/L    Anion Gap 12 10 - 20 mmol/L    Urea Nitrogen 20 6 - 23 mg/dL    Creatinine 1.05 0.50 - 1.30 mg/dL    eGFR 78 >60 mL/min/1.73m*2    Calcium 8.5 (L) 8.6 - 10.3 mg/dL   POCT GLUCOSE   Result Value Ref Range    POCT Glucose 122 (H) 74 - 99 mg/dL   CBC and Auto Differential   Result Value Ref Range    WBC 8.8 4.4 - 11.3 x10*3/uL    nRBC 0.0 0.0 - 0.0 /100 WBCs    RBC 4.29 (L) 4.50 - 5.90 x10*6/uL    Hemoglobin 12.2 (L) 13.5 - 17.5 g/dL    Hematocrit 36.2 (L) 41.0 - 52.0 %    MCV 84 80 - 100 fL    MCH 28.4 26.0 - 34.0 pg    MCHC 33.7 32.0 - 36.0 g/dL    RDW 13.2 11.5 - 14.5 %    Platelets 192 150 - 450 x10*3/uL    Neutrophils % 76.8 40.0 - 80.0 %    Immature Granulocytes %, Automated 0.5 0.0 - 0.9 %    Lymphocytes % 13.2 13.0 - 44.0 %    Monocytes % 9.1 2.0 - 10.0 %    Eosinophils % 0.1 0.0 - 6.0 %    Basophils % 0.3 0.0 - 2.0 %    Neutrophils Absolute 6.76 1.20 - 7.70 x10*3/uL    Immature Granulocytes Absolute, Automated 0.04 0.00 - 0.70 x10*3/uL     Lymphocytes Absolute 1.16 (L) 1.20 - 4.80 x10*3/uL    Monocytes Absolute 0.80 0.10 - 1.00 x10*3/uL    Eosinophils Absolute 0.01 0.00 - 0.70 x10*3/uL    Basophils Absolute 0.03 0.00 - 0.10 x10*3/uL   Comprehensive Metabolic Panel   Result Value Ref Range    Glucose 115 (H) 74 - 99 mg/dL    Sodium 134 (L) 136 - 145 mmol/L    Potassium 3.5 3.5 - 5.3 mmol/L    Chloride 103 98 - 107 mmol/L    Bicarbonate 22 21 - 32 mmol/L    Anion Gap 13 10 - 20 mmol/L    Urea Nitrogen 19 6 - 23 mg/dL    Creatinine 1.10 0.50 - 1.30 mg/dL    eGFR 74 >60 mL/min/1.73m*2    Calcium 8.4 (L) 8.6 - 10.3 mg/dL    Albumin 3.7 3.4 - 5.0 g/dL    Alkaline Phosphatase 57 33 - 136 U/L    Total Protein 5.9 (L) 6.4 - 8.2 g/dL    AST 39 9 - 39 U/L    Bilirubin, Total 0.9 0.0 - 1.2 mg/dL    ALT 23 10 - 52 U/L   Basic metabolic panel   Result Value Ref Range    Glucose 127 (H) 74 - 99 mg/dL    Sodium 134 (L) 136 - 145 mmol/L    Potassium 3.5 3.5 - 5.3 mmol/L    Chloride 102 98 - 107 mmol/L    Bicarbonate 21 21 - 32 mmol/L    Anion Gap 15 10 - 20 mmol/L    Urea Nitrogen 21 6 - 23 mg/dL    Creatinine 1.09 0.50 - 1.30 mg/dL    eGFR 75 >60 mL/min/1.73m*2    Calcium 8.4 (L) 8.6 - 10.3 mg/dL          This patient currently has cardiac telemetry ordered; if you would like to modify or discontinue the telemetry order, click here to go to the orders activity to modify/discontinue the order.                 Assessment/Plan   Assessment & Plan  Dehydration    NSTEMI (non-ST elevated myocardial infarction) (Multi)    Diabetic ketoacidosis without coma associated with type 2 diabetes mellitus (Multi)    Pancolitis (Multi)    Vomiting    Bladder retention    Abdominal pain/Abnormal CT  Patient presented with abdominal cramping, nausea vomiting.  Was found to be in borderline DKA with dehydration. Patient reports after receiving IV fluid with improved control of blood sugar is feeling overall better. Due to CT findings of pancolitis, recommend colonoscopy at some point  to further evaluate. Defer timing to attending. Will order inflammatory markers.  Stool studies have been ordered.  At this time we will continue antibiotics as ordered. Will discuss further recommendation with Dr. Ham.      1/23  Patient continues to feel nauseous, however no vomiting reported.  Abdominal pain has improved. Patient does have a increased risk for gastroparesis.  Will trial scheduled Reglan and advance diet to clears today.  If no improvement, can consider EGD tomorrow.  Patient CRP was normal.  Patient remains afebrile, no leukocytosis. He is not having any diarrhea.  Will plan for outpatient colonoscopy.     1/24  Pending stool results, we will consider EGD/colonoscopy on Monday. Pending fecal calprotectin. Continue supportive care. He has never had a colonoscopy. CT showing descending colonic thickening, ?ischemic colitis vs infectious.    Ally Ratliff, APRN-CNP

## 2025-01-24 NOTE — CARE PLAN
The patient's goals for the shift include      The clinical goals for the shift include possible EGD, control nausea

## 2025-01-24 NOTE — CONSULTS
"Reason For Consult  Urinary retention    History Of Present Illness  Devin Gomez is a 64 y.o. male presenting with abdominal pain and hyperglycemia.  He has Type 1 diabetes, is on insulin at home.  During his work up a CT done revealed pan colitis.  During his hospital admission a straight cathter was inserted for a residual of 700 ml.  His CT revealed a distended bladder, but no hydronephrosis.  He had some voiding issues at home, notably hesitancy and frequency.  No hematuria.  His diabetes is sometimes poorly controlled.       Past Medical History  He has a past medical history of Diabetes mellitus (Multi).    Surgical History  He has no past surgical history on file.     Social History  He reports that he has never smoked. He has never used smokeless tobacco. He reports that he does not currently use alcohol. He reports that he does not currently use drugs.    Family History  No family history on file.     Allergies  Patient has no known allergies.    Review of Systems  Review of Systems   Constitutional: Positive for malaise/fatigue. Negative for fever.   Cardiovascular: Negative.    Respiratory: Negative.     Gastrointestinal:  Positive for abdominal pain, constipation and nausea.   Genitourinary:  Positive for hesitancy. Negative for dysuria, flank pain and hematuria.          Physical Exam  Awake, alert, no distress  Breathing comfortably, respirations unlabored  Abdomen soft, ND, NT, minimally tender  Circumcised, patent meatus  Bilateral descended testes, no masses         Last Recorded Vitals  Blood pressure (!) 186/91, pulse 94, temperature 36.2 °C (97.2 °F), temperature source Temporal, resp. rate 17, height 1.753 m (5' 9\"), weight 61 kg (134 lb 7.7 oz), SpO2 98%.    Relevant Results      Results for orders placed or performed during the hospital encounter of 01/21/25 (from the past 24 hours)   POCT GLUCOSE   Result Value Ref Range    POCT Glucose 168 (H) 74 - 99 mg/dL   Basic metabolic panel "   Result Value Ref Range    Glucose 179 (H) 74 - 99 mg/dL    Sodium 132 (L) 136 - 145 mmol/L    Potassium 3.5 3.5 - 5.3 mmol/L    Chloride 101 98 - 107 mmol/L    Bicarbonate 21 21 - 32 mmol/L    Anion Gap 14 10 - 20 mmol/L    Urea Nitrogen 26 (H) 6 - 23 mg/dL    Creatinine 1.17 0.50 - 1.30 mg/dL    eGFR 69 >60 mL/min/1.73m*2    Calcium 8.7 8.6 - 10.3 mg/dL   POCT GLUCOSE   Result Value Ref Range    POCT Glucose 152 (H) 74 - 99 mg/dL   POCT GLUCOSE   Result Value Ref Range    POCT Glucose 143 (H) 74 - 99 mg/dL   POCT GLUCOSE   Result Value Ref Range    POCT Glucose 113 (H) 74 - 99 mg/dL   Basic metabolic panel   Result Value Ref Range    Glucose 147 (H) 74 - 99 mg/dL    Sodium 133 (L) 136 - 145 mmol/L    Potassium 3.5 3.5 - 5.3 mmol/L    Chloride 101 98 - 107 mmol/L    Bicarbonate 23 21 - 32 mmol/L    Anion Gap 13 10 - 20 mmol/L    Urea Nitrogen 24 (H) 6 - 23 mg/dL    Creatinine 1.08 0.50 - 1.30 mg/dL    eGFR 76 >60 mL/min/1.73m*2    Calcium 8.3 (L) 8.6 - 10.3 mg/dL   POCT GLUCOSE   Result Value Ref Range    POCT Glucose 157 (H) 74 - 99 mg/dL   POCT GLUCOSE   Result Value Ref Range    POCT Glucose 152 (H) 74 - 99 mg/dL   POCT GLUCOSE   Result Value Ref Range    POCT Glucose 192 (H) 74 - 99 mg/dL   POCT GLUCOSE   Result Value Ref Range    POCT Glucose 215 (H) 74 - 99 mg/dL   Basic metabolic panel   Result Value Ref Range    Glucose 216 (H) 74 - 99 mg/dL    Sodium 131 (L) 136 - 145 mmol/L    Potassium 3.6 3.5 - 5.3 mmol/L    Chloride 100 98 - 107 mmol/L    Bicarbonate 21 21 - 32 mmol/L    Anion Gap 14 10 - 20 mmol/L    Urea Nitrogen 22 6 - 23 mg/dL    Creatinine 1.08 0.50 - 1.30 mg/dL    eGFR 76 >60 mL/min/1.73m*2    Calcium 8.5 (L) 8.6 - 10.3 mg/dL   CBC and Auto Differential   Result Value Ref Range    WBC 11.2 4.4 - 11.3 x10*3/uL    nRBC 0.0 0.0 - 0.0 /100 WBCs    RBC 4.06 (L) 4.50 - 5.90 x10*6/uL    Hemoglobin 11.7 (L) 13.5 - 17.5 g/dL    Hematocrit 33.4 (L) 41.0 - 52.0 %    MCV 82 80 - 100 fL    MCH 28.8 26.0 -  34.0 pg    MCHC 35.0 32.0 - 36.0 g/dL    RDW 12.8 11.5 - 14.5 %    Platelets 185 150 - 450 x10*3/uL    Neutrophils % 81.6 40.0 - 80.0 %    Immature Granulocytes %, Automated 0.6 0.0 - 0.9 %    Lymphocytes % 8.8 13.0 - 44.0 %    Monocytes % 8.8 2.0 - 10.0 %    Eosinophils % 0.0 0.0 - 6.0 %    Basophils % 0.2 0.0 - 2.0 %    Neutrophils Absolute 9.13 (H) 1.20 - 7.70 x10*3/uL    Immature Granulocytes Absolute, Automated 0.07 0.00 - 0.70 x10*3/uL    Lymphocytes Absolute 0.99 (L) 1.20 - 4.80 x10*3/uL    Monocytes Absolute 0.98 0.10 - 1.00 x10*3/uL    Eosinophils Absolute 0.00 0.00 - 0.70 x10*3/uL    Basophils Absolute 0.02 0.00 - 0.10 x10*3/uL   Comprehensive Metabolic Panel   Result Value Ref Range    Glucose 216 (H) 74 - 99 mg/dL    Sodium 131 (L) 136 - 145 mmol/L    Potassium 3.6 3.5 - 5.3 mmol/L    Chloride 100 98 - 107 mmol/L    Bicarbonate 21 21 - 32 mmol/L    Anion Gap 14 10 - 20 mmol/L    Urea Nitrogen 22 6 - 23 mg/dL    Creatinine 1.08 0.50 - 1.30 mg/dL    eGFR 76 >60 mL/min/1.73m*2    Calcium 8.5 (L) 8.6 - 10.3 mg/dL    Albumin 3.8 3.4 - 5.0 g/dL    Alkaline Phosphatase 57 33 - 136 U/L    Total Protein 6.0 (L) 6.4 - 8.2 g/dL    AST 30 9 - 39 U/L    Bilirubin, Total 0.9 0.0 - 1.2 mg/dL    ALT 22 10 - 52 U/L   POCT GLUCOSE   Result Value Ref Range    POCT Glucose 230 (H) 74 - 99 mg/dL   POCT GLUCOSE   Result Value Ref Range    POCT Glucose 255 (H) 74 - 99 mg/dL   POCT GLUCOSE   Result Value Ref Range    POCT Glucose 257 (H) 74 - 99 mg/dL   Basic metabolic panel   Result Value Ref Range    Glucose 259 (H) 74 - 99 mg/dL    Sodium 132 (L) 136 - 145 mmol/L    Potassium 3.5 3.5 - 5.3 mmol/L    Chloride 102 98 - 107 mmol/L    Bicarbonate 20 (L) 21 - 32 mmol/L    Anion Gap 14 10 - 20 mmol/L    Urea Nitrogen 23 6 - 23 mg/dL    Creatinine 1.10 0.50 - 1.30 mg/dL    eGFR 74 >60 mL/min/1.73m*2    Calcium 8.3 (L) 8.6 - 10.3 mg/dL   Troponin I, High Sensitivity   Result Value Ref Range    Troponin I, High Sensitivity 321 (HH) 0  - 20 ng/L   POCT GLUCOSE   Result Value Ref Range    POCT Glucose 298 (H) 74 - 99 mg/dL   POCT GLUCOSE   Result Value Ref Range    POCT Glucose 235 (H) 74 - 99 mg/dL   POCT GLUCOSE   Result Value Ref Range    POCT Glucose 210 (H) 74 - 99 mg/dL   Basic metabolic panel   Result Value Ref Range    Glucose 153 (H) 74 - 99 mg/dL    Sodium 133 (L) 136 - 145 mmol/L    Potassium 3.3 (L) 3.5 - 5.3 mmol/L    Chloride 103 98 - 107 mmol/L    Bicarbonate 22 21 - 32 mmol/L    Anion Gap 11 10 - 20 mmol/L    Urea Nitrogen 21 6 - 23 mg/dL    Creatinine 1.05 0.50 - 1.30 mg/dL    eGFR 78 >60 mL/min/1.73m*2    Calcium 8.4 (L) 8.6 - 10.3 mg/dL   POCT GLUCOSE   Result Value Ref Range    POCT Glucose 68 (L) 74 - 99 mg/dL   POCT GLUCOSE   Result Value Ref Range    POCT Glucose 66 (L) 74 - 99 mg/dL   POCT GLUCOSE   Result Value Ref Range    POCT Glucose 82 74 - 99 mg/dL   Basic metabolic panel   Result Value Ref Range    Glucose 101 (H) 74 - 99 mg/dL    Sodium 132 (L) 136 - 145 mmol/L    Potassium 3.8 3.5 - 5.3 mmol/L    Chloride 103 98 - 107 mmol/L    Bicarbonate 21 21 - 32 mmol/L    Anion Gap 12 10 - 20 mmol/L    Urea Nitrogen 20 6 - 23 mg/dL    Creatinine 1.05 0.50 - 1.30 mg/dL    eGFR 78 >60 mL/min/1.73m*2    Calcium 8.5 (L) 8.6 - 10.3 mg/dL          Assessment/Plan     Urinary retention.      This may be multi-factorial ( BPH and/or diabetic bladder).   I will start him on Flomax.  Monitor voiding.  He will also benefit from timed voiding and double voiding.  His renal function is normal, and there is no hydronephrosis on imaging.       Call w/ any questions.        I spent 45 minutes in the professional and overall care of this patient.      Omkar Polk MD

## 2025-01-24 NOTE — PROGRESS NOTES
Occupational Therapy    Occupational Therapy Treatment    Name: Devin Gomez  MRN: [id number]  Department: [facility] 4 ICU  Room: [address] [place], [place] [address]A  Date: 01/24/25  Time Calculation  Start Time: 0945  Stop Time: 1010  Time Calculation (min): 25 min    Assessment:  OT Assessment: Follow-up OT treatment completed on this date; continued need for skilled OT services in order to address all established goals  Prognosis: Good  Barriers to Discharge Home: Caregiver assistance, Physical needs  Caregiver Assistance: Patient lives alone and/or does not have reliable caregiver assistance  Physical Needs: Intermittent mobility assistance needed, Intermittent ADL assistance needed  Evaluation/Treatment Tolerance: Patient limited by fatigue, Treatment limited secondary to medical complications (Comment)  Medical Staff Made Aware: Yes  End of Session Communication: Bedside nurse  End of Session Patient Position: Bed, 3 rail up, Alarm on  Plan:  Treatment Interventions: ADL retraining, Functional transfer training, UE strengthening/ROM, Endurance training, Cognitive reorientation, Patient/family training  OT Frequency: 3 times per week  OT Discharge Recommendations: Low intensity level of continued care  Equipment Recommended upon Discharge: Wheeled walker, Wheelchair  OT Recommended Transfer Status: Minimal assist  OT - OK to Discharge: Yes    Subjective   Previous Visit Info:  OT Last Visit  OT Received On: 01/24/25  General:  General  Reason for Referral: Decreased ADLs, hyperglycemia, pancolitis, n/v/d  Referred By: Dr. Knowles  Family/Caregiver Present: No  Prior to Session Communication: Bedside nurse  Patient Position Received: Bed, 3 rail up, Alarm on  Preferred Learning Style: verbal, visual, auditory  General Comment: Patient is being seen by skilled OT services on this date for follow-up OT treatment; patient demonstrating stady progress towards goal attainment, with continued skilled therapy services requried to address and meet  all goals.  Precautions:  Hearing/Visual Limitations: + glasses  Medical Precautions: Fall precautions, Infection precautions  Precautions Comment: contact plus/droplet, r/o noro.  troponins elevated, medical mgmt per cardiology 1/22    Pain Assessment:  Pain Assessment  Pain Assessment: 0-10  0-10 (Numeric) Pain Score: 0 - No pain     Objective   Cognition:  Overall Cognitive Status: Within Functional Limits  Arousal/Alertness: Appropriate responses to stimuli  Orientation Level: Oriented X4  Following Commands: Follows all commands and directions without difficulty  Attention: Within Functional Limits  Memory: Within Funtional Limits  Insight: Within function limits  Activities of Daily Living: Grooming  Grooming Level of Assistance: Setup  Grooming Where Assessed: Edge of bed  Grooming Comments: facial washing, hair washing, and hair management    UE Bathing  UE Bathing Level of Assistance: Contact guard  UE Bathing Where Assessed: Edge of bed  UE Bathing Comments: UB bathing from EOB sit, utilizing warm wash cloths; assist for posterior bathing needs    LE Bathing  LE Bathing Level of Assistance: Minimum assistance  LE Bathing Where Assessed: Edge of bed  LE Bathing Comments: LB bathing from EOB sit, utilizing warm wash cloths    UE Dressing  UE Dressing Level of Assistance: Contact guard  UE Dressing Where Assessed: Edge of bed  UE Dressing Comments: doff/khang gown from EOB sit with Min Assist    LE Dressing  LE Dressing: Yes  LE Dressing Where Assessed: Edge of bed  LE Dressing Comments: doff/khang bilateral socks from EOB sit    Functional Standing Tolerance:     Bed Mobility/Transfers: Bed Mobility  Bed Mobility: Yes  Bed Mobility 1  Bed Mobility 1: Supine to sitting, Sitting to supine  Level of Assistance 1: Close supervision  Bed Mobility Comments 1: HOB elevated    Transfers  Transfer: No (declined on this date d/t fatigue)    Sitting Balance:  Static Sitting Balance  Static Sitting-Balance Support: Feet  supported, Bilateral upper extremity supported  Static Sitting-Level of Assistance: Close supervision  Static Sitting-Comment/Number of Minutes: 20    Therapy/Activity: Therapeutic Exercise  Therapeutic Exercise Performed: No    Therapeutic Activity  Therapeutic Activity Performed: No  Sensory:  Sensory  Sensory Integration: No    Strength:  Strength  Strength Comments: BUEs 3/5 grossly  Other Activity:  Other Activity  Other Activity Performed: No    Outcome Measures:  [facility] Daily Activity  Putting on and taking off regular lower body clothing: A little  Bathing (including washing, rinsing, drying): A little  Putting on and taking off regular upper body clothing: A little  Toileting, which includes using toilet, bedpan or urinal: A lot  Taking care of personal grooming such as brushing teeth: A little  Eating Meals: A little  Daily Activity - Total Score: 17    Education Documentation  Body Mechanics, taught by Luis Amaya OT at 1/24/2025 11:00 AM.  Learner: Patient  Readiness: Acceptance  Method: Explanation, Demonstration  Response: Verbalizes Understanding, Needs Reinforcement, Demonstrated Understanding    Precautions, taught by Luis Amaya OT at 1/24/2025 11:00 AM.  Learner: Patient  Readiness: Acceptance  Method: Explanation, Demonstration  Response: Verbalizes Understanding, Needs Reinforcement, Demonstrated Understanding    ADL Training, taught by Luis Amaya OT at 1/24/2025 11:00 AM.  Learner: Patient  Readiness: Acceptance  Method: Explanation, Demonstration  Response: Verbalizes Understanding, Needs Reinforcement, Demonstrated Understanding    Education Comments  No comments found.      Goals:  Encounter Problems       Encounter Problems (Active)       OT Goals       ADLS (Progressing)       Start:  01/22/25    Expected End:  02/05/25       Patient will complete ADL tasks, with modified independence using AE need in order to increase patient's safety and independence with self-care tasks.            Functional Transfers (Progressing)       Start:  01/22/25    Expected End:  02/05/25       Patient will complete functional transfers with modified independence using least restrictive device, in order to increase patient's safety and independence with daily tasks.           Activity Tolerance (Progressing)       Start:  01/22/25    Expected End:  02/05/25       Patient will demonstrate the ability to participate in functional activity at least >/= 20 minutes in order to increase patient's safety and independence with daily tasks.

## 2025-01-24 NOTE — PROGRESS NOTES
Devin Gomez is a 64 y.o. male on day 3 of admission presenting with Dehydration.      Subjective   He has tolerated full liquid diet   There is no abdominal pain no diarrhea no vomiting no chest pain  Overall improved    Reviewed urology input appreciated    Reviewed GI input appreciated    Cardiologist follow-up with patient today  Objective     Last Recorded Vitals  /67   Pulse 82   Temp 36.7 °C (98 °F)   Resp 12   Wt 61 kg (134 lb 7.7 oz)   SpO2 97%   Intake/Output last 3 Shifts:    Intake/Output Summary (Last 24 hours) at 1/24/2025 1203  Last data filed at 1/24/2025 0956  Gross per 24 hour   Intake 837.5 ml   Output 949 ml   Net -111.5 ml       Physical Exam  Alert oriented x 3 cooperative no distress  Chest clear  Heart regular  Abdomen soft nontender  Extremities no edema  Neurologic exam focal  Relevant Results  Reviewed  Assessment/Plan     Assessment & Plan  Dehydration  Pancolitis  Complaining of stomach cramping  Abdomen pelvis showed pancolitis  Elevated lactate  IV fluids half-normal saline at 150 an hour  Clear liquid diet  Stool panel  Zosyn  Consult gastroenterology  Type 2 diabetes mellitus with hyperglycemia  Blood glucose 283 and will give 5 units IV insulin  Check ABG  Check blood glucose every 2 hours  Check BMP every 4 hours  High blood pressure without diagnosis of hypertension  Hydralazine for systolic blood pressure greater than 160  Diffuse abdominal aorta plaque  Consider vascular consult outpatient  Urinary bladder distention  Bladder scan for over 1100  Patient was straight cath  Will do bladder scans every 6 hours and straight cath for residual greater than 350 mL  DVT prophylaxis  Lovenox  NSTEMI (non-ST elevated myocardial infarction) (Multi)    Diabetic ketoacidosis without coma associated with type 2 diabetes mellitus (Multi)    Pancolitis (Multi)    Vomiting    Bladder retention    Ambulatory dysfunction      January 24,    Nausea has improved.  Repeat imaging does  not really offer an explanation.  Will continue empiric Protonix advised to advance diet small meals discussed with him.  Colonoscopy plans for EGD plans per GI if still here on Monday    Question NSTEMI cardiology to follow-up and determine need for cardiac catheterization he has no chest pain troponin was downtrending already    Ambulatory dysfunction he normally walks with a walker we will try to get him up with PT    Bladder retention is not particularly new appreciate input from urology, patient still refusing Villegas but he is okay with intermittent straight catheterization and he was started on Flomax    Will discontinue isolation as he has had no diarrhea whatsoever             Ismael Knowles MD

## 2025-01-24 NOTE — PROGRESS NOTES
01/24/25 1629   Discharge Planning   Living Arrangements Alone   Support Systems Friends/neighbors   Assistance Needed None   Type of Residence Private residence   Number of Stairs to Enter Residence 0   Number of Stairs Within Residence 0   Home or Post Acute Services In home services  (Patient refusing SNF.  Open to [facility] at d/c.  INTERNAL referral needed at d/c.  (Patient had been to [facility] in the past).  Care Transitions following for updates/changes to discharge needs/plan.)   Type of Home Care Services Home OT;Home PT   Expected Discharge Disposition Home H   Does the patient need discharge transport arranged? No   Financial Resource Strain   How hard is it for you to pay for the very basics like food, housing, medical care, and heating? Not very   Housing Stability   In the last 12 months, was there a time when you were not able to pay the mortgage or rent on time? N   At any time in the past 12 months, were you homeless or living in a shelter (including now)? N   Transportation Needs   In the past 12 months, has lack of transportation kept you from medical appointments or from getting medications? no   In the past 12 months, has lack of transportation kept you from meetings, work, or from getting things needed for daily living? No

## 2025-01-24 NOTE — PROGRESS NOTES
Subjective Data:  Patient is doing better.  Still feeling slightly nauseous.  Still having abdominal pain.    Overnight Events:    Telemetry overnight reviewed no events     Objective Data:  Last Recorded Vitals:  Vitals:    01/24/25 0737 01/24/25 0915 01/24/25 1200 01/24/25 1253   BP: 123/65  124/67 109/60   BP Location:       Patient Position:       Pulse:  95 82 79   Resp:   12    Temp: 36.6 °C (97.9 °F)  36.7 °C (98 °F)    TempSrc:       SpO2:   97% 97%   Weight: 61 kg (134 lb 7.7 oz)      Height:           Last Labs:  CBC - 1/24/2025:  5:30 AM  8.8 12.2 192    36.2      CMP - 1/24/2025: 12:06 PM  8.1 5.9 39 --- 0.9   2.3 3.7 23 57      PTT - 1/22/2025:  6:15 AM  _   _ 24.9     TROPHS   Date/Time Value Ref Range Status   01/23/2025 07:54  0 - 20 ng/L Final   01/22/2025 01:29  0 - 20 ng/L Final     Comment:     Previous result verified on 1/21/2025 2211 on specimen/case [id number] called with component TRPHS for procedure Troponin I, High Sensitivity with value 59 ng/L.   01/22/2025 12:11  0 - 20 ng/L Final     Comment:     Previous result verified on 1/21/2025 2211 on specimen/case [id number] called with component TRPHS for procedure Troponin I, High Sensitivity with value 59 ng/L.     BNP   Date/Time Value Ref Range Status   01/22/2025 12:11  0 - 99 pg/mL Final   01/21/2025 02:03  0 - 99 pg/mL Final     HGBA1C   Date/Time Value Ref Range Status   01/21/2025 08:22 PM 10.2 See comment % Final   03/18/2021 02:00 PM 8.9 4.0 - 6.0 % Final     Comment:     Hemoglobin A1C levels are related to mean blood glucose during the   preceding 2-3 months. The relationship table below may be used as a   general guide. Each 1% increase in HGB A1C is a reflection of an   increase in mean glucose of approximately 30 mg/dl.   Reference: Diabetes Care, volume 29, supplement 1 Jan. 2006                        HGB A1C ................. Approx. Mean Glucose    _______________________________________________   6%   ...............................  120 mg/dl   7%   ...............................  150 mg/dl   8%   ...............................  180 mg/dl   9%   ...............................  210 mg/dl   10%  ...............................  240 mg/dl  Performed at [address] [address]     10/30/2020 09:02 PM 8.3 4.0 - 6.0 % Final     Comment:     Hemoglobin A1C levels are related to mean blood glucose during the   preceding 2-3 months. The relationship table below may be used as a   general guide. Each 1% increase in HGB A1C is a reflection of an   increase in mean glucose of approximately 30 mg/dl.   Reference: Diabetes Care, volume 29, supplement 1 Jan. 2006                        HGB A1C ................. Approx. Mean Glucose   _______________________________________________   6%   ...............................  120 mg/dl   7%   ...............................  150 mg/dl   8%   ...............................  180 mg/dl   9%   ...............................  210 mg/dl   10%  ...............................  240 mg/dl  Performed at [address] [address]       LDLCALC   Date/Time Value Ref Range Status   07/31/2019 08:45 AM 55 65 - 130 MG/DL Final      Last I/O:  I/O last 3 completed shifts:  In: 3113.1 (51 mL/kg) [I.V.:2412.1 (39.5 mL/kg); IV Piggyback:701]  Out: 1999 (32.7 mL/kg) [Urine:1999 (0.9 mL/kg/hr)]  Weight: 61.1 kg     Past Cardiology Tests (Last 3 Years):  EKG:  ECG 12 lead 01/22/2025    Echo:  Transthoracic Echo (TTE) Complete 01/22/2025    Ejection Fractions:  EF   Date/Time Value Ref Range Status   01/22/2025 09:49 AM 63 %      Cath:  No results found for this or any previous visit from the past 1095 days.    Stress Test:  No results found for this or any previous visit from the past 1095 days.    Cardiac Imaging:  No results found for this or any previous visit from the past 1095 days.      Inpatient  Medications:  Scheduled medications   Medication Dose Route Frequency    aspirin  81 mg oral Daily    heparin  5,000 Units subcutaneous q8h    insulin glargine  15 Units subcutaneous q24h    insulin lispro  0-15 Units subcutaneous TID AC    metoclopramide  10 mg intravenous q6h EUFEMIA    metoprolol tartrate  25 mg oral BID    pantoprazole  40 mg intravenous BID    piperacillin-tazobactam  3.375 g intravenous q6h    polyethylene glycol  17 g oral TID    tamsulosin  0.4 mg oral Daily before breakfast     PRN medications   Medication    acetaminophen    Or    acetaminophen    Or    acetaminophen    dextrose    dextrose    dextrose    glucagon    glucagon    heparin (porcine)    hydrALAZINE    melatonin    ondansetron ODT    Or    ondansetron     Continuous Medications   Medication Dose Last Rate       Physical Exam:  General: Patient is in no acute distress.  HEENT: atraumatic normocephalic.  Neck: is supple jugular venous pressure within normal limits no thyromegaly.  Cardiovascular regular rate and rhythm normal heart sounds no murmurs rubs or gallops.  Lungs: clear to auscultation bilaterally.  Abdomen: is soft mild tenderness to palpation.  No guarding or rebound.  Extremities warm to touch no edema.  Neurologic examination: patient is awake alert oriented to person, place, date/time.  Psychiatric examination: patient has good insight denies feeling suicidal and depressed.  Pulses 2+ intact bilaterally        Assessment/Plan  1 elevated troponin.  Patient with history of diabetes hypertension hyperlipidemia and peripheral vascular disease admitted to the hospital with weakness nausea vomiting severe abdominal pain and diarrhea.  Abdominal CT showed pancolitis.  Troponin mildly elevated.  EKG within normal limit.  Patient has no chest pain.  Whether nausea is related to acute coronary syndrome I do believe is less likely most likely this is secondary to his pancolitis.  Patient improving overall.  Troponin trending  down.  However his troponin peaked at 500.  EKG with nonspecific changes.  I think it is reasonable to proceed with ischemic workup further with stress test or cardiac catheterization by Monday.  Pancolitis is improving.  Continue aspirin statin.  Dr. Lake will follow-up in a.m.     2.  Peripheral vascular disease.  Recommend baby aspirin high intensity statin once he is more stable.  Control of blood pressure.     3.  Diabetes mellitus.  Patient admitted with DKA picture likely secondary to pancolitis.  Blood sugars improving.  Anion gap closing.  Will monitor.  Management by primary team.     4.  Hypertension.  Blood pressure and heart rate well-controlled.  Will monitor.  5.  Hyperlipidemia.  Recommend to start high intensity statin once he is more stable.  Peripheral IV 01/21/25 20 G Right;Dorsal Wrist (Active)   Site Assessment Clean;Dry;Intact 01/24/25 0756   Dressing Status Clean;Dry;Occlusive 01/24/25 0756   Number of days: 3       Peripheral IV 01/22/25 20 G Right;Anterior Forearm (Active)   Site Assessment Clean;Dry;Intact 01/24/25 0756   Dressing Status Clean;Dry;Occlusive 01/24/25 0756   Number of days: 2       Peripheral IV 01/23/25 20 G Left;Anterior Forearm (Active)   Site Assessment Clean;Dry;Intact 01/24/25 0756   Dressing Status Clean;Dry;Occlusive 01/24/25 0756   Number of days: 1       Code Status:  Full Code        Hubert Vidales MD

## 2025-01-24 NOTE — CARE PLAN
The clinical goals for the shift include nausea control. get insulin gtt off    Problem: Diabetes  Goal: Achieve decreasing blood glucose levels by end of shift  Outcome: Progressing  Goal: Increase stability of blood glucose readings by end of shift  Outcome: Progressing  Goal: Decrease in ketones present in urine by end of shift  Outcome: Progressing  Goal: Maintain electrolyte levels within acceptable range throughout shift  Outcome: Progressing  Goal: Maintain glucose levels >70mg/dl to <250mg/dl throughout shift  Outcome: Progressing  Goal: No changes in neurological exam by end of shift  Outcome: Progressing  Goal: Learn about and adhere to nutrition recommendations by end of shift  Outcome: Progressing  Goal: Vital signs within normal range for age by end of shift  Outcome: Progressing  Goal: Increase self care and/or family involovement by end of shift  Outcome: Progressing  Goal: Receive DSME education by end of shift  Outcome: Progressing     Problem: Pain - Adult  Goal: Verbalizes/displays adequate comfort level or baseline comfort level  Outcome: Progressing     Problem: Safety - Adult  Goal: Free from fall injury  Outcome: Progressing     Problem: Discharge Planning  Goal: Discharge to home or other facility with appropriate resources  Outcome: Progressing     Problem: Chronic Conditions and Co-morbidities  Goal: Patient's chronic conditions and co-morbidity symptoms are monitored and maintained or improved  Outcome: Progressing     Problem: Fall/Injury  Goal: Not fall by end of shift  Outcome: Progressing  Goal: Be free from injury by end of the shift  Outcome: Progressing  Goal: Verbalize understanding of personal risk factors for fall in the hospital  Outcome: Progressing  Goal: Verbalize understanding of risk factor reduction measures to prevent injury from fall in the home  Outcome: Progressing  Goal: Use assistive devices by end of the shift  Outcome: Progressing  Goal: Pace activities to prevent  fatigue by end of the shift  Outcome: Progressing     Problem: Pain  Goal: Takes deep breaths with improved pain control throughout the shift  Outcome: Progressing  Goal: Turns in bed with improved pain control throughout the shift  Outcome: Progressing  Goal: Walks with improved pain control throughout the shift  Outcome: Progressing  Goal: Performs ADL's with improved pain control throughout shift  Outcome: Progressing  Goal: Participates in PT with improved pain control throughout the shift  Outcome: Progressing  Goal: Free from opioid side effects throughout the shift  Outcome: Progressing  Goal: Free from acute confusion related to pain meds throughout the shift  Outcome: Progressing

## 2025-01-24 NOTE — PROGRESS NOTES
"Devin Gomez is a 64 y.o. male on day 3 of admission presenting with Dehydration.    Subjective   Still having some nausea. Denies abdominal pain. Denies bloody stools       Objective     Physical Exam  HENT:      Head: Normocephalic.      Nose: Nose normal.      Mouth/Throat:      Mouth: Mucous membranes are moist.   Eyes:      Pupils: Pupils are equal, round, and reactive to light.   Cardiovascular:      Rate and Rhythm: Regular rhythm.   Pulmonary:      Breath sounds: Normal breath sounds.   Abdominal:      Palpations: Abdomen is soft.   Musculoskeletal:      Cervical back: Normal range of motion.   Skin:     General: Skin is warm.   Neurological:      General: No focal deficit present.      Mental Status: He is alert.         Last Recorded Vitals  Blood pressure 123/65, pulse 57, temperature 36.6 °C (97.9 °F), resp. rate 18, height 1.753 m (5' 9\"), weight 61 kg (134 lb 7.7 oz), SpO2 96%.  Intake/Output last 3 Shifts:  I/O last 3 completed shifts:  In: 3113.1 (51 mL/kg) [I.V.:2412.1 (39.5 mL/kg); IV Piggyback:701]  Out: 1999 (32.7 mL/kg) [Urine:1999 (0.9 mL/kg/hr)]  Weight: 61.1 kg     Relevant Results      Scheduled medications  aspirin, 81 mg, oral, Daily  heparin, 5,000 Units, subcutaneous, q8h  insulin glargine, 15 Units, subcutaneous, q24h  insulin lispro, 0-15 Units, subcutaneous, TID AC  metoclopramide, 10 mg, intravenous, q6h EUFEMIA  metoprolol tartrate, 25 mg, oral, BID  pantoprazole, 40 mg, intravenous, BID  piperacillin-tazobactam, 3.375 g, intravenous, q6h  polyethylene glycol, 17 g, oral, TID  tamsulosin, 0.4 mg, oral, Daily before breakfast      Continuous medications       PRN medications  PRN medications: acetaminophen **OR** acetaminophen **OR** acetaminophen, dextrose, dextrose, dextrose, glucagon, glucagon, heparin (porcine), hydrALAZINE, melatonin, ondansetron ODT **OR** ondansetron  Results for orders placed or performed during the hospital encounter of 01/21/25 (from the past 24 hours) "   POCT GLUCOSE   Result Value Ref Range    POCT Glucose 235 (H) 74 - 99 mg/dL   POCT GLUCOSE   Result Value Ref Range    POCT Glucose 210 (H) 74 - 99 mg/dL   Basic metabolic panel   Result Value Ref Range    Glucose 153 (H) 74 - 99 mg/dL    Sodium 133 (L) 136 - 145 mmol/L    Potassium 3.3 (L) 3.5 - 5.3 mmol/L    Chloride 103 98 - 107 mmol/L    Bicarbonate 22 21 - 32 mmol/L    Anion Gap 11 10 - 20 mmol/L    Urea Nitrogen 21 6 - 23 mg/dL    Creatinine 1.05 0.50 - 1.30 mg/dL    eGFR 78 >60 mL/min/1.73m*2    Calcium 8.4 (L) 8.6 - 10.3 mg/dL   POCT GLUCOSE   Result Value Ref Range    POCT Glucose 68 (L) 74 - 99 mg/dL   POCT GLUCOSE   Result Value Ref Range    POCT Glucose 66 (L) 74 - 99 mg/dL   POCT GLUCOSE   Result Value Ref Range    POCT Glucose 82 74 - 99 mg/dL   Basic metabolic panel   Result Value Ref Range    Glucose 101 (H) 74 - 99 mg/dL    Sodium 132 (L) 136 - 145 mmol/L    Potassium 3.8 3.5 - 5.3 mmol/L    Chloride 103 98 - 107 mmol/L    Bicarbonate 21 21 - 32 mmol/L    Anion Gap 12 10 - 20 mmol/L    Urea Nitrogen 20 6 - 23 mg/dL    Creatinine 1.05 0.50 - 1.30 mg/dL    eGFR 78 >60 mL/min/1.73m*2    Calcium 8.5 (L) 8.6 - 10.3 mg/dL   POCT GLUCOSE   Result Value Ref Range    POCT Glucose 122 (H) 74 - 99 mg/dL   CBC and Auto Differential   Result Value Ref Range    WBC 8.8 4.4 - 11.3 x10*3/uL    nRBC 0.0 0.0 - 0.0 /100 WBCs    RBC 4.29 (L) 4.50 - 5.90 x10*6/uL    Hemoglobin 12.2 (L) 13.5 - 17.5 g/dL    Hematocrit 36.2 (L) 41.0 - 52.0 %    MCV 84 80 - 100 fL    MCH 28.4 26.0 - 34.0 pg    MCHC 33.7 32.0 - 36.0 g/dL    RDW 13.2 11.5 - 14.5 %    Platelets 192 150 - 450 x10*3/uL    Neutrophils % 76.8 40.0 - 80.0 %    Immature Granulocytes %, Automated 0.5 0.0 - 0.9 %    Lymphocytes % 13.2 13.0 - 44.0 %    Monocytes % 9.1 2.0 - 10.0 %    Eosinophils % 0.1 0.0 - 6.0 %    Basophils % 0.3 0.0 - 2.0 %    Neutrophils Absolute 6.76 1.20 - 7.70 x10*3/uL    Immature Granulocytes Absolute, Automated 0.04 0.00 - 0.70 x10*3/uL     Lymphocytes Absolute 1.16 (L) 1.20 - 4.80 x10*3/uL    Monocytes Absolute 0.80 0.10 - 1.00 x10*3/uL    Eosinophils Absolute 0.01 0.00 - 0.70 x10*3/uL    Basophils Absolute 0.03 0.00 - 0.10 x10*3/uL   Comprehensive Metabolic Panel   Result Value Ref Range    Glucose 115 (H) 74 - 99 mg/dL    Sodium 134 (L) 136 - 145 mmol/L    Potassium 3.5 3.5 - 5.3 mmol/L    Chloride 103 98 - 107 mmol/L    Bicarbonate 22 21 - 32 mmol/L    Anion Gap 13 10 - 20 mmol/L    Urea Nitrogen 19 6 - 23 mg/dL    Creatinine 1.10 0.50 - 1.30 mg/dL    eGFR 74 >60 mL/min/1.73m*2    Calcium 8.4 (L) 8.6 - 10.3 mg/dL    Albumin 3.7 3.4 - 5.0 g/dL    Alkaline Phosphatase 57 33 - 136 U/L    Total Protein 5.9 (L) 6.4 - 8.2 g/dL    AST 39 9 - 39 U/L    Bilirubin, Total 0.9 0.0 - 1.2 mg/dL    ALT 23 10 - 52 U/L   Basic metabolic panel   Result Value Ref Range    Glucose 127 (H) 74 - 99 mg/dL    Sodium 134 (L) 136 - 145 mmol/L    Potassium 3.5 3.5 - 5.3 mmol/L    Chloride 102 98 - 107 mmol/L    Bicarbonate 21 21 - 32 mmol/L    Anion Gap 15 10 - 20 mmol/L    Urea Nitrogen 21 6 - 23 mg/dL    Creatinine 1.09 0.50 - 1.30 mg/dL    eGFR 75 >60 mL/min/1.73m*2    Calcium 8.4 (L) 8.6 - 10.3 mg/dL          This patient currently has cardiac telemetry ordered; if you would like to modify or discontinue the telemetry order, click here to go to the orders activity to modify/discontinue the order.                 Assessment/Plan   Assessment & Plan  Dehydration    NSTEMI (non-ST elevated myocardial infarction) (Multi)    Diabetic ketoacidosis without coma associated with type 2 diabetes mellitus (Multi)    Pancolitis (Multi)    Vomiting    Bladder retention    Abdominal pain/Abnormal CT  Patient presented with abdominal cramping, nausea vomiting.  Was found to be in borderline DKA with dehydration. Patient reports after receiving IV fluid with improved control of blood sugar is feeling overall better. Due to CT findings of pancolitis, recommend colonoscopy at some point  to further evaluate. Defer timing to attending. Will order inflammatory markers.  Stool studies have been ordered.  At this time we will continue antibiotics as ordered. Will discuss further recommendation with Dr. Ham.      1/23  Patient continues to feel nauseous, however no vomiting reported.  Abdominal pain has improved. Patient does have a increased risk for gastroparesis.  Will trial scheduled Reglan and advance diet to clears today.  If no improvement, can consider EGD tomorrow.  Patient CRP was normal.  Patient remains afebrile, no leukocytosis. He is not having any diarrhea.  Will plan for outpatient colonoscopy.     1/24  Pending stool results, we will consider EGD/colonoscopy on Monday. Pending fecal calprotectin. Continue supportive care    Ally Ratliff, APRN-CNP

## 2025-01-24 NOTE — PROGRESS NOTES
"Physical Therapy    Physical Therapy Treatment    Patient Name: Devin Gomez  MRN: [id number]  Department: [facility] 4 ICU  Room: 407/[facility]  Today's Date: 1/24/2025  Time Calculation  Start Time: 1253  Stop Time: 1346  Time Calculation (min): 53 min         Assessment/Plan   PT Assessment  PT Assessment Results: Decreased strength, Decreased endurance, Impaired balance, Decreased mobility, Decreased coordination, Decreased safety awareness  Rehab Prognosis: Fair  Barriers to Discharge Home: Physical needs, Caregiver assistance  Caregiver Assistance: Patient lives alone and/or does not have reliable caregiver assistance  Physical Needs: Intermittent mobility assistance needed  Evaluation/Treatment Tolerance: Patient limited by fatigue  Medical Staff Made Aware: Yes  End of Session Communication: Bedside nurse  End of Session Patient Position: Up in chair, Alarm on      Assessment Comment: Pt with improved reports of nausea this date. Willing to get OOB and participate with therapist. Tolerated transfer to chair with supervision, similar to wc transfers at baseline. PT and pt discussed home set-up and prior level of function. Pt reportedly uses wc to \"protect my heels\" due to ulcers and h/o diabetes. Able to ambulate with std walker and wt shifted anteriorly into forefeet. This date, attempted amb with FWW, unsafe. Transitioned to std walker, in which pt tolerated about 10 ft, however required multiple seated rest breaks due to fatigue. At this time, unsafe to return home alone. pt agreeable to further therapy to maximize safe mobility. Pt would  benefit from post acute PT to maximize safe, indep mobility.       PT Plan  Treatment/Interventions: Bed mobility, Transfer training, Gait training, Balance training, Neuromuscular re-education, Strengthening, Endurance training, Therapeutic activity, Postural re-education  PT Plan: Ongoing PT  PT Frequency: 3 times per week  PT Discharge Recommendations: Moderate intensity level " of continued care  Equipment Recommended upon Discharge:  (n/a)  PT Recommended Transfer Status: Assist x1, Assistive device  PT - OK to Discharge: Yes      General Visit Information:   PT  Visit  PT Received On: 01/24/25  General  Reason for Referral: Impaired mobility; hyperglycemia, pancolitis, n/v/d  Past Medical History Relevant to Rehab: DM with neuropathy, PVD, HTN, hyperlipidemia  Family/Caregiver Present: No  Prior to Session Communication: Bedside nurse  Patient Position Received: Bed, 3 rail up, Alarm on  Preferred Learning Style: verbal, visual, auditory  General Comment: Cleared for mobility per nursing. Pt supine in bed upon arrival. Flat affect, agreeable to participate. PIV, tele.    Subjective   Precautions:  Precautions  Hearing/Visual Limitations: + glasses  Medical Precautions: Fall precautions  Precautions Comment: no longer on isolation, per RN        Objective   Pain:  Pain Assessment  Pain Assessment: 0-10  0-10 (Numeric) Pain Score: 0 - No pain  Cognition:  Cognition  Orientation Level: Oriented X4  Cognition Comments: Flat affect. Able to follow commands.    Postural Control:  Postural Control  Posture Comment: rounded shoulders    Activity Tolerance:  Activity Tolerance  Endurance: Decreased tolerance for upright activites  Activity Tolerance Comments: fatigues easily, requires multiple rest breaks.  Treatments:       Therapeutic Activity  Therapeutic Activity Performed: Yes  Therapeutic Activity 1: Sat on EOB with supervision while discussing baseline mobility and transfers. Performed multiple transfers, sit to stands and short ambulation trials during session.    Bed Mobility 1  Bed Mobility 1: Supine to sitting  Level of Assistance 1: Close supervision  Bed Mobility Comments 1: HOB elevated    Ambulation/Gait Training  Ambulation/Gait Training Performed: Yes  Ambulation/Gait Training 1  Surface 1: Level tile  Device 1: Standard walker  Gait Support Devices: Wheelchair follow  Assistance  "1: Minimum assistance  Quality of Gait 1:  (decreased ashley, minimal foot clearance,  walks primarily on forefoot of feet (pt reports is baseline due to \"protect my heels\"). Walker far out anteriorly in front of patient. Pt behind parameter of walker.)  Comments/Distance (ft) 1: about 10 ft, 5 ft x 2, seated rest breaks in between trials    Transfers  Transfer: Yes  Transfer 1  Transfer From 1: Bed to  Transfer to 1: Chair with arms  Technique 1: Squat pivot  Transfer Level of Assistance 1: Close supervision  Trials/Comments 1: Chair positioned in front of patient (similar to how patient positions own wc at home). Performed squat pivot transfer with B UE support holding onto arm rests of chair. Supervision.  Transfers 2  Technique 2: Sit to stand  Transfer Device 2: Walker  Transfer Level of Assistance 2: Minimum assistance  Trials/Comments 2: x5 trials from low chair with arms  Transfers 3  Technique 3: Stand to sit  Transfer Device 3: Walker  Transfer Level of Assistance 3: Minimum assistance  Trials/Comments 3: onto chair with arms         Outcome Measures:  [facility] Basic Mobility  Turning from your back to your side while in a flat bed without using bedrails: A little  Moving from lying on your back to sitting on the side of a flat bed without using bedrails: A little  Moving to and from bed to chair (including a wheelchair): A little  Standing up from a chair using your arms (e.g. wheelchair or bedside chair): A little  To walk in hospital room: A lot  Climbing 3-5 steps with railing: Total  Basic Mobility - Total Score: 15    Education Documentation  Precautions, taught by Nina Walker PT at 1/24/2025  2:50 PM.  Learner: Patient  Readiness: Acceptance  Method: Explanation  Response: Needs Reinforcement  Comment: Continued PT POC, dc planning, home set-up and safety, pt independent at baseline.    Body Mechanics, taught by Nina Walker PT at 1/24/2025  2:50 PM.  Learner: Patient  Readiness: Acceptance  Method: " Explanation  Response: Needs Reinforcement  Comment: Continued PT POC, dc planning, home set-up and safety, pt independent at baseline.    Mobility Training, taught by Nina Walker, PT at 1/24/2025  2:50 PM.  Learner: Patient  Readiness: Acceptance  Method: Explanation  Response: Needs Reinforcement  Comment: Continued PT POC, dc planning, home set-up and safety, pt independent at baseline.    Education Comments  No comments found.        OP EDUCATION:       Encounter Problems       Encounter Problems (Active)       Balance       dynamic (Progressing)       Start:  01/22/25    Expected End:  02/05/25       Pt will perform Tinetti assessment and score >/= 24/28, resulting in a low falls risk             Mobility       LTG - Patient will be able to go up and down a curb/step with the appropriate device (Progressing)       Start:  01/22/25    Expected End:  02/05/25            LTG - Patient will navigate 4-6 steps with rails/device (Progressing)       Start:  01/22/25    Expected End:  02/05/25            ambulation (Progressing)       Start:  01/22/25    Expected End:  02/05/25       Pt will amb > 100 ft with wheeled walker/LRAD and mod independence          endurance (Progressing)       Start:  01/22/25    Expected End:  02/05/25       Pt will tolerate > 20 minutes of activity with stable vital signs and decreased c/o SOB/nausea.            PT Transfers       sit to stand (Progressing)       Start:  01/22/25    Expected End:  02/05/25       Pt will perform sit to stand transfer with LRAD and mod independence.             Pain - Adult          Safety       LTG - Patient will utilize safety techniques (Progressing)       Start:  01/22/25    Expected End:  02/05/25

## 2025-01-25 LAB
ALBUMIN SERPL BCP-MCNC: 3.5 G/DL (ref 3.4–5)
ALP SERPL-CCNC: 45 U/L (ref 33–136)
ALT SERPL W P-5'-P-CCNC: 25 U/L (ref 10–52)
ANION GAP SERPL CALCULATED.3IONS-SCNC: 11 MMOL/L (ref 10–20)
ANION GAP SERPL CALCULATED.3IONS-SCNC: 12 MMOL/L (ref 10–20)
AST SERPL W P-5'-P-CCNC: 41 U/L (ref 9–39)
BASOPHILS # BLD AUTO: 0.03 X10*3/UL (ref 0–0.1)
BASOPHILS NFR BLD AUTO: 0.5 %
BILIRUB SERPL-MCNC: 1.1 MG/DL (ref 0–1.2)
BUN SERPL-MCNC: 23 MG/DL (ref 6–23)
BUN SERPL-MCNC: 23 MG/DL (ref 6–23)
C COLI+JEJ+UPSA DNA STL QL NAA+PROBE: NOT DETECTED
CALCIUM SERPL-MCNC: 8.1 MG/DL (ref 8.6–10.3)
CALCIUM SERPL-MCNC: 8.2 MG/DL (ref 8.6–10.3)
CHLORIDE SERPL-SCNC: 101 MMOL/L (ref 98–107)
CHLORIDE SERPL-SCNC: 102 MMOL/L (ref 98–107)
CO2 SERPL-SCNC: 23 MMOL/L (ref 21–32)
CO2 SERPL-SCNC: 25 MMOL/L (ref 21–32)
CREAT SERPL-MCNC: 1.14 MG/DL (ref 0.5–1.3)
CREAT SERPL-MCNC: 1.2 MG/DL (ref 0.5–1.3)
EC STX1 GENE STL QL NAA+PROBE: NOT DETECTED
EC STX2 GENE STL QL NAA+PROBE: NOT DETECTED
EGFRCR SERPLBLD CKD-EPI 2021: 67 ML/MIN/1.73M*2
EGFRCR SERPLBLD CKD-EPI 2021: 71 ML/MIN/1.73M*2
EOSINOPHIL # BLD AUTO: 0.02 X10*3/UL (ref 0–0.7)
EOSINOPHIL NFR BLD AUTO: 0.4 %
ERYTHROCYTE [DISTWIDTH] IN BLOOD BY AUTOMATED COUNT: 12.7 % (ref 11.5–14.5)
GLUCOSE BLD MANUAL STRIP-MCNC: 115 MG/DL (ref 74–99)
GLUCOSE BLD MANUAL STRIP-MCNC: 208 MG/DL (ref 74–99)
GLUCOSE BLD MANUAL STRIP-MCNC: 61 MG/DL (ref 74–99)
GLUCOSE BLD MANUAL STRIP-MCNC: 94 MG/DL (ref 74–99)
GLUCOSE BLD MANUAL STRIP-MCNC: 98 MG/DL (ref 74–99)
GLUCOSE SERPL-MCNC: 102 MG/DL (ref 74–99)
GLUCOSE SERPL-MCNC: 99 MG/DL (ref 74–99)
HCT VFR BLD AUTO: 31.2 % (ref 41–52)
HGB BLD-MCNC: 10.7 G/DL (ref 13.5–17.5)
IMM GRANULOCYTES # BLD AUTO: 0.01 X10*3/UL (ref 0–0.7)
IMM GRANULOCYTES NFR BLD AUTO: 0.2 % (ref 0–0.9)
LYMPHOCYTES # BLD AUTO: 1.39 X10*3/UL (ref 1.2–4.8)
LYMPHOCYTES NFR BLD AUTO: 25.1 %
MCH RBC QN AUTO: 28.5 PG (ref 26–34)
MCHC RBC AUTO-ENTMCNC: 34.3 G/DL (ref 32–36)
MCV RBC AUTO: 83 FL (ref 80–100)
MONOCYTES # BLD AUTO: 0.7 X10*3/UL (ref 0.1–1)
MONOCYTES NFR BLD AUTO: 12.6 %
NEUTROPHILS # BLD AUTO: 3.39 X10*3/UL (ref 1.2–7.7)
NEUTROPHILS NFR BLD AUTO: 61.2 %
NOROVIRUS GI + GII RNA STL NAA+PROBE: NOT DETECTED
NRBC BLD-RTO: 0 /100 WBCS (ref 0–0)
PLATELET # BLD AUTO: 164 X10*3/UL (ref 150–450)
POTASSIUM SERPL-SCNC: 3.4 MMOL/L (ref 3.5–5.3)
POTASSIUM SERPL-SCNC: 3.4 MMOL/L (ref 3.5–5.3)
PROT SERPL-MCNC: 5.6 G/DL (ref 6.4–8.2)
RBC # BLD AUTO: 3.75 X10*6/UL (ref 4.5–5.9)
RV RNA STL NAA+PROBE: NOT DETECTED
SALMONELLA DNA STL QL NAA+PROBE: NOT DETECTED
SHIGELLA DNA SPEC QL NAA+PROBE: NOT DETECTED
SODIUM SERPL-SCNC: 134 MMOL/L (ref 136–145)
SODIUM SERPL-SCNC: 134 MMOL/L (ref 136–145)
V CHOLERAE DNA STL QL NAA+PROBE: NOT DETECTED
WBC # BLD AUTO: 5.5 X10*3/UL (ref 4.4–11.3)
Y ENTEROCOL DNA STL QL NAA+PROBE: NOT DETECTED

## 2025-01-25 PROCEDURE — 99232 SBSQ HOSP IP/OBS MODERATE 35: CPT | Performed by: INTERNAL MEDICINE

## 2025-01-25 PROCEDURE — 2500000001 HC RX 250 WO HCPCS SELF ADMINISTERED DRUGS (ALT 637 FOR MEDICARE OP): Performed by: INTERNAL MEDICINE

## 2025-01-25 PROCEDURE — 99233 SBSQ HOSP IP/OBS HIGH 50: CPT | Performed by: INTERNAL MEDICINE

## 2025-01-25 PROCEDURE — 2500000004 HC RX 250 GENERAL PHARMACY W/ HCPCS (ALT 636 FOR OP/ED): Performed by: INTERNAL MEDICINE

## 2025-01-25 PROCEDURE — 2500000002 HC RX 250 W HCPCS SELF ADMINISTERED DRUGS (ALT 637 FOR MEDICARE OP, ALT 636 FOR OP/ED): Performed by: SURGERY

## 2025-01-25 PROCEDURE — 2500000002 HC RX 250 W HCPCS SELF ADMINISTERED DRUGS (ALT 637 FOR MEDICARE OP, ALT 636 FOR OP/ED): Performed by: INTERNAL MEDICINE

## 2025-01-25 PROCEDURE — 80053 COMPREHEN METABOLIC PANEL: CPT | Performed by: INTERNAL MEDICINE

## 2025-01-25 PROCEDURE — 85025 COMPLETE CBC W/AUTO DIFF WBC: CPT | Performed by: INTERNAL MEDICINE

## 2025-01-25 PROCEDURE — 82947 ASSAY GLUCOSE BLOOD QUANT: CPT

## 2025-01-25 PROCEDURE — 2500000004 HC RX 250 GENERAL PHARMACY W/ HCPCS (ALT 636 FOR OP/ED): Performed by: STUDENT IN AN ORGANIZED HEALTH CARE EDUCATION/TRAINING PROGRAM

## 2025-01-25 PROCEDURE — 2500000004 HC RX 250 GENERAL PHARMACY W/ HCPCS (ALT 636 FOR OP/ED)

## 2025-01-25 PROCEDURE — 51701 INSERT BLADDER CATHETER: CPT

## 2025-01-25 PROCEDURE — 2060000001 HC INTERMEDIATE ICU ROOM DAILY

## 2025-01-25 PROCEDURE — 36415 COLL VENOUS BLD VENIPUNCTURE: CPT | Performed by: INTERNAL MEDICINE

## 2025-01-25 PROCEDURE — 80048 BASIC METABOLIC PNL TOTAL CA: CPT | Mod: CCI | Performed by: INTERNAL MEDICINE

## 2025-01-25 RX ORDER — POTASSIUM CHLORIDE 14.9 MG/ML
20 INJECTION INTRAVENOUS
Status: DISPENSED | OUTPATIENT
Start: 2025-01-25 | End: 2025-01-25

## 2025-01-25 RX ORDER — METOPROLOL TARTRATE 50 MG/1
50 TABLET ORAL 2 TIMES DAILY
Status: DISCONTINUED | OUTPATIENT
Start: 2025-01-25 | End: 2025-02-04

## 2025-01-25 RX ORDER — ATORVASTATIN CALCIUM 40 MG/1
40 TABLET, FILM COATED ORAL NIGHTLY
Status: DISCONTINUED | OUTPATIENT
Start: 2025-01-25 | End: 2025-02-05 | Stop reason: HOSPADM

## 2025-01-25 RX ADMIN — METOPROLOL TARTRATE 50 MG: 50 TABLET, FILM COATED ORAL at 10:14

## 2025-01-25 RX ADMIN — HEPARIN SODIUM 5000 UNITS: 5000 INJECTION, SOLUTION INTRAVENOUS; SUBCUTANEOUS at 22:02

## 2025-01-25 RX ADMIN — HYDRALAZINE HYDROCHLORIDE 5 MG: 20 INJECTION INTRAMUSCULAR; INTRAVENOUS at 04:40

## 2025-01-25 RX ADMIN — PIPERACILLIN SODIUM AND TAZOBACTAM SODIUM 3.38 G: 3; .375 INJECTION, SOLUTION INTRAVENOUS at 10:14

## 2025-01-25 RX ADMIN — PANTOPRAZOLE SODIUM 40 MG: 40 INJECTION, POWDER, FOR SOLUTION INTRAVENOUS at 10:14

## 2025-01-25 RX ADMIN — METOCLOPRAMIDE HYDROCHLORIDE 10 MG: 5 INJECTION INTRAMUSCULAR; INTRAVENOUS at 23:01

## 2025-01-25 RX ADMIN — PIPERACILLIN SODIUM AND TAZOBACTAM SODIUM 3.38 G: 3; .375 INJECTION, SOLUTION INTRAVENOUS at 01:33

## 2025-01-25 RX ADMIN — ACETAMINOPHEN 650 MG: 325 TABLET ORAL at 19:28

## 2025-01-25 RX ADMIN — PANTOPRAZOLE SODIUM 40 MG: 40 INJECTION, POWDER, FOR SOLUTION INTRAVENOUS at 20:36

## 2025-01-25 RX ADMIN — METOCLOPRAMIDE HYDROCHLORIDE 10 MG: 5 INJECTION INTRAMUSCULAR; INTRAVENOUS at 12:12

## 2025-01-25 RX ADMIN — POTASSIUM CHLORIDE 20 MEQ: 14.9 INJECTION, SOLUTION INTRAVENOUS at 00:03

## 2025-01-25 RX ADMIN — POLYETHYLENE GLYCOL 3350 17 G: 17 POWDER, FOR SOLUTION ORAL at 10:14

## 2025-01-25 RX ADMIN — ATORVASTATIN CALCIUM 40 MG: 40 TABLET, FILM COATED ORAL at 20:35

## 2025-01-25 RX ADMIN — ASPIRIN 81 MG CHEWABLE TABLET 81 MG: 81 TABLET CHEWABLE at 10:14

## 2025-01-25 RX ADMIN — INSULIN GLARGINE 15 UNITS: 100 INJECTION, SOLUTION SUBCUTANEOUS at 12:12

## 2025-01-25 RX ADMIN — METOCLOPRAMIDE HYDROCHLORIDE 10 MG: 5 INJECTION INTRAMUSCULAR; INTRAVENOUS at 06:18

## 2025-01-25 RX ADMIN — METOPROLOL TARTRATE 50 MG: 50 TABLET, FILM COATED ORAL at 20:35

## 2025-01-25 RX ADMIN — PIPERACILLIN SODIUM AND TAZOBACTAM SODIUM 3.38 G: 3; .375 INJECTION, SOLUTION INTRAVENOUS at 14:31

## 2025-01-25 RX ADMIN — INSULIN LISPRO 6 UNITS: 100 INJECTION, SOLUTION INTRAVENOUS; SUBCUTANEOUS at 12:14

## 2025-01-25 RX ADMIN — POTASSIUM CHLORIDE 20 MEQ: 14.9 INJECTION, SOLUTION INTRAVENOUS at 02:14

## 2025-01-25 RX ADMIN — ACETAMINOPHEN 650 MG: 325 TABLET ORAL at 23:05

## 2025-01-25 RX ADMIN — PIPERACILLIN SODIUM AND TAZOBACTAM SODIUM 3.38 G: 3; .375 INJECTION, SOLUTION INTRAVENOUS at 19:28

## 2025-01-25 RX ADMIN — TAMSULOSIN HYDROCHLORIDE 0.4 MG: 0.4 CAPSULE ORAL at 06:18

## 2025-01-25 RX ADMIN — HEPARIN SODIUM 5000 UNITS: 5000 INJECTION, SOLUTION INTRAVENOUS; SUBCUTANEOUS at 06:18

## 2025-01-25 RX ADMIN — HEPARIN SODIUM 5000 UNITS: 5000 INJECTION, SOLUTION INTRAVENOUS; SUBCUTANEOUS at 13:44

## 2025-01-25 RX ADMIN — METOCLOPRAMIDE HYDROCHLORIDE 10 MG: 5 INJECTION INTRAMUSCULAR; INTRAVENOUS at 18:10

## 2025-01-25 RX ADMIN — POTASSIUM CHLORIDE 20 MEQ: 14.9 INJECTION, SOLUTION INTRAVENOUS at 10:14

## 2025-01-25 ASSESSMENT — PAIN DESCRIPTION - LOCATION
LOCATION: GENERALIZED
LOCATION: HEAD

## 2025-01-25 ASSESSMENT — PAIN SCALES - GENERAL
PAINLEVEL_OUTOF10: 3
PAINLEVEL_OUTOF10: 0 - NO PAIN
PAINLEVEL_OUTOF10: 0 - NO PAIN
PAINLEVEL_OUTOF10: 3

## 2025-01-25 ASSESSMENT — PAIN - FUNCTIONAL ASSESSMENT: PAIN_FUNCTIONAL_ASSESSMENT: 0-10

## 2025-01-25 ASSESSMENT — PAIN SCALES - WONG BAKER: WONGBAKER_NUMERICALRESPONSE: NO HURT

## 2025-01-25 NOTE — PROGRESS NOTES
Subjective Data:  Patient is doing better.  Still feeling slightly nauseous.  Still having abdominal pain.    Overnight Events:    Telemetry overnight reviewed no events     Objective Data:  Last Recorded Vitals:  Vitals:    01/25/25 0000 01/25/25 0400 01/25/25 0632 01/25/25 0720   BP: 149/69 164/83 143/72 143/76   BP Location:  Right arm  Right arm   Patient Position:  Lying  Lying   Pulse: 77 84 88 88   Resp: 18 15 13 22   Temp:  36.4 °C (97.5 °F)  37.1 °C (98.8 °F)   TempSrc:  Temporal  Temporal   SpO2: 95% 97% 98% 97%   Weight:       Height:           Last Labs:  CBC - 1/25/2025:  5:00 AM  5.5 10.7 164    31.2      CMP - 1/25/2025:  7:50 AM  8.1 5.6 41 --- 1.1   2.3 3.5 25 45      PTT - 1/22/2025:  6:15 AM  _   _ 24.9     TROPHS   Date/Time Value Ref Range Status   01/23/2025 07:54  0 - 20 ng/L Final   01/22/2025 01:29  0 - 20 ng/L Final     Comment:     Previous result verified on 1/21/2025 2211 on specimen/case [id number] called with component TRPHS for procedure Troponin I, High Sensitivity with value 59 ng/L.   01/22/2025 12:11  0 - 20 ng/L Final     Comment:     Previous result verified on 1/21/2025 2211 on specimen/case [id number] called with component TRPHS for procedure Troponin I, High Sensitivity with value 59 ng/L.     BNP   Date/Time Value Ref Range Status   01/22/2025 12:11  0 - 99 pg/mL Final   01/21/2025 02:03  0 - 99 pg/mL Final     HGBA1C   Date/Time Value Ref Range Status   01/21/2025 08:22 PM 10.2 See comment % Final   03/18/2021 02:00 PM 8.9 4.0 - 6.0 % Final     Comment:     Hemoglobin A1C levels are related to mean blood glucose during the   preceding 2-3 months. The relationship table below may be used as a   general guide. Each 1% increase in HGB A1C is a reflection of an   increase in mean glucose of approximately 30 mg/dl.   Reference: Diabetes Care, volume 29, supplement 1 Jan. 2006                        HGB A1C ................. Approx. Mean  Glucose   _______________________________________________   6%   ...............................  120 mg/dl   7%   ...............................  150 mg/dl   8%   ...............................  180 mg/dl   9%   ...............................  210 mg/dl   10%  ...............................  240 mg/dl  Performed at [address] [address]     10/30/2020 09:02 PM 8.3 4.0 - 6.0 % Final     Comment:     Hemoglobin A1C levels are related to mean blood glucose during the   preceding 2-3 months. The relationship table below may be used as a   general guide. Each 1% increase in HGB A1C is a reflection of an   increase in mean glucose of approximately 30 mg/dl.   Reference: Diabetes Care, volume 29, supplement 1 Jan. 2006                        HGB A1C ................. Approx. Mean Glucose   _______________________________________________   6%   ...............................  120 mg/dl   7%   ...............................  150 mg/dl   8%   ...............................  180 mg/dl   9%   ...............................  210 mg/dl   10%  ...............................  240 mg/dl  Performed at [address] [address]       LDLCALC   Date/Time Value Ref Range Status   07/31/2019 08:45 AM 55 65 - 130 MG/DL Final      Last I/O:  I/O last 3 completed shifts:  In: 200 (3.3 mL/kg) [IV Piggyback:200]  Out: 1970 (32.3 mL/kg) [Urine:1970 (0.9 mL/kg/hr)]  Weight: 61 kg     Past Cardiology Tests (Last 3 Years):  EKG:  ECG 12 lead 01/22/2025    Echo:  Transthoracic Echo (TTE) Complete 01/22/2025    Ejection Fractions:  EF   Date/Time Value Ref Range Status   01/22/2025 09:49 AM 63 %      Cath:  No results found for this or any previous visit from the past 1095 days.    Stress Test:  No results found for this or any previous visit from the past 1095 days.    Cardiac Imaging:  No results found for this or any previous visit from the past 1095 days.      Inpatient  Medications:  Scheduled medications   Medication Dose Route Frequency    aspirin  81 mg oral Daily    heparin  5,000 Units subcutaneous q8h    insulin glargine  15 Units subcutaneous q24h    insulin lispro  0-15 Units subcutaneous TID AC    metoclopramide  10 mg intravenous q6h EUFEMIA    metoprolol tartrate  25 mg oral BID    pantoprazole  40 mg intravenous BID    piperacillin-tazobactam  3.375 g intravenous q6h    polyethylene glycol  17 g oral TID    potassium chloride  20 mEq intravenous q2h    tamsulosin  0.4 mg oral Daily before breakfast     PRN medications   Medication    acetaminophen    Or    acetaminophen    Or    acetaminophen    dextrose    dextrose    dextrose    glucagon    glucagon    heparin (porcine)    hydrALAZINE    melatonin    ondansetron ODT    Or    ondansetron     Continuous Medications   Medication Dose Last Rate       Physical Exam:  General: Patient is in no acute distress.  HEENT: atraumatic normocephalic.  Neck: is supple jugular venous pressure within normal limits no thyromegaly.  Cardiovascular regular rate and rhythm normal heart sounds no murmurs rubs or gallops.  Lungs: clear to auscultation bilaterally.  Abdomen: is soft mild tenderness to palpation.  No guarding or rebound.  Extremities warm to touch no edema.  Neurologic examination: patient is awake alert oriented to person, place, date/time.  Psychiatric examination: patient has good insight denies feeling suicidal and depressed.  Pulses 2+ intact bilaterally        Assessment/Plan  1 elevated troponin.  Patient with history of diabetes hypertension hyperlipidemia and peripheral vascular disease admitted to the hospital with weakness nausea vomiting severe abdominal pain and diarrhea.  Abdominal CT showed pancolitis.  Troponin mildly elevated.  EKG within normal limit.  Patient has no chest pain.  Whether nausea is related to acute coronary syndrome I do believe is less likely most likely this is secondary to his pancolitis.   Patient improving overall.  Troponin trending down.  However his troponin peaked at 500.  EKG with nonspecific changes.  I think it is reasonable to proceed with ischemic workup further with stress test or cardiac catheterization by Monday.  Pancolitis is improving.  Continue aspirin statin.  Dr. Lake will follow-up in a.m.     2.  Peripheral vascular disease.  Recommend baby aspirin high intensity statin once he is more stable.  Control of blood pressure.     3.  Diabetes mellitus.  Patient admitted with DKA picture likely secondary to pancolitis.  Blood sugars improving.  Anion gap closing.  Will monitor.  Management by primary team.     4.  Hypertension.  Blood pressure and heart rate well-controlled.  Will monitor.  5.  Hyperlipidemia.  Recommend to start high intensity statin once he is more stable.    1/25: Patient sitting at edge of bed consuming limited breakfast.  No recurrent abdominal pain some vague nausea after eating which she needs to do lately.  EKG on admission showed sinus rhythm without any ST segment abnormalities.  Incidental high-sensitivity troponin elevation noted.  Patient does have risk factors for coronary artery disease as listed above including hypertension hyperlipidemia type 2 diabetes and peripheral vascular disease.  Patient is on aspirin 81 mg daily will add atorvastatin 40 mg daily increase metoprolol from 25 mg twice daily to 50 mg twice daily.  Will check repeat troponins which have been declining along with repeat EKG tracings to rule out any signs of ischemia.  Tentatively patient will be scheduled for a pharmacological nuclear stress test on 1/27/2025.  Of note the patient's echocardiogram from 1/22/2025 demonstrates a preserved LV ejection fraction of 60-65% with no segmental wall motion abnormalities with mild aortic valve stenosis patient's electrolyte panel from today is unremarkable.  CBC notable for hematocrit 31.2.  Patient being followed by GI potentially is  scheduled to have upper and lower endoscopy as well.  If this is planned for Monday then the stress test could be postponed for 24 hours.  Peripheral IV 01/21/25 20 G Right;Dorsal Wrist (Active)   Site Assessment Clean;Dry;Intact 01/24/25 0756   Dressing Status Clean;Dry;Occlusive 01/24/25 0756   Number of days: 3       Peripheral IV 01/22/25 20 G Right;Anterior Forearm (Active)   Site Assessment Clean;Dry;Intact 01/24/25 0756   Dressing Status Clean;Dry;Occlusive 01/24/25 0756   Number of days: 2       Peripheral IV 01/23/25 20 G Left;Anterior Forearm (Active)   Site Assessment Clean;Dry;Intact 01/24/25 0756   Dressing Status Clean;Dry;Occlusive 01/24/25 0756   Number of days: 1       Code Status:  Full Code        Yeison Lake MD

## 2025-01-25 NOTE — PROGRESS NOTES
"Devin Gomez is a 64 y.o. male on day 4 of admission presenting with Dehydration.    Subjective   Had some diarrhea yesterday. \"It was black.\" He denies current NV       Objective     Physical Exam  HENT:      Head: Normocephalic.      Nose: Nose normal.      Mouth/Throat:      Mouth: Mucous membranes are moist.   Eyes:      Pupils: Pupils are equal, round, and reactive to light.   Cardiovascular:      Rate and Rhythm: Regular rhythm.   Pulmonary:      Breath sounds: Normal breath sounds.   Abdominal:      Palpations: Abdomen is soft.   Musculoskeletal:      Cervical back: Normal range of motion.   Skin:     General: Skin is warm.   Neurological:      General: No focal deficit present.      Mental Status: He is alert.         Last Recorded Vitals  Blood pressure 143/76, pulse 88, temperature 37.1 °C (98.8 °F), temperature source Temporal, resp. rate 22, height 1.753 m (5' 9\"), weight 61 kg (134 lb 7.7 oz), SpO2 97%.  Intake/Output last 3 Shifts:  I/O last 3 completed shifts:  In: 200 (3.3 mL/kg) [IV Piggyback:200]  Out: 1970 (32.3 mL/kg) [Urine:1970 (0.9 mL/kg/hr)]  Weight: 61 kg     Relevant Results      Scheduled medications  aspirin, 81 mg, oral, Daily  atorvastatin, 40 mg, oral, Nightly  heparin, 5,000 Units, subcutaneous, q8h  insulin glargine, 15 Units, subcutaneous, q24h  insulin lispro, 0-15 Units, subcutaneous, TID AC  metoclopramide, 10 mg, intravenous, q6h EUFEMIA  metoprolol tartrate, 50 mg, oral, BID  pantoprazole, 40 mg, intravenous, BID  piperacillin-tazobactam, 3.375 g, intravenous, q6h  polyethylene glycol, 17 g, oral, TID  potassium chloride, 20 mEq, intravenous, q2h  tamsulosin, 0.4 mg, oral, Daily before breakfast      Continuous medications       PRN medications  PRN medications: acetaminophen **OR** acetaminophen **OR** acetaminophen, dextrose, dextrose, dextrose, glucagon, glucagon, heparin (porcine), hydrALAZINE, melatonin, ondansetron ODT **OR** ondansetron  Results for orders placed or " performed during the hospital encounter of 01/21/25 (from the past 24 hours)   POCT GLUCOSE   Result Value Ref Range    POCT Glucose 200 (H) 74 - 99 mg/dL   Basic metabolic panel   Result Value Ref Range    Glucose 201 (H) 74 - 99 mg/dL    Sodium 132 (L) 136 - 145 mmol/L    Potassium 3.6 3.5 - 5.3 mmol/L    Chloride 100 98 - 107 mmol/L    Bicarbonate 22 21 - 32 mmol/L    Anion Gap 14 10 - 20 mmol/L    Urea Nitrogen 24 (H) 6 - 23 mg/dL    Creatinine 1.20 0.50 - 1.30 mg/dL    eGFR 67 >60 mL/min/1.73m*2    Calcium 8.1 (L) 8.6 - 10.3 mg/dL   POCT GLUCOSE   Result Value Ref Range    POCT Glucose 163 (H) 74 - 99 mg/dL   Basic metabolic panel   Result Value Ref Range    Glucose 130 (H) 74 - 99 mg/dL    Sodium 133 (L) 136 - 145 mmol/L    Potassium 3.6 3.5 - 5.3 mmol/L    Chloride 101 98 - 107 mmol/L    Bicarbonate 22 21 - 32 mmol/L    Anion Gap 14 10 - 20 mmol/L    Urea Nitrogen 26 (H) 6 - 23 mg/dL    Creatinine 1.26 0.50 - 1.30 mg/dL    eGFR 63 >60 mL/min/1.73m*2    Calcium 8.4 (L) 8.6 - 10.3 mg/dL   POCT GLUCOSE   Result Value Ref Range    POCT Glucose 136 (H) 74 - 99 mg/dL   POCT GLUCOSE   Result Value Ref Range    POCT Glucose 223 (H) 74 - 99 mg/dL   Basic metabolic panel   Result Value Ref Range    Glucose 167 (H) 74 - 99 mg/dL    Sodium 133 (L) 136 - 145 mmol/L    Potassium 3.2 (L) 3.5 - 5.3 mmol/L    Chloride 101 98 - 107 mmol/L    Bicarbonate 24 21 - 32 mmol/L    Anion Gap 11 10 - 20 mmol/L    Urea Nitrogen 26 (H) 6 - 23 mg/dL    Creatinine 1.25 0.50 - 1.30 mg/dL    eGFR 64 >60 mL/min/1.73m*2    Calcium 8.2 (L) 8.6 - 10.3 mg/dL   CBC and Auto Differential   Result Value Ref Range    WBC 5.5 4.4 - 11.3 x10*3/uL    nRBC 0.0 0.0 - 0.0 /100 WBCs    RBC 3.75 (L) 4.50 - 5.90 x10*6/uL    Hemoglobin 10.7 (L) 13.5 - 17.5 g/dL    Hematocrit 31.2 (L) 41.0 - 52.0 %    MCV 83 80 - 100 fL    MCH 28.5 26.0 - 34.0 pg    MCHC 34.3 32.0 - 36.0 g/dL    RDW 12.7 11.5 - 14.5 %    Platelets 164 150 - 450 x10*3/uL    Neutrophils % 61.2  40.0 - 80.0 %    Immature Granulocytes %, Automated 0.2 0.0 - 0.9 %    Lymphocytes % 25.1 13.0 - 44.0 %    Monocytes % 12.6 2.0 - 10.0 %    Eosinophils % 0.4 0.0 - 6.0 %    Basophils % 0.5 0.0 - 2.0 %    Neutrophils Absolute 3.39 1.20 - 7.70 x10*3/uL    Immature Granulocytes Absolute, Automated 0.01 0.00 - 0.70 x10*3/uL    Lymphocytes Absolute 1.39 1.20 - 4.80 x10*3/uL    Monocytes Absolute 0.70 0.10 - 1.00 x10*3/uL    Eosinophils Absolute 0.02 0.00 - 0.70 x10*3/uL    Basophils Absolute 0.03 0.00 - 0.10 x10*3/uL   Comprehensive Metabolic Panel   Result Value Ref Range    Glucose 102 (H) 74 - 99 mg/dL    Sodium 134 (L) 136 - 145 mmol/L    Potassium 3.4 (L) 3.5 - 5.3 mmol/L    Chloride 101 98 - 107 mmol/L    Bicarbonate 25 21 - 32 mmol/L    Anion Gap 11 10 - 20 mmol/L    Urea Nitrogen 23 6 - 23 mg/dL    Creatinine 1.20 0.50 - 1.30 mg/dL    eGFR 67 >60 mL/min/1.73m*2    Calcium 8.2 (L) 8.6 - 10.3 mg/dL    Albumin 3.5 3.4 - 5.0 g/dL    Alkaline Phosphatase 45 33 - 136 U/L    Total Protein 5.6 (L) 6.4 - 8.2 g/dL    AST 41 (H) 9 - 39 U/L    Bilirubin, Total 1.1 0.0 - 1.2 mg/dL    ALT 25 10 - 52 U/L   POCT GLUCOSE   Result Value Ref Range    POCT Glucose 98 74 - 99 mg/dL   Basic metabolic panel   Result Value Ref Range    Glucose 99 74 - 99 mg/dL    Sodium 134 (L) 136 - 145 mmol/L    Potassium 3.4 (L) 3.5 - 5.3 mmol/L    Chloride 102 98 - 107 mmol/L    Bicarbonate 23 21 - 32 mmol/L    Anion Gap 12 10 - 20 mmol/L    Urea Nitrogen 23 6 - 23 mg/dL    Creatinine 1.14 0.50 - 1.30 mg/dL    eGFR 71 >60 mL/min/1.73m*2    Calcium 8.1 (L) 8.6 - 10.3 mg/dL          This patient currently has cardiac telemetry ordered; if you would like to modify or discontinue the telemetry order, click here to go to the orders activity to modify/discontinue the order.                 Assessment/Plan   Assessment & Plan  Dehydration    NSTEMI (non-ST elevated myocardial infarction) (Multi)    Diabetic ketoacidosis without coma associated with type 2  diabetes mellitus (Multi)    Pancolitis (Multi)    Vomiting    Bladder retention    Ambulatory dysfunction    Abdominal pain/Abnormal CT  Patient presented with abdominal cramping, nausea vomiting.  Was found to be in borderline DKA with dehydration. Patient reports after receiving IV fluid with improved control of blood sugar is feeling overall better. Due to CT findings of pancolitis, recommend colonoscopy at some point to further evaluate. Defer timing to attending. Will order inflammatory markers.  Stool studies have been ordered.  At this time we will continue antibiotics as ordered. Will discuss further recommendation with Dr. Ham.        1/25  CT a/p on arrival showed descending colon thickening. Possible infectious vs ischemic colitis. Patient reports black stool yesterday. Noted HH decline, hgb 11.7 to 10.7. Pending cardiac clearance (He can have the stress test first on Monday), we can complete EGD/colonoscopy on Tuesday. Can complete more urgently over the weekend if active bleeding     Ally Ratliff, APRN-CNP

## 2025-01-25 NOTE — PROGRESS NOTES
Devin Gomez is a 64 y.o. male on day 4 of admission presenting with Dehydration.      Subjective   No nausea no shortness of breath no vomiting no abdominal pain he had 2 loose bowel movements yesterday none since then overall doing well       Objective     Last Recorded Vitals  /76 (BP Location: Right arm, Patient Position: Lying)   Pulse 88   Temp 37.1 °C (98.8 °F) (Temporal)   Resp 22   Wt 61 kg (134 lb 7.7 oz)   SpO2 97%   Intake/Output last 3 Shifts:    Intake/Output Summary (Last 24 hours) at 1/25/2025 0840  Last data filed at 1/25/2025 0457  Gross per 24 hour   Intake 200 ml   Output 1021 ml   Net -821 ml       Physical Exam  Alert oriented x 3 cooperative no distress  Chest clear heart regular  Abdomen soft nontender  Extremities no edema  Neurologic exam nonfocal  Relevant Results  Reviewed  Assessment/Plan     Assessment & Plan  Dehydration  Pancolitis  Complaining of stomach cramping  Abdomen pelvis showed pancolitis  Elevated lactate  IV fluids half-normal saline at 150 an hour  Clear liquid diet  Stool panel  Zosyn  Consult gastroenterology  Type 2 diabetes mellitus with hyperglycemia  Blood glucose 283 and will give 5 units IV insulin  Check ABG  Check blood glucose every 2 hours  Check BMP every 4 hours  High blood pressure without diagnosis of hypertension  Hydralazine for systolic blood pressure greater than 160  Diffuse abdominal aorta plaque  Consider vascular consult outpatient  Urinary bladder distention  Bladder scan for over 1100  Patient was straight cath  Will do bladder scans every 6 hours and straight cath for residual greater than 350 mL  DVT prophylaxis  Lovenox  NSTEMI (non-ST elevated myocardial infarction) (Multi)    Diabetic ketoacidosis without coma associated with type 2 diabetes mellitus (Multi)    Pancolitis (Multi)    Vomiting    Bladder retention    Ambulatory dysfunction      January 25,    DKA resolved diabetes controlled  Nausea vomiting resolved  Questionable  colitis no abdominal pain 1 time liquidy bowel movement stool pathogen and fecal calprotectin pending  No chest pain    Cardiology suggesting stress test or cardiac catheterization on Monday  Awaiting to see GI follow-up notes             Ismael Knowles MD

## 2025-01-25 NOTE — CARE PLAN
The clinical goals for the shift include EGD outpatient, remain hemodynamically stable    Problem: Diabetes  Goal: Achieve decreasing blood glucose levels by end of shift  Outcome: Progressing  Goal: Increase stability of blood glucose readings by end of shift  Outcome: Progressing  Goal: Decrease in ketones present in urine by end of shift  Outcome: Progressing  Goal: Maintain electrolyte levels within acceptable range throughout shift  Outcome: Progressing  Goal: Maintain glucose levels >70mg/dl to <250mg/dl throughout shift  Outcome: Progressing  Goal: No changes in neurological exam by end of shift  Outcome: Progressing  Goal: Learn about and adhere to nutrition recommendations by end of shift  Outcome: Progressing  Goal: Vital signs within normal range for age by end of shift  Outcome: Progressing  Goal: Increase self care and/or family involovement by end of shift  Outcome: Progressing  Goal: Receive DSME education by end of shift  Outcome: Progressing     Problem: Pain - Adult  Goal: Verbalizes/displays adequate comfort level or baseline comfort level  Outcome: Progressing     Problem: Safety - Adult  Goal: Free from fall injury  Outcome: Progressing     Problem: Discharge Planning  Goal: Discharge to home or other facility with appropriate resources  Outcome: Progressing     Problem: Discharge Planning  Goal: Discharge to home or other facility with appropriate resources  Outcome: Progressing     Problem: Chronic Conditions and Co-morbidities  Goal: Patient's chronic conditions and co-morbidity symptoms are monitored and maintained or improved  Outcome: Progressing     Problem: Fall/Injury  Goal: Not fall by end of shift  Outcome: Progressing  Goal: Be free from injury by end of the shift  Outcome: Progressing  Goal: Verbalize understanding of personal risk factors for fall in the hospital  Outcome: Progressing  Goal: Verbalize understanding of risk factor reduction measures to prevent injury from fall in  the home  Outcome: Progressing  Goal: Use assistive devices by end of the shift  Outcome: Progressing  Goal: Pace activities to prevent fatigue by end of the shift  Outcome: Progressing     Problem: Pain  Goal: Takes deep breaths with improved pain control throughout the shift  Outcome: Progressing  Goal: Turns in bed with improved pain control throughout the shift  Outcome: Progressing  Goal: Walks with improved pain control throughout the shift  Outcome: Progressing  Goal: Performs ADL's with improved pain control throughout shift  Outcome: Progressing  Goal: Participates in PT with improved pain control throughout the shift  Outcome: Progressing  Goal: Free from opioid side effects throughout the shift  Outcome: Progressing  Goal: Free from acute confusion related to pain meds throughout the shift  Outcome: Progressing

## 2025-01-26 ENCOUNTER — APPOINTMENT (OUTPATIENT)
Dept: CARDIOLOGY | Facility: HOSPITAL | Age: 65
End: 2025-01-26
Payer: COMMERCIAL

## 2025-01-26 VITALS
SYSTOLIC BLOOD PRESSURE: 145 MMHG | TEMPERATURE: 97.3 F | RESPIRATION RATE: 16 BRPM | OXYGEN SATURATION: 98 % | BODY MASS INDEX: 19.92 KG/M2 | HEART RATE: 77 BPM | DIASTOLIC BLOOD PRESSURE: 65 MMHG | WEIGHT: 134.48 LBS | HEIGHT: 69 IN

## 2025-01-26 LAB
ALBUMIN SERPL BCP-MCNC: 3.4 G/DL (ref 3.4–5)
ALP SERPL-CCNC: 49 U/L (ref 33–136)
ALT SERPL W P-5'-P-CCNC: 25 U/L (ref 10–52)
ANION GAP SERPL CALCULATED.3IONS-SCNC: 12 MMOL/L (ref 10–20)
AST SERPL W P-5'-P-CCNC: 37 U/L (ref 9–39)
BACTERIA BLD CULT: NORMAL
BACTERIA BLD CULT: NORMAL
BASOPHILS # BLD AUTO: 0.02 X10*3/UL (ref 0–0.1)
BASOPHILS NFR BLD AUTO: 0.4 %
BILIRUB SERPL-MCNC: 0.8 MG/DL (ref 0–1.2)
BUN SERPL-MCNC: 20 MG/DL (ref 6–23)
CALCIUM SERPL-MCNC: 8.1 MG/DL (ref 8.6–10.3)
CARDIAC TROPONIN I PNL SERPL HS: 140 NG/L (ref 0–20)
CHLORIDE SERPL-SCNC: 101 MMOL/L (ref 98–107)
CO2 SERPL-SCNC: 24 MMOL/L (ref 21–32)
CREAT SERPL-MCNC: 1.09 MG/DL (ref 0.5–1.3)
EGFRCR SERPLBLD CKD-EPI 2021: 75 ML/MIN/1.73M*2
EOSINOPHIL # BLD AUTO: 0.05 X10*3/UL (ref 0–0.7)
EOSINOPHIL NFR BLD AUTO: 1.1 %
ERYTHROCYTE [DISTWIDTH] IN BLOOD BY AUTOMATED COUNT: 13 % (ref 11.5–14.5)
GLUCOSE BLD MANUAL STRIP-MCNC: 136 MG/DL (ref 74–99)
GLUCOSE BLD MANUAL STRIP-MCNC: 140 MG/DL (ref 74–99)
GLUCOSE BLD MANUAL STRIP-MCNC: 155 MG/DL (ref 74–99)
GLUCOSE BLD MANUAL STRIP-MCNC: 160 MG/DL (ref 74–99)
GLUCOSE BLD MANUAL STRIP-MCNC: 170 MG/DL (ref 74–99)
GLUCOSE BLD MANUAL STRIP-MCNC: 189 MG/DL (ref 74–99)
GLUCOSE BLD MANUAL STRIP-MCNC: 80 MG/DL (ref 74–99)
GLUCOSE SERPL-MCNC: 161 MG/DL (ref 74–99)
HCT VFR BLD AUTO: 30.9 % (ref 41–52)
HGB BLD-MCNC: 10.5 G/DL (ref 13.5–17.5)
IMM GRANULOCYTES # BLD AUTO: 0.01 X10*3/UL (ref 0–0.7)
IMM GRANULOCYTES NFR BLD AUTO: 0.2 % (ref 0–0.9)
LYMPHOCYTES # BLD AUTO: 1.2 X10*3/UL (ref 1.2–4.8)
LYMPHOCYTES NFR BLD AUTO: 25.2 %
MCH RBC QN AUTO: 28.5 PG (ref 26–34)
MCHC RBC AUTO-ENTMCNC: 34 G/DL (ref 32–36)
MCV RBC AUTO: 84 FL (ref 80–100)
MONOCYTES # BLD AUTO: 0.48 X10*3/UL (ref 0.1–1)
MONOCYTES NFR BLD AUTO: 10.1 %
NEUTROPHILS # BLD AUTO: 3 X10*3/UL (ref 1.2–7.7)
NEUTROPHILS NFR BLD AUTO: 63 %
NRBC BLD-RTO: 0 /100 WBCS (ref 0–0)
PLATELET # BLD AUTO: 164 X10*3/UL (ref 150–450)
POTASSIUM SERPL-SCNC: 3.5 MMOL/L (ref 3.5–5.3)
PROT SERPL-MCNC: 5.5 G/DL (ref 6.4–8.2)
RBC # BLD AUTO: 3.68 X10*6/UL (ref 4.5–5.9)
SODIUM SERPL-SCNC: 133 MMOL/L (ref 136–145)
WBC # BLD AUTO: 4.8 X10*3/UL (ref 4.4–11.3)

## 2025-01-26 PROCEDURE — 2500000004 HC RX 250 GENERAL PHARMACY W/ HCPCS (ALT 636 FOR OP/ED): Performed by: INTERNAL MEDICINE

## 2025-01-26 PROCEDURE — 84484 ASSAY OF TROPONIN QUANT: CPT | Performed by: INTERNAL MEDICINE

## 2025-01-26 PROCEDURE — 93005 ELECTROCARDIOGRAM TRACING: CPT

## 2025-01-26 PROCEDURE — 2500000002 HC RX 250 W HCPCS SELF ADMINISTERED DRUGS (ALT 637 FOR MEDICARE OP, ALT 636 FOR OP/ED): Performed by: INTERNAL MEDICINE

## 2025-01-26 PROCEDURE — 2500000001 HC RX 250 WO HCPCS SELF ADMINISTERED DRUGS (ALT 637 FOR MEDICARE OP): Performed by: INTERNAL MEDICINE

## 2025-01-26 PROCEDURE — 85025 COMPLETE CBC W/AUTO DIFF WBC: CPT | Performed by: INTERNAL MEDICINE

## 2025-01-26 PROCEDURE — 2060000001 HC INTERMEDIATE ICU ROOM DAILY

## 2025-01-26 PROCEDURE — 2500000004 HC RX 250 GENERAL PHARMACY W/ HCPCS (ALT 636 FOR OP/ED)

## 2025-01-26 PROCEDURE — 99232 SBSQ HOSP IP/OBS MODERATE 35: CPT | Performed by: INTERNAL MEDICINE

## 2025-01-26 PROCEDURE — 80053 COMPREHEN METABOLIC PANEL: CPT | Performed by: INTERNAL MEDICINE

## 2025-01-26 PROCEDURE — 82947 ASSAY GLUCOSE BLOOD QUANT: CPT

## 2025-01-26 PROCEDURE — 36415 COLL VENOUS BLD VENIPUNCTURE: CPT | Performed by: INTERNAL MEDICINE

## 2025-01-26 PROCEDURE — 2500000002 HC RX 250 W HCPCS SELF ADMINISTERED DRUGS (ALT 637 FOR MEDICARE OP, ALT 636 FOR OP/ED): Performed by: SURGERY

## 2025-01-26 RX ADMIN — METOCLOPRAMIDE HYDROCHLORIDE 10 MG: 5 INJECTION INTRAMUSCULAR; INTRAVENOUS at 23:14

## 2025-01-26 RX ADMIN — METOCLOPRAMIDE HYDROCHLORIDE 10 MG: 5 INJECTION INTRAMUSCULAR; INTRAVENOUS at 13:08

## 2025-01-26 RX ADMIN — HEPARIN SODIUM 5000 UNITS: 5000 INJECTION, SOLUTION INTRAVENOUS; SUBCUTANEOUS at 06:04

## 2025-01-26 RX ADMIN — PANTOPRAZOLE SODIUM 40 MG: 40 INJECTION, POWDER, FOR SOLUTION INTRAVENOUS at 09:39

## 2025-01-26 RX ADMIN — PIPERACILLIN SODIUM AND TAZOBACTAM SODIUM 3.38 G: 3; .375 INJECTION, SOLUTION INTRAVENOUS at 01:19

## 2025-01-26 RX ADMIN — METOPROLOL TARTRATE 50 MG: 50 TABLET, FILM COATED ORAL at 09:39

## 2025-01-26 RX ADMIN — HEPARIN SODIUM 5000 UNITS: 5000 INJECTION, SOLUTION INTRAVENOUS; SUBCUTANEOUS at 21:55

## 2025-01-26 RX ADMIN — METOCLOPRAMIDE HYDROCHLORIDE 10 MG: 5 INJECTION INTRAMUSCULAR; INTRAVENOUS at 17:10

## 2025-01-26 RX ADMIN — HYDRALAZINE HYDROCHLORIDE 5 MG: 20 INJECTION INTRAMUSCULAR; INTRAVENOUS at 03:20

## 2025-01-26 RX ADMIN — METOPROLOL TARTRATE 50 MG: 50 TABLET, FILM COATED ORAL at 20:09

## 2025-01-26 RX ADMIN — HEPARIN SODIUM 5000 UNITS: 5000 INJECTION, SOLUTION INTRAVENOUS; SUBCUTANEOUS at 13:08

## 2025-01-26 RX ADMIN — ASPIRIN 81 MG CHEWABLE TABLET 81 MG: 81 TABLET CHEWABLE at 09:39

## 2025-01-26 RX ADMIN — TAMSULOSIN HYDROCHLORIDE 0.4 MG: 0.4 CAPSULE ORAL at 06:04

## 2025-01-26 RX ADMIN — METOCLOPRAMIDE HYDROCHLORIDE 10 MG: 5 INJECTION INTRAMUSCULAR; INTRAVENOUS at 06:04

## 2025-01-26 RX ADMIN — ATORVASTATIN CALCIUM 40 MG: 40 TABLET, FILM COATED ORAL at 20:09

## 2025-01-26 RX ADMIN — PANTOPRAZOLE SODIUM 40 MG: 40 INJECTION, POWDER, FOR SOLUTION INTRAVENOUS at 20:09

## 2025-01-26 RX ADMIN — INSULIN LISPRO 3 UNITS: 100 INJECTION, SOLUTION INTRAVENOUS; SUBCUTANEOUS at 17:11

## 2025-01-26 RX ADMIN — POLYETHYLENE GLYCOL 3350 17 G: 17 POWDER, FOR SOLUTION ORAL at 09:39

## 2025-01-26 ASSESSMENT — PAIN SCALES - GENERAL
PAINLEVEL_OUTOF10: 0 - NO PAIN

## 2025-01-26 ASSESSMENT — PAIN SCALES - WONG BAKER: WONGBAKER_NUMERICALRESPONSE: NO HURT

## 2025-01-26 ASSESSMENT — PAIN - FUNCTIONAL ASSESSMENT
PAIN_FUNCTIONAL_ASSESSMENT: 0-10
PAIN_FUNCTIONAL_ASSESSMENT: 0-10

## 2025-01-26 NOTE — PROGRESS NOTES
Subjective Data:  Patient is doing better.  Still feeling slightly nauseous.  Still having abdominal pain.    Overnight Events:    Telemetry overnight reviewed no events     Objective Data:  Last Recorded Vitals:  Vitals:    01/26/25 0315 01/26/25 0317 01/26/25 0400 01/26/25 0721   BP: 170/77 160/82 133/72 (!) 152/109   BP Location:  Left arm  Right arm   Patient Position:  Lying  Lying   Pulse: 73 71 77 78   Resp: 21 16 17 18   Temp:  36.4 °C (97.5 °F)  36.6 °C (97.9 °F)   TempSrc:  Temporal  Temporal   SpO2: 96% 97% 97% 97%   Weight:       Height:           Last Labs:  CBC - 1/26/2025:  4:58 AM  4.8 10.5 164    30.9      CMP - 1/26/2025:  4:58 AM  8.1 5.5 37 --- 0.8   2.3 3.4 25 49      PTT - 1/22/2025:  6:15 AM  _   _ 24.9     TROPHS   Date/Time Value Ref Range Status   01/26/2025 04:58  0 - 20 ng/L Final   01/23/2025 07:54  0 - 20 ng/L Final   01/22/2025 01:29  0 - 20 ng/L Final     Comment:     Previous result verified on 1/21/2025 2211 on specimen/case [id number] called with component [facility] for procedure Troponin I, High Sensitivity with value 59 ng/L.     BNP   Date/Time Value Ref Range Status   01/22/2025 12:11  0 - 99 pg/mL Final   01/21/2025 02:03  0 - 99 pg/mL Final     HGBA1C   Date/Time Value Ref Range Status   01/21/2025 08:22 PM 10.2 See comment % Final   03/18/2021 02:00 PM 8.9 4.0 - 6.0 % Final     Comment:     Hemoglobin A1C levels are related to mean blood glucose during the   preceding 2-3 months. The relationship table below may be used as a   general guide. Each 1% increase in HGB A1C is a reflection of an   increase in mean glucose of approximately 30 mg/dl.   Reference: Diabetes Care, volume 29, supplement 1 Jan. 2006                        HGB A1C ................. Approx. Mean Glucose   _______________________________________________   6%   ...............................  120 mg/dl   7%   ...............................  150 mg/dl   8%    ...............................  180 mg/dl   9%   ...............................  210 mg/dl   10%  ...............................  240 mg/dl  Performed at [address] [address]     10/30/2020 09:02 PM 8.3 4.0 - 6.0 % Final     Comment:     Hemoglobin A1C levels are related to mean blood glucose during the   preceding 2-3 months. The relationship table below may be used as a   general guide. Each 1% increase in HGB A1C is a reflection of an   increase in mean glucose of approximately 30 mg/dl.   Reference: Diabetes Care, volume 29, supplement 1 Jan. 2006                        HGB A1C ................. Approx. Mean Glucose   _______________________________________________   6%   ...............................  120 mg/dl   7%   ...............................  150 mg/dl   8%   ...............................  180 mg/dl   9%   ...............................  210 mg/dl   10%  ...............................  240 mg/dl  Performed at [address] [address]       LDLCALC   Date/Time Value Ref Range Status   07/31/2019 08:45 AM 55 65 - 130 MG/DL Final      Last I/O:  I/O last 3 completed shifts:  In: 250 (4.1 mL/kg) [IV Piggyback:250]  Out: 1021 (16.7 mL/kg) [Urine:1021 (0.5 mL/kg/hr)]  Weight: 61 kg     Past Cardiology Tests (Last 3 Years):  EKG:  ECG 12 lead 01/22/2025    Echo:  Transthoracic Echo (TTE) Complete 01/22/2025    Ejection Fractions:  EF   Date/Time Value Ref Range Status   01/22/2025 09:49 AM 63 %      Cath:  No results found for this or any previous visit from the past 1095 days.    Stress Test:  No results found for this or any previous visit from the past 1095 days.    Cardiac Imaging:  No results found for this or any previous visit from the past 1095 days.      Inpatient Medications:  Scheduled medications   Medication Dose Route Frequency    aspirin  81 mg oral Daily    atorvastatin  40 mg oral Nightly    heparin  5,000 Units subcutaneous q8h     insulin lispro  0-15 Units subcutaneous TID AC    metoclopramide  10 mg intravenous q6h EFUEMIA    metoprolol tartrate  50 mg oral BID    pantoprazole  40 mg intravenous BID    polyethylene glycol  17 g oral TID    tamsulosin  0.4 mg oral Daily before breakfast     PRN medications   Medication    acetaminophen    Or    acetaminophen    Or    acetaminophen    dextrose    dextrose    dextrose    glucagon    glucagon    heparin (porcine)    hydrALAZINE    melatonin    ondansetron ODT    Or    ondansetron     Continuous Medications   Medication Dose Last Rate       Physical Exam:  General: Patient is in no acute distress.  HEENT: atraumatic normocephalic.  Neck: is supple jugular venous pressure within normal limits no thyromegaly.  Cardiovascular regular rate and rhythm normal heart sounds no murmurs rubs or gallops.  Lungs: clear to auscultation bilaterally.  Abdomen: is soft mild tenderness to palpation.  No guarding or rebound.  Extremities warm to touch no edema.  Neurologic examination: patient is awake alert oriented to person, place, date/time.  Psychiatric examination: patient has good insight denies feeling suicidal and depressed.  Pulses 2+ intact bilaterally        Assessment/Plan  1 elevated troponin.  Patient with history of diabetes hypertension hyperlipidemia and peripheral vascular disease admitted to the hospital with weakness nausea vomiting severe abdominal pain and diarrhea.  Abdominal CT showed pancolitis.  Troponin mildly elevated.  EKG within normal limit.  Patient has no chest pain.  Whether nausea is related to acute coronary syndrome I do believe is less likely most likely this is secondary to his pancolitis.  Patient improving overall.  Troponin trending down.  However his troponin peaked at 500.  EKG with nonspecific changes.  I think it is reasonable to proceed with ischemic workup further with stress test or cardiac catheterization by Monday.  Pancolitis is improving.  Continue aspirin statin.   Dr. Lake will follow-up in a.m.     2.  Peripheral vascular disease.  Recommend baby aspirin high intensity statin once he is more stable.  Control of blood pressure.     3.  Diabetes mellitus.  Patient admitted with DKA picture likely secondary to pancolitis.  Blood sugars improving.  Anion gap closing.  Will monitor.  Management by primary team.     4.  Hypertension.  Blood pressure and heart rate well-controlled.  Will monitor.  5.  Hyperlipidemia.  Recommend to start high intensity statin once he is more stable.    1/25: Patient sitting at edge of bed consuming limited breakfast.  No recurrent abdominal pain some vague nausea after eating which she needs to do lately.  EKG on admission showed sinus rhythm without any ST segment abnormalities.  Incidental high-sensitivity troponin elevation noted.  Patient does have risk factors for coronary artery disease as listed above including hypertension hyperlipidemia type 2 diabetes and peripheral vascular disease.  Patient is on aspirin 81 mg daily will add atorvastatin 40 mg daily increase metoprolol from 25 mg twice daily to 50 mg twice daily.  Will check repeat troponins which have been declining along with repeat EKG tracings to rule out any signs of ischemia.  Tentatively patient will be scheduled for a pharmacological nuclear stress test on 1/27/2025.  Of note the patient's echocardiogram from 1/22/2025 demonstrates a preserved LV ejection fraction of 60-65% with no segmental wall motion abnormalities with mild aortic valve stenosis patient's electrolyte panel from today is unremarkable.  CBC notable for hematocrit 31.2.  Patient being followed by GI potentially is scheduled to have upper and lower endoscopy as well.  If this is planned for Monday then the stress test could be postponed for 24 hours.    1/26: The patient is reasonably comfortable weak and somewhat depressed.  He is trying to eat small amounts of solids although he still has some intermittent  nausea.  The patient's CT scan of the abdomen and pelvis on initial presentation did demonstrate some descending colonic thickening question infectious versus ischemic colitis.  Patient has had some intermittent black stool but hemoglobin essentially unchanged from yesterday at 10.5.  Electrolyte panel is relatively unremarkable other than the glucose of 161.  Creatinine is 1.09 potassium 3.5 sodium 133.  The high-sensitivity troponin has decreased to 140.  Patient is scheduled for pharmacological nuclear stress test tomorrow to rule out clinically significant CAD based on his multiple risk factors and the slightly elevated high-sensitivity troponin on admission which was detected incidentally.  As noted his echocardiogram shows a preserved LV ejection fraction without segmental wall motion abnormalities.  Patient is currently on aspirin atorvastatin and metoprolol.  Peripheral IV 01/21/25 20 G Right;Dorsal Wrist (Active)   Site Assessment Clean;Dry;Intact 01/24/25 0756   Dressing Status Clean;Dry;Occlusive 01/24/25 0756   Number of days: 3       Peripheral IV 01/22/25 20 G Right;Anterior Forearm (Active)   Site Assessment Clean;Dry;Intact 01/24/25 0756   Dressing Status Clean;Dry;Occlusive 01/24/25 0756   Number of days: 2       Peripheral IV 01/23/25 20 G Left;Anterior Forearm (Active)   Site Assessment Clean;Dry;Intact 01/24/25 0756   Dressing Status Clean;Dry;Occlusive 01/24/25 0756   Number of days: 1       Code Status:  Full Code        Yeison Lake MD

## 2025-01-26 NOTE — CARE PLAN
The clinical goals for the shift include Pt safety and hemodynamic stability.    Problem: Diabetes  Goal: Achieve decreasing blood glucose levels by end of shift  Outcome: Progressing  Goal: Increase stability of blood glucose readings by end of shift  Outcome: Progressing  Goal: Decrease in ketones present in urine by end of shift  Outcome: Progressing  Goal: Maintain electrolyte levels within acceptable range throughout shift  Outcome: Progressing  Goal: Maintain glucose levels >70mg/dl to <250mg/dl throughout shift  Outcome: Progressing  Goal: No changes in neurological exam by end of shift  Outcome: Progressing  Goal: Learn about and adhere to nutrition recommendations by end of shift  Outcome: Progressing  Goal: Vital signs within normal range for age by end of shift  Outcome: Progressing  Goal: Increase self care and/or family involovement by end of shift  Outcome: Progressing  Goal: Receive DSME education by end of shift  Outcome: Progressing     Problem: Pain - Adult  Goal: Verbalizes/displays adequate comfort level or baseline comfort level  Outcome: Progressing     Problem: Safety - Adult  Goal: Free from fall injury  Outcome: Progressing     Problem: Discharge Planning  Goal: Discharge to home or other facility with appropriate resources  Outcome: Progressing     Problem: Chronic Conditions and Co-morbidities  Goal: Patient's chronic conditions and co-morbidity symptoms are monitored and maintained or improved  Outcome: Progressing     Problem: Fall/Injury  Goal: Not fall by end of shift  Outcome: Progressing  Goal: Be free from injury by end of the shift  Outcome: Progressing  Goal: Verbalize understanding of personal risk factors for fall in the hospital  Outcome: Progressing  Goal: Verbalize understanding of risk factor reduction measures to prevent injury from fall in the home  Outcome: Progressing  Goal: Use assistive devices by end of the shift  Outcome: Progressing  Goal: Pace activities to prevent  fatigue by end of the shift  Outcome: Progressing     Problem: Pain  Goal: Takes deep breaths with improved pain control throughout the shift  Outcome: Progressing  Goal: Turns in bed with improved pain control throughout the shift  Outcome: Progressing  Goal: Walks with improved pain control throughout the shift  Outcome: Progressing  Goal: Performs ADL's with improved pain control throughout shift  Outcome: Progressing  Goal: Participates in PT with improved pain control throughout the shift  Outcome: Progressing  Goal: Free from opioid side effects throughout the shift  Outcome: Progressing  Goal: Free from acute confusion related to pain meds throughout the shift  Outcome: Progressing

## 2025-01-26 NOTE — CARE PLAN
The patient's goals for the shift include      The clinical goals for the shift include hemodynamic stability and effective pain management.    Over the shift, the patient did not make progress toward the following goals. Barriers to progression include . Recommendations to address these barriers include .

## 2025-01-26 NOTE — PROGRESS NOTES
Devin Gomez is a 64 y.o. male on day 5 of admission presenting with Dehydration.      Subjective   No new symptoms  Occasional nausea but able to eat  No diarrhea no abdominal pain stool studies negative stool pathogen tests  Still continues to require straight cath as he cannot void by himself, nurse secure messaging urology  Cardiology planning stress test tomorrow  GI may be planning colonoscopy       Objective     Last Recorded Vitals  BP (!) 152/109   Pulse 78   Temp 36.6 °C (97.9 °F) (Temporal)   Resp 18   Wt 61 kg (134 lb 7.7 oz)   SpO2 97%   Intake/Output last 3 Shifts:    Intake/Output Summary (Last 24 hours) at 1/26/2025 0846  Last data filed at 1/25/2025 1217  Gross per 24 hour   Intake 250 ml   Output --   Net 250 ml       Physical Exam  Alert oriented x 3 cooperative no distress  Chest clear  Heart regular  Abdomen soft nontender  Extremities no edema  Neurologic exam nonfocal  Relevant Results  Reviewed  Assessment/Plan     Assessment & Plan  Dehydration  Pancolitis  Complaining of stomach cramping  Abdomen pelvis showed pancolitis  Elevated lactate  IV fluids half-normal saline at 150 an hour  Clear liquid diet  Stool panel  Zosyn  Consult gastroenterology  Type 2 diabetes mellitus with hyperglycemia  Blood glucose 283 and will give 5 units IV insulin  Check ABG  Check blood glucose every 2 hours  Check BMP every 4 hours  High blood pressure without diagnosis of hypertension  Hydralazine for systolic blood pressure greater than 160  Diffuse abdominal aorta plaque  Consider vascular consult outpatient  Urinary bladder distention  Bladder scan for over 1100  Patient was straight cath  Will do bladder scans every 6 hours and straight cath for residual greater than 350 mL  DVT prophylaxis  Lovenox  NSTEMI (non-ST elevated myocardial infarction) (Multi)    Diabetic ketoacidosis without coma associated with type 2 diabetes mellitus (Multi)    Pancolitis (Multi)    Vomiting    Bladder  retention    Ambulatory dysfunction      January 26:    Stress testing colonoscopy per consulting services scheduled for tomorrow    Awaiting input from urology as far as his bladder retention    Will stop Lantus as he runs low normal, sliding scale for now;     will discontinue Zosyn and observe Zosyn was empirically started for colitis but he has no symptoms related to it and pathogens were negative; is no evidence for UTI despite bladder retention; ordered repeat UA             Ismael Knowles MD

## 2025-01-27 ENCOUNTER — APPOINTMENT (OUTPATIENT)
Dept: CARDIOLOGY | Facility: HOSPITAL | Age: 65
End: 2025-01-27
Payer: COMMERCIAL

## 2025-01-27 LAB
ALBUMIN SERPL BCP-MCNC: 3.3 G/DL (ref 3.4–5)
ALP SERPL-CCNC: 47 U/L (ref 33–136)
ALT SERPL W P-5'-P-CCNC: 24 U/L (ref 10–52)
ANION GAP SERPL CALCULATED.3IONS-SCNC: 13 MMOL/L (ref 10–20)
APPEARANCE UR: CLEAR
AST SERPL W P-5'-P-CCNC: 25 U/L (ref 9–39)
ATRIAL RATE: 68 BPM
BASOPHILS # BLD AUTO: 0.04 X10*3/UL (ref 0–0.1)
BASOPHILS NFR BLD AUTO: 1 %
BILIRUB SERPL-MCNC: 0.7 MG/DL (ref 0–1.2)
BILIRUB UR STRIP.AUTO-MCNC: NEGATIVE MG/DL
BUN SERPL-MCNC: 18 MG/DL (ref 6–23)
CALCIUM SERPL-MCNC: 7.9 MG/DL (ref 8.6–10.3)
CHLORIDE SERPL-SCNC: 98 MMOL/L (ref 98–107)
CO2 SERPL-SCNC: 24 MMOL/L (ref 21–32)
COLOR UR: ABNORMAL
CREAT SERPL-MCNC: 1.03 MG/DL (ref 0.5–1.3)
EGFRCR SERPLBLD CKD-EPI 2021: 80 ML/MIN/1.73M*2
EOSINOPHIL # BLD AUTO: 0.13 X10*3/UL (ref 0–0.7)
EOSINOPHIL NFR BLD AUTO: 3.2 %
ERYTHROCYTE [DISTWIDTH] IN BLOOD BY AUTOMATED COUNT: 12.6 % (ref 11.5–14.5)
GLUCOSE BLD MANUAL STRIP-MCNC: 215 MG/DL (ref 74–99)
GLUCOSE BLD MANUAL STRIP-MCNC: 220 MG/DL (ref 74–99)
GLUCOSE BLD MANUAL STRIP-MCNC: 223 MG/DL (ref 74–99)
GLUCOSE BLD MANUAL STRIP-MCNC: 590 MG/DL (ref 74–99)
GLUCOSE SERPL-MCNC: 198 MG/DL (ref 74–99)
GLUCOSE UR STRIP.AUTO-MCNC: ABNORMAL MG/DL
HCT VFR BLD AUTO: 30.8 % (ref 41–52)
HGB BLD-MCNC: 10.5 G/DL (ref 13.5–17.5)
IMM GRANULOCYTES # BLD AUTO: 0.02 X10*3/UL (ref 0–0.7)
IMM GRANULOCYTES NFR BLD AUTO: 0.5 % (ref 0–0.9)
KETONES UR STRIP.AUTO-MCNC: ABNORMAL MG/DL
LEUKOCYTE ESTERASE UR QL STRIP.AUTO: NEGATIVE
LYMPHOCYTES # BLD AUTO: 1.19 X10*3/UL (ref 1.2–4.8)
LYMPHOCYTES NFR BLD AUTO: 29.7 %
MCH RBC QN AUTO: 28.3 PG (ref 26–34)
MCHC RBC AUTO-ENTMCNC: 34.1 G/DL (ref 32–36)
MCV RBC AUTO: 83 FL (ref 80–100)
MONOCYTES # BLD AUTO: 0.39 X10*3/UL (ref 0.1–1)
MONOCYTES NFR BLD AUTO: 9.7 %
NEUTROPHILS # BLD AUTO: 2.24 X10*3/UL (ref 1.2–7.7)
NEUTROPHILS NFR BLD AUTO: 55.9 %
NITRITE UR QL STRIP.AUTO: NEGATIVE
NRBC BLD-RTO: 0 /100 WBCS (ref 0–0)
P AXIS: 66 DEGREES
P OFFSET: 197 MS
P ONSET: 160 MS
PH UR STRIP.AUTO: 6 [PH]
PLATELET # BLD AUTO: 168 X10*3/UL (ref 150–450)
POTASSIUM SERPL-SCNC: 3.8 MMOL/L (ref 3.5–5.3)
PR INTERVAL: 118 MS
PROT SERPL-MCNC: 5.3 G/DL (ref 6.4–8.2)
PROT UR STRIP.AUTO-MCNC: NEGATIVE MG/DL
Q ONSET: 219 MS
QRS COUNT: 11 BEATS
QRS DURATION: 86 MS
QT INTERVAL: 460 MS
QTC CALCULATION(BAZETT): 489 MS
QTC FREDERICIA: 479 MS
R AXIS: 56 DEGREES
RBC # BLD AUTO: 3.71 X10*6/UL (ref 4.5–5.9)
RBC # UR STRIP.AUTO: NEGATIVE /UL
SODIUM SERPL-SCNC: 131 MMOL/L (ref 136–145)
SP GR UR STRIP.AUTO: 1.02
T AXIS: 83 DEGREES
T OFFSET: 449 MS
UROBILINOGEN UR STRIP.AUTO-MCNC: NORMAL MG/DL
VENTRICULAR RATE: 68 BPM
WBC # BLD AUTO: 4 X10*3/UL (ref 4.4–11.3)

## 2025-01-27 PROCEDURE — 97535 SELF CARE MNGMENT TRAINING: CPT | Mod: GO

## 2025-01-27 PROCEDURE — 51701 INSERT BLADDER CATHETER: CPT

## 2025-01-27 PROCEDURE — 2500000001 HC RX 250 WO HCPCS SELF ADMINISTERED DRUGS (ALT 637 FOR MEDICARE OP): Performed by: INTERNAL MEDICINE

## 2025-01-27 PROCEDURE — 85025 COMPLETE CBC W/AUTO DIFF WBC: CPT | Performed by: INTERNAL MEDICINE

## 2025-01-27 PROCEDURE — 2500000004 HC RX 250 GENERAL PHARMACY W/ HCPCS (ALT 636 FOR OP/ED): Performed by: NURSE PRACTITIONER

## 2025-01-27 PROCEDURE — 2500000001 HC RX 250 WO HCPCS SELF ADMINISTERED DRUGS (ALT 637 FOR MEDICARE OP): Performed by: NURSE PRACTITIONER

## 2025-01-27 PROCEDURE — 2500000004 HC RX 250 GENERAL PHARMACY W/ HCPCS (ALT 636 FOR OP/ED)

## 2025-01-27 PROCEDURE — 2500000002 HC RX 250 W HCPCS SELF ADMINISTERED DRUGS (ALT 637 FOR MEDICARE OP, ALT 636 FOR OP/ED): Performed by: SURGERY

## 2025-01-27 PROCEDURE — 93010 ELECTROCARDIOGRAM REPORT: CPT | Performed by: INTERNAL MEDICINE

## 2025-01-27 PROCEDURE — 99231 SBSQ HOSP IP/OBS SF/LOW 25: CPT | Performed by: SURGERY

## 2025-01-27 PROCEDURE — 2060000001 HC INTERMEDIATE ICU ROOM DAILY

## 2025-01-27 PROCEDURE — 81003 URINALYSIS AUTO W/O SCOPE: CPT | Performed by: INTERNAL MEDICINE

## 2025-01-27 PROCEDURE — 99232 SBSQ HOSP IP/OBS MODERATE 35: CPT | Performed by: INTERNAL MEDICINE

## 2025-01-27 PROCEDURE — 2500000002 HC RX 250 W HCPCS SELF ADMINISTERED DRUGS (ALT 637 FOR MEDICARE OP, ALT 636 FOR OP/ED): Performed by: INTERNAL MEDICINE

## 2025-01-27 PROCEDURE — 82947 ASSAY GLUCOSE BLOOD QUANT: CPT

## 2025-01-27 PROCEDURE — 2500000004 HC RX 250 GENERAL PHARMACY W/ HCPCS (ALT 636 FOR OP/ED): Performed by: INTERNAL MEDICINE

## 2025-01-27 PROCEDURE — 80053 COMPREHEN METABOLIC PANEL: CPT | Performed by: INTERNAL MEDICINE

## 2025-01-27 PROCEDURE — 36415 COLL VENOUS BLD VENIPUNCTURE: CPT | Performed by: INTERNAL MEDICINE

## 2025-01-27 PROCEDURE — 97110 THERAPEUTIC EXERCISES: CPT | Mod: GO

## 2025-01-27 PROCEDURE — 93005 ELECTROCARDIOGRAM TRACING: CPT

## 2025-01-27 RX ORDER — BISACODYL 5 MG
20 TABLET, DELAYED RELEASE (ENTERIC COATED) ORAL ONCE
Status: COMPLETED | OUTPATIENT
Start: 2025-01-27 | End: 2025-01-27

## 2025-01-27 RX ORDER — POLYETHYLENE GLYCOL 3350 17 G/17G
238 POWDER, FOR SOLUTION ORAL ONCE
Status: COMPLETED | OUTPATIENT
Start: 2025-01-27 | End: 2025-01-27

## 2025-01-27 RX ADMIN — POLYETHYLENE GLYCOL 3350 238 G: 17 POWDER, FOR SOLUTION ORAL at 15:50

## 2025-01-27 RX ADMIN — METOCLOPRAMIDE HYDROCHLORIDE 10 MG: 5 INJECTION INTRAMUSCULAR; INTRAVENOUS at 17:40

## 2025-01-27 RX ADMIN — PANTOPRAZOLE SODIUM 40 MG: 40 INJECTION, POWDER, FOR SOLUTION INTRAVENOUS at 08:36

## 2025-01-27 RX ADMIN — INSULIN LISPRO 6 UNITS: 100 INJECTION, SOLUTION INTRAVENOUS; SUBCUTANEOUS at 17:41

## 2025-01-27 RX ADMIN — METOPROLOL TARTRATE 50 MG: 50 TABLET, FILM COATED ORAL at 22:48

## 2025-01-27 RX ADMIN — HEPARIN SODIUM 5000 UNITS: 5000 INJECTION, SOLUTION INTRAVENOUS; SUBCUTANEOUS at 06:33

## 2025-01-27 RX ADMIN — POLYETHYLENE GLYCOL 3350 17 G: 17 POWDER, FOR SOLUTION ORAL at 15:51

## 2025-01-27 RX ADMIN — METOCLOPRAMIDE HYDROCHLORIDE 10 MG: 5 INJECTION INTRAMUSCULAR; INTRAVENOUS at 06:33

## 2025-01-27 RX ADMIN — BISACODYL 20 MG: 5 TABLET, COATED ORAL at 13:03

## 2025-01-27 RX ADMIN — ASPIRIN 81 MG CHEWABLE TABLET 81 MG: 81 TABLET CHEWABLE at 08:36

## 2025-01-27 RX ADMIN — HEPARIN SODIUM 5000 UNITS: 5000 INJECTION, SOLUTION INTRAVENOUS; SUBCUTANEOUS at 13:02

## 2025-01-27 RX ADMIN — HEPARIN SODIUM 5000 UNITS: 5000 INJECTION, SOLUTION INTRAVENOUS; SUBCUTANEOUS at 22:50

## 2025-01-27 RX ADMIN — ATORVASTATIN CALCIUM 40 MG: 40 TABLET, FILM COATED ORAL at 22:48

## 2025-01-27 RX ADMIN — METOPROLOL TARTRATE 50 MG: 50 TABLET, FILM COATED ORAL at 08:36

## 2025-01-27 RX ADMIN — POLYETHYLENE GLYCOL 3350 17 G: 17 POWDER, FOR SOLUTION ORAL at 08:37

## 2025-01-27 RX ADMIN — INSULIN LISPRO 6 UNITS: 100 INJECTION, SOLUTION INTRAVENOUS; SUBCUTANEOUS at 08:37

## 2025-01-27 RX ADMIN — PANTOPRAZOLE SODIUM 40 MG: 40 INJECTION, POWDER, FOR SOLUTION INTRAVENOUS at 22:48

## 2025-01-27 RX ADMIN — TAMSULOSIN HYDROCHLORIDE 0.4 MG: 0.4 CAPSULE ORAL at 06:33

## 2025-01-27 RX ADMIN — METOCLOPRAMIDE HYDROCHLORIDE 10 MG: 5 INJECTION INTRAMUSCULAR; INTRAVENOUS at 13:02

## 2025-01-27 RX ADMIN — INSULIN LISPRO 6 UNITS: 100 INJECTION, SOLUTION INTRAVENOUS; SUBCUTANEOUS at 12:58

## 2025-01-27 RX ADMIN — HYDRALAZINE HYDROCHLORIDE 5 MG: 20 INJECTION INTRAMUSCULAR; INTRAVENOUS at 03:36

## 2025-01-27 ASSESSMENT — COGNITIVE AND FUNCTIONAL STATUS - GENERAL
CLIMB 3 TO 5 STEPS WITH RAILING: A LITTLE
TOILETING: A LOT
PERSONAL GROOMING: A LITTLE
MOBILITY SCORE: 22
EATING MEALS: A LITTLE
WALKING IN HOSPITAL ROOM: A LITTLE
DAILY ACTIVITIY SCORE: 17
DAILY ACTIVITIY SCORE: 24
DRESSING REGULAR LOWER BODY CLOTHING: A LITTLE
HELP NEEDED FOR BATHING: A LITTLE
DRESSING REGULAR UPPER BODY CLOTHING: A LITTLE

## 2025-01-27 ASSESSMENT — PAIN - FUNCTIONAL ASSESSMENT
PAIN_FUNCTIONAL_ASSESSMENT: 0-10

## 2025-01-27 ASSESSMENT — PAIN SCALES - GENERAL
PAINLEVEL_OUTOF10: 0 - NO PAIN

## 2025-01-27 ASSESSMENT — ACTIVITIES OF DAILY LIVING (ADL): HOME_MANAGEMENT_TIME_ENTRY: 10

## 2025-01-27 NOTE — PROGRESS NOTES
"Devin Gomez is a 64 y.o. male on day 6 of admission presenting with Dehydration.    Subjective   Denies currently bloody or black stools. Still with abdominal pain       Objective     Physical Exam  HENT:      Head: Normocephalic.      Nose: Nose normal.      Mouth/Throat:      Mouth: Mucous membranes are moist.   Eyes:      Pupils: Pupils are equal, round, and reactive to light.   Cardiovascular:      Rate and Rhythm: Regular rhythm.   Pulmonary:      Breath sounds: Normal breath sounds.   Abdominal:      Palpations: Abdomen is soft.   Musculoskeletal:      Cervical back: Normal range of motion.   Skin:     General: Skin is warm.   Neurological:      General: No focal deficit present.      Mental Status: He is alert.         Last Recorded Vitals  Blood pressure 131/57, pulse 79, temperature 36.5 °C (97.7 °F), temperature source Temporal, resp. rate 22, height 1.753 m (5' 9\"), weight 61.4 kg (135 lb 4.8 oz), SpO2 90%.  Intake/Output last 3 Shifts:  I/O last 3 completed shifts:  In: - (0 mL/kg)   Out: 1275 (20.8 mL/kg) [Urine:1275 (0.6 mL/kg/hr)]  Weight: 61.4 kg     Relevant Results      Scheduled medications  aspirin, 81 mg, oral, Daily  atorvastatin, 40 mg, oral, Nightly  bisacodyl, 20 mg, oral, Once  heparin, 5,000 Units, subcutaneous, q8h  insulin lispro, 0-15 Units, subcutaneous, TID AC  metoclopramide, 10 mg, intravenous, q6h EUFEMIA  metoprolol tartrate, 50 mg, oral, BID  pantoprazole, 40 mg, intravenous, BID  polyethylene glycol, 17 g, oral, TID  polyethylene glycol, 238 g, oral, Once  tamsulosin, 0.4 mg, oral, Daily before breakfast      Continuous medications       PRN medications  PRN medications: acetaminophen **OR** acetaminophen **OR** acetaminophen, dextrose, dextrose, dextrose, glucagon, glucagon, heparin (porcine), hydrALAZINE, melatonin, ondansetron ODT **OR** ondansetron  Results for orders placed or performed during the hospital encounter of 01/21/25 (from the past 24 hours)   POCT GLUCOSE "   Result Value Ref Range    POCT Glucose 140 (H) 74 - 99 mg/dL   POCT GLUCOSE   Result Value Ref Range    POCT Glucose 170 (H) 74 - 99 mg/dL   POCT GLUCOSE   Result Value Ref Range    POCT Glucose 189 (H) 74 - 99 mg/dL   Urinalysis with Reflex Microscopic   Result Value Ref Range    Color, Urine Light-Yellow Light-Yellow, Yellow, Dark-Yellow    Appearance, Urine Clear Clear    Specific Gravity, Urine 1.019 1.005 - 1.035    pH, Urine 6.0 5.0, 5.5, 6.0, 6.5, 7.0, 7.5, 8.0    Protein, Urine NEGATIVE NEGATIVE, 10 (TRACE), 20 (TRACE) mg/dL    Glucose, Urine 500 (3+) (A) Normal mg/dL    Blood, Urine NEGATIVE NEGATIVE    Ketones, Urine 20 (1+) (A) NEGATIVE mg/dL    Bilirubin, Urine NEGATIVE NEGATIVE    Urobilinogen, Urine Normal Normal mg/dL    Nitrite, Urine NEGATIVE NEGATIVE    Leukocyte Esterase, Urine NEGATIVE NEGATIVE   CBC and Auto Differential   Result Value Ref Range    WBC 4.0 (L) 4.4 - 11.3 x10*3/uL    nRBC 0.0 0.0 - 0.0 /100 WBCs    RBC 3.71 (L) 4.50 - 5.90 x10*6/uL    Hemoglobin 10.5 (L) 13.5 - 17.5 g/dL    Hematocrit 30.8 (L) 41.0 - 52.0 %    MCV 83 80 - 100 fL    MCH 28.3 26.0 - 34.0 pg    MCHC 34.1 32.0 - 36.0 g/dL    RDW 12.6 11.5 - 14.5 %    Platelets 168 150 - 450 x10*3/uL    Neutrophils % 55.9 40.0 - 80.0 %    Immature Granulocytes %, Automated 0.5 0.0 - 0.9 %    Lymphocytes % 29.7 13.0 - 44.0 %    Monocytes % 9.7 2.0 - 10.0 %    Eosinophils % 3.2 0.0 - 6.0 %    Basophils % 1.0 0.0 - 2.0 %    Neutrophils Absolute 2.24 1.20 - 7.70 x10*3/uL    Immature Granulocytes Absolute, Automated 0.02 0.00 - 0.70 x10*3/uL    Lymphocytes Absolute 1.19 (L) 1.20 - 4.80 x10*3/uL    Monocytes Absolute 0.39 0.10 - 1.00 x10*3/uL    Eosinophils Absolute 0.13 0.00 - 0.70 x10*3/uL    Basophils Absolute 0.04 0.00 - 0.10 x10*3/uL   Comprehensive Metabolic Panel   Result Value Ref Range    Glucose 198 (H) 74 - 99 mg/dL    Sodium 131 (L) 136 - 145 mmol/L    Potassium 3.8 3.5 - 5.3 mmol/L    Chloride 98 98 - 107 mmol/L     Bicarbonate 24 21 - 32 mmol/L    Anion Gap 13 10 - 20 mmol/L    Urea Nitrogen 18 6 - 23 mg/dL    Creatinine 1.03 0.50 - 1.30 mg/dL    eGFR 80 >60 mL/min/1.73m*2    Calcium 7.9 (L) 8.6 - 10.3 mg/dL    Albumin 3.3 (L) 3.4 - 5.0 g/dL    Alkaline Phosphatase 47 33 - 136 U/L    Total Protein 5.3 (L) 6.4 - 8.2 g/dL    AST 25 9 - 39 U/L    Bilirubin, Total 0.7 0.0 - 1.2 mg/dL    ALT 24 10 - 52 U/L   POCT GLUCOSE   Result Value Ref Range    POCT Glucose 215 (H) 74 - 99 mg/dL          This patient currently has cardiac telemetry ordered; if you would like to modify or discontinue the telemetry order, click here to go to the orders activity to modify/discontinue the order.                 Assessment/Plan   Assessment & Plan  Dehydration    NSTEMI (non-ST elevated myocardial infarction) (Multi)    Diabetic ketoacidosis without coma associated with type 2 diabetes mellitus (Multi)    Pancolitis (Multi)    Vomiting    Bladder retention    Ambulatory dysfunction    Abdominal pain/Abnormal CT  Patient presented with abdominal cramping, nausea vomiting.  Was found to be in borderline DKA with dehydration. Patient reports after receiving IV fluid with improved control of blood sugar is feeling overall better. Due to CT findings of pancolitis, recommend colonoscopy at some point to further evaluate. Defer timing to attending. Will order inflammatory markers.  Stool studies have been ordered.  At this time we will continue antibiotics as ordered. Will discuss further recommendation with Dr. Ham.        1/25  CT a/p on arrival showed descending colon thickening. Possible infectious vs ischemic colitis. Patient reports black stool yesterday. Noted HH decline, hgb 11.7 to 10.7. Pending cardiac clearance (He can have the stress test first on Monday), we can complete EGD/colonoscopy on Tuesday. Can complete more urgently over the weekend if active bleeding     1/27  HH stable. Given abnormal CT with dark stools, we will plan  EGD/colonoscopy tomorrow. This will be at [facility] at 1030. IFRAH king.  aware    -Clear liquid plus prep    -NPO after midnight       Ally Ratliff, ELHAM-CNP

## 2025-01-27 NOTE — PROGRESS NOTES
"Devin Gomez is a 64 y.o. male on day 6 of admission presenting with Dehydration.    Subjective   He has voided spontaneously.  RN did do a straight catheter for >350 ml.  He is planned for a stress test tomorrow.           Objective     Physical Exam  Awake, alert  Breathing comfortably, respirations unlabored  Abdomen soft, ND, NT        Last Recorded Vitals  Blood pressure 124/71, pulse 67, temperature 36.7 °C (98.1 °F), temperature source Temporal, resp. rate 20, height 1.753 m (5' 9\"), weight 61.3 kg (135 lb 2.3 oz), SpO2 90%.  Intake/Output last 3 Shifts:  I/O last 3 completed shifts:  In: - (0 mL/kg)   Out: 1275 (20.8 mL/kg) [Urine:1275 (0.6 mL/kg/hr)]  Weight: 61.4 kg     Relevant Results  Scheduled medications  aspirin, 81 mg, oral, Daily  atorvastatin, 40 mg, oral, Nightly  heparin, 5,000 Units, subcutaneous, q8h  insulin lispro, 0-15 Units, subcutaneous, TID AC  metoclopramide, 10 mg, intravenous, q6h EUFEMIA  metoprolol tartrate, 50 mg, oral, BID  pantoprazole, 40 mg, intravenous, BID  polyethylene glycol, 17 g, oral, TID  tamsulosin, 0.4 mg, oral, Daily before breakfast      Continuous medications     PRN medications  PRN medications: acetaminophen **OR** acetaminophen **OR** acetaminophen, dextrose, dextrose, dextrose, glucagon, glucagon, heparin (porcine), hydrALAZINE, melatonin, ondansetron ODT **OR** ondansetron    Results for orders placed or performed during the hospital encounter of 01/21/25 (from the past 24 hours)   POCT GLUCOSE   Result Value Ref Range    POCT Glucose 189 (H) 74 - 99 mg/dL   Urinalysis with Reflex Microscopic   Result Value Ref Range    Color, Urine Light-Yellow Light-Yellow, Yellow, Dark-Yellow    Appearance, Urine Clear Clear    Specific Gravity, Urine 1.019 1.005 - 1.035    pH, Urine 6.0 5.0, 5.5, 6.0, 6.5, 7.0, 7.5, 8.0    Protein, Urine NEGATIVE NEGATIVE, 10 (TRACE), 20 (TRACE) mg/dL    Glucose, Urine 500 (3+) (A) Normal mg/dL    Blood, Urine NEGATIVE NEGATIVE    Ketones, " Urine 20 (1+) (A) NEGATIVE mg/dL    Bilirubin, Urine NEGATIVE NEGATIVE    Urobilinogen, Urine Normal Normal mg/dL    Nitrite, Urine NEGATIVE NEGATIVE    Leukocyte Esterase, Urine NEGATIVE NEGATIVE   CBC and Auto Differential   Result Value Ref Range    WBC 4.0 (L) 4.4 - 11.3 x10*3/uL    nRBC 0.0 0.0 - 0.0 /100 WBCs    RBC 3.71 (L) 4.50 - 5.90 x10*6/uL    Hemoglobin 10.5 (L) 13.5 - 17.5 g/dL    Hematocrit 30.8 (L) 41.0 - 52.0 %    MCV 83 80 - 100 fL    MCH 28.3 26.0 - 34.0 pg    MCHC 34.1 32.0 - 36.0 g/dL    RDW 12.6 11.5 - 14.5 %    Platelets 168 150 - 450 x10*3/uL    Neutrophils % 55.9 40.0 - 80.0 %    Immature Granulocytes %, Automated 0.5 0.0 - 0.9 %    Lymphocytes % 29.7 13.0 - 44.0 %    Monocytes % 9.7 2.0 - 10.0 %    Eosinophils % 3.2 0.0 - 6.0 %    Basophils % 1.0 0.0 - 2.0 %    Neutrophils Absolute 2.24 1.20 - 7.70 x10*3/uL    Immature Granulocytes Absolute, Automated 0.02 0.00 - 0.70 x10*3/uL    Lymphocytes Absolute 1.19 (L) 1.20 - 4.80 x10*3/uL    Monocytes Absolute 0.39 0.10 - 1.00 x10*3/uL    Eosinophils Absolute 0.13 0.00 - 0.70 x10*3/uL    Basophils Absolute 0.04 0.00 - 0.10 x10*3/uL   Comprehensive Metabolic Panel   Result Value Ref Range    Glucose 198 (H) 74 - 99 mg/dL    Sodium 131 (L) 136 - 145 mmol/L    Potassium 3.8 3.5 - 5.3 mmol/L    Chloride 98 98 - 107 mmol/L    Bicarbonate 24 21 - 32 mmol/L    Anion Gap 13 10 - 20 mmol/L    Urea Nitrogen 18 6 - 23 mg/dL    Creatinine 1.03 0.50 - 1.30 mg/dL    eGFR 80 >60 mL/min/1.73m*2    Calcium 7.9 (L) 8.6 - 10.3 mg/dL    Albumin 3.3 (L) 3.4 - 5.0 g/dL    Alkaline Phosphatase 47 33 - 136 U/L    Total Protein 5.3 (L) 6.4 - 8.2 g/dL    AST 25 9 - 39 U/L    Bilirubin, Total 0.7 0.0 - 1.2 mg/dL    ALT 24 10 - 52 U/L   POCT GLUCOSE   Result Value Ref Range    POCT Glucose 215 (H) 74 - 99 mg/dL   POCT GLUCOSE   Result Value Ref Range    POCT Glucose 223 (H) 74 - 99 mg/dL   ECG 12 Lead   Result Value Ref Range    Ventricular Rate 68 BPM    Atrial Rate 68 BPM     IN Interval 118 ms    QRS Duration 86 ms    QT Interval 460 ms    QTC Calculation(Bazett) 489 ms    P Axis 66 degrees    R Axis 56 degrees    T Axis 83 degrees    QRS Count 11 beats    Q Onset 219 ms    P Onset 160 ms    P Offset 197 ms    T Offset 449 ms    QTC Fredericia 479 ms   POCT GLUCOSE   Result Value Ref Range    POCT Glucose 220 (H) 74 - 99 mg/dL       ECG 12 Lead    Result Date: 1/27/2025   Poor data quality, interpretation may be adversely affected Normal sinus rhythm Prolonged QT Abnormal ECG When compared with ECG of 22-JAN-2025 06:30, No significant change was found Confirmed by Raphael Arrington ([id number]) on 1/27/2025 12:55:28 PM    CT abdomen pelvis w IV contrast    Result Date: 1/23/2025  Interpreted By:  Mahesh Mendez, STUDY: CT ABDOMEN PELVIS W IV CONTRAST; 1/23/2025 3:00 pm   INDICATION: Signs/Symptoms:Persistent unexplained nausea, follow-up on pancolitis found on earlier CT.   COMPARISON: 01/21/2025   ACCESSION NUMBER(S): [id number]   ORDERING CLINICIAN: BILLY ESPINAL   TECHNIQUE: The examination was performed without the use of oral contrast material as was requested.   The examination was performed with the intravenous administration of 75 ml of non-ionic iodinated contrast material.   One or more of the following dose reduction techniques were used: Automated exposure control Adjustment of the mA and/or kV according to patient size, and/or use of iterative reconstruction technique.   FINDINGS: ABDOMEN CT: SOLID ORGAN ASSESSMENT: There is a new small right pleural effusion. There is right basilar atelectasis.  The liver, spleen, pancreas, and adrenal glands are normal in appearance. There are multiple punctate calcifications throughout the mid, upper, lower pole calyceal system of the kidneys bilaterally with the largest at the lower pole on the left measuring 4 mm.  The biliary tree is unremarkable in appearance.   RETROPERITONEUM: AORTA: There is extensive calcific plaque throughout the  abdominal aorta and its branches with calcific plaque encroaching upon slightly less than 50% of the distal aortic lumen. There are common iliac artery stents bilaterally. There is no evidence for abdominal aortic aneurysm.   LYMPH NODES: There is no evidence for retroperitoneal lymphadenopathy.   BOWEL ASSESSMENT: No intraperitoneal free air is identified. There is a nonspecific appearance of the stomach.  The small  bowel is unremarkable in appearance. There is mucosal thickening of the descending colon and sigmoid colon as well as present on the prior study. I do not appreciate mucosal thickening of the right hemicolon are the transverse colon. There is a normal retrocecal appendix with air within the appendiceal tip the colon is nonspecific in appearance.   ABDOMINAL AND PELVIC WALLS: There is no evidence for a hernia. No abdominal wall masses are identified.   OSSEOUS STRUCTURES: No lytic or blastic lesions are identified.   PELVIC CT: No pathologic masses or fluid collections are identified.       1. Mucosal thickening of the descending colon is suspected. Assessment is somewhat limited as the colon is collapsed. Findings raise concern for possible left-sided infectious colitis versus inflammatory bowel disease. 2. Bilateral nephrolithiasis. 3. Extensive calcific plaque throughout the abdominal aorta and its branches as above. 4. New small right pleural effusion with right basilar atelectasis.     MACRO: none   Signed by: Mahesh Mendez 1/23/2025 4:09 PM Dictation workstation:   [id number]    CT head wo IV contrast    Result Date: 1/23/2025  Interpreted By:  Mahesh Mendez, STUDY: CT HEAD WO IV CONTRAST; 1/23/2025 3:00 pm   INDICATION: Signs/Symptoms:Persistent unexplained nausea.   COMPARISON: None.   ACCESSION NUMBER(S): [id number]   ORDERING CLINICIAN: BILLY ESPINAL   TECHNIQUE: A helical acquisition data was obtained.   One or more of the following dose reduction techniques were used: Automated  exposure control Adjustment of the mA and/or kV according to patient size, and/or use of iterative reconstruction technique.   FINDINGS: Intracranial findings: There is no evidence for intracranial hemorrhage or mass effect. There is a 7 mm area of diminished attenuation along the left side of the angelito suspicious for an area of lacunar infarction, probably remote (Series 3, Image 26).. Correlation with MRI of the brain would be useful for more definitive assessment of this finding. Age-related atrophy is present. Periventricular white matter hypodensity is present, most consistent with age-related change and/or white matter ischemic disease.   Paranasal sinuses and temporal bone findings:  The visualized portions of the paranasal sinuses, mastoid air cells, and middle ear cavities are clear.   Orbital findings: The visualized portions of the orbits are unremarkable.       1. Area of diminished attenuation along the left side of the past possibly representing an area of remote lacunar infarction. Correlation with MRI of the brain without contrast would be useful for more definitive assessment. 2. Supratentorial atrophy is noted.       MACRO: none   Signed by: Mahesh Mendez 1/23/2025 3:59 PM Dictation workstation:   [id number]    ECG 12 lead    Result Date: 1/22/2025  Normal sinus rhythm Possible Left atrial enlargement Nonspecific ST abnormality Abnormal ECG When compared with ECG of 21-JAN-2025 12:25, (unconfirmed) ST no longer depressed in Inferior leads Confirmed by Hubert Vidales ([id number]) on 1/22/2025 2:10:16 PM    Transthoracic Echo (TTE) Complete    Result Date: 1/22/2025            [facility] [place] [address] , [place] [id number]             Phone [phone] TRANSTHORACIC ECHOCARDIOGRAM REPORT Patient Name:       PRADEEP SULLIVAN      Reading Physician:    [id number] Hubert Vidales MD Study Date:         1/22/2025            Ordering  Provider:    [id number] AVELINA ZIMMERMAN MRN/PID:            [id number]             Fellow: Accession#:         [id number]         Nurse: Date of Birth/Age:  1960 / 64 years Sonographer:          Cheyenne Diaz RDCS Gender Assigned at  M                    Additional Staff: Birth: Height:             175.26 cm            Admit Date: Weight:             59.42 kg             Admission Status:     Inpatient -                                                                Routine BSA / BMI:          1.73 m2 / 19.35      Department Location:  [facility]                     kg/m2 Blood Pressure: 147 /71 mmHg Study Type:    TRANSTHORACIC ECHO (TTE) COMPLETE Diagnosis/ICD: Non ST elevation (NSTEMI) myocardial infarction-I21.4 Indication:    elevated troponin, NSTEMI CPT Codes:     Echo Complete w Full [id number] Patient History: Pertinent History: NSTEMI. Study Detail: The following Echo studies were performed: 2D, M-Mode, Doppler and               color flow. Technically challenging study due to prominent lung               artifact. Definity used as a contrast agent for endocardial border               definition. Total contrast used for this procedure was 4 mL via IV               push. Unable to obtain suprasternal notch view.  PHYSICIAN INTERPRETATION: Left Ventricle: The left ventricular systolic function is normal, with a visually estimated ejection fraction of 60-65%. There are no regional wall motion abnormalities. The left ventricular cavity size is normal. There is normal septal and normal posterior left ventricular wall thickness. There is left ventricular concentric remodeling. Spectral Doppler shows a normal pattern of left ventricular diastolic filling. Left Atrium: The left atrium is normal in size. Right Ventricle: The right ventricle is normal in size. There is normal  right ventricular global systolic function. Right Atrium: The right atrium is normal in size. Aortic Valve: The aortic valve is trileaflet. There is mild aortic valve thickening. There is evidence of mild aortic valve stenosis. The aortic valve dimensionless index is 0.38. There is trace aortic valve regurgitation. The peak instantaneous gradient of the aortic valve is 25 mmHg. The mean gradient of the aortic valve is 16 mmHg. There is evidence of mild aortic valve stenosis with aortic valve area of 1.6 cmï¿½. Mild gradients. Dimensionless index of 0.38. Mitral Valve: The mitral valve is normal in structure. There is no evidence of mitral valve regurgitation. Tricuspid Valve: The tricuspid valve is structurally normal. There is trace tricuspid regurgitation. The right ventricular systolic pressure is unable to be estimated. Pulmonic Valve: The pulmonic valve is structurally normal. There is physiologic pulmonic valve regurgitation. Pericardium: No pericardial effusion noted. Aorta: The aortic root is normal. Systemic Veins: The inferior vena cava appears normal in size, with IVC inspiratory collapse greater than 50%.  CONCLUSIONS:  1. The left ventricular systolic function is normal, with a visually estimated ejection fraction of 60-65%.  2. There is normal right ventricular global systolic function.  3. No evidence of mitral valve regurgitation.  4. Trace tricuspid regurgitation is visualized.  5. Mild aortic valve stenosis. QUANTITATIVE DATA SUMMARY:  2D MEASUREMENTS:           Normal Ranges: LAs:             3.00 cm   (2.7-4.0cm) IVSd:            0.74 cm   (0.6-1.1cm) LVPWd:           0.88 cm   (0.6-1.1cm) LVIDd:           4.07 cm   (3.9-5.9cm) LVIDs:           2.79 cm LV Mass Index:   56.9 g/m2 LV % FS          31.4 %  LA VOLUME:                   Normal Ranges: LA Vol A4C:        28.2 ml   (22+/-6mL/m2) LA Vol Index A4C:  16.4ml/m2 LA Area A4C:       11.8 cm2 LA Major Axis A4C: 4.2 cm LA Vol A4C:        24.5 ml   RA VOLUME BY A/L METHOD:            Normal Ranges: RA Vol A4C:              28.3 ml    (8.3-19.5ml) RA Vol Index A4C:        16.4 ml/m2 RA Area A4C:             12.7 cm2 RA Major Axis A4C:       4.8 cm  LV SYSTOLIC FUNCTION BY 2D PLANIMETRY (MOD):                      Normal Ranges: EF-A4C View:    67 % (>=55%) EF-A2C View:    40 % EF-Biplane:     57 % EF-Visual:      63 % LV EF Reported: 63 %  LV DIASTOLIC FUNCTION:           Normal Ranges: MV Peak E:             0.75 m/s  (0.7-1.2 m/s) MV Peak A:             1.16 m/s  (0.42-0.7 m/s) E/A Ratio:             0.65      (1.0-2.2) MV e'                  0.061 m/s (>8.0) MV lateral e'          0.06 m/s MV medial e'           0.06 m/s E/e' Ratio:            12.35     (<8.0)  MITRAL VALVE:         Normal Ranges: MV DT:        92 msec (150-240msec)  AORTIC VALVE:                      Normal Ranges: AoV Vmax:                2.49 m/s  (<=1.7m/s) AoV Peak P.8 mmHg (<20mmHg) AoV Mean P.0 mmHg (1.7-11.5mmHg) LVOT Max Wallace:            1.00 m/s  (<=1.1m/s) AoV VTI:                 52.50 cm  (18-25cm) LVOT VTI:                19.70 cm LVOT Diameter:           2.10 cm   (1.8-2.4cm) AoV Area, VTI:           1.30 cm2  (2.5-5.5cm2) AoV Area,Vmax:           1.39 cm2  (2.5-4.5cm2) AoV Dimensionless Index: 0.38  RIGHT VENTRICLE: TAPSE: 19.1 mm RV s'  0.16 m/s  TRICUSPID VALVE/RVSP:         Normal Ranges: IVC Diam:             1.26 cm  [id number] Hubert Vidales MD Electronically signed on 2025 at 1:27:30 PM  ** Final **     XR chest 1 view    Result Date: 2025  Interpreted By:  Radha Sandoval, STUDY: XR CHEST 1 VIEW 2025 11:57 am   INDICATION: Hyperglycemia   COMPARISON: 2025   ACCESSION NUMBER(S): [id number]   ORDERING CLINICIAN: BILLY ESPINAL   TECHNIQUE: AP semi-erect view of the chest   FINDINGS: The heart and mediastinum are normally visualized. The lungs are clear without pleural abnormality.       No acute cardiopulmonary disease.    Signed by: Radha Sandoval 1/22/2025 12:10 PM Dictation workstation:   [id number]    XR chest 1 view    Result Date: 1/21/2025  STUDY: Chest Radiograph;  01/21/2025, 3:18 PM. INDICATION: Tachypnea. COMPARISON: CT AP: 01/21/25 ACCESSION NUMBER(S): [id number] ORDERING CLINICIAN: RU BAIN TECHNIQUE:  Frontal chest was obtained at 15:18 hours. FINDINGS: CARDIOMEDIASTINAL SILHOUETTE: Cardiomediastinal silhouette is normal in size and configuration.  LUNGS: Lungs are clear.  ABDOMEN: No remarkable upper abdominal findings.  BONES: No acute osseous changes.    No acute disease. Signed by Ravindra Hood MD    CT abdomen pelvis w IV contrast    Result Date: 1/21/2025  STUDY: CT Abdomen and Pelvis with IV Contrast; 1/21/2025 14:02 INDICATION: Abdominal pain, vomiting, hyperglycemia. COMPARISON: None Available. ACCESSION NUMBER(S): [id number] ORDERING CLINICIAN: RU BAIN TECHNIQUE: CT of the abdomen and pelvis was performed.  Contiguous axial images were obtained at 3 mm slice thickness through the abdomen and pelvis. Coronal and sagittal reconstructions at 3 mm slice thickness were performed.  Omnipaque 350 75 mL was administered intravenously.  FINDINGS: Coronary artery calcifications are present.  No acute findings are seen within the liver.  The portal and hepatic veins are patent. There is no intrahepatic ductal dilatation.  The gallbladder is mildly distended.  No acute findings are seen within the spleen.  The splenic vein is patent.  There is atrophy of the pancreas.  No inflammatory changes are noted.  No adrenal nodule is noted.  There is no evidence of hydronephrosis.  No dilated loops of small bowel or colon are visualized.  There is wall thickening seen throughout the colon, indicative of pancolitis.  The appendix is normal in appearance.  No enlarged lymph nodes are seen in the pelvic sidewalls, iliac chains, or retroperitoneum.  There is no evidence of aneurysmal dilatation of the  abdominal aorta.  Diffuse plaque is seen throughout the abdominal aorta.  No compression fractures are visualized within the lumbar spine.  There is prominent distention of the urinary bladder.    1.  Pancolitis. 2.  Diffuse plaque throughout the abdominal aorta. 3.  Prominent distention of the urinary bladder. 4.  Coronary artery calcifications. Signed by Trevor Estrada MD           This patient currently has cardiac telemetry ordered; if you would like to modify or discontinue the telemetry order, click here to go to the orders activity to modify/discontinue the order.                  Assessment/Plan   Assessment & Plan  Dehydration    NSTEMI (non-ST elevated myocardial infarction) (Multi)    Diabetic ketoacidosis without coma associated with type 2 diabetes mellitus (Multi)    Pancolitis (Multi)    Vomiting    Bladder retention    Ambulatory dysfunction    Urinary retention.      Continue Flomax.  Monitor residuals.  Straight catheter as needed.  Follow up in the office after discharge.             I spent 22 minutes in the professional and overall care of this patient.      Omkar Polk MD

## 2025-01-27 NOTE — CARE PLAN
The clinical goals for the shift include hemodynamic stability and effective pain management.    Problem: Diabetes  Goal: Achieve decreasing blood glucose levels by end of shift  Outcome: Progressing  Goal: Increase stability of blood glucose readings by end of shift  Outcome: Progressing  Goal: Decrease in ketones present in urine by end of shift  Outcome: Progressing  Goal: Maintain electrolyte levels within acceptable range throughout shift  Outcome: Progressing  Goal: Maintain glucose levels >70mg/dl to <250mg/dl throughout shift  Outcome: Progressing  Goal: No changes in neurological exam by end of shift  Outcome: Progressing  Goal: Learn about and adhere to nutrition recommendations by end of shift  Outcome: Progressing  Goal: Vital signs within normal range for age by end of shift  Outcome: Progressing  Goal: Increase self care and/or family involovement by end of shift  Outcome: Progressing  Goal: Receive DSME education by end of shift  Outcome: Progressing     Problem: Pain - Adult  Goal: Verbalizes/displays adequate comfort level or baseline comfort level  Outcome: Progressing     Problem: Safety - Adult  Goal: Free from fall injury  Outcome: Progressing     Problem: Discharge Planning  Goal: Discharge to home or other facility with appropriate resources  Outcome: Progressing     Problem: Discharge Planning  Goal: Discharge to home or other facility with appropriate resources  Outcome: Progressing     Problem: Chronic Conditions and Co-morbidities  Goal: Patient's chronic conditions and co-morbidity symptoms are monitored and maintained or improved  Outcome: Progressing     Problem: Fall/Injury  Goal: Not fall by end of shift  Outcome: Progressing  Goal: Be free from injury by end of the shift  Outcome: Progressing  Goal: Verbalize understanding of personal risk factors for fall in the hospital  Outcome: Progressing  Goal: Verbalize understanding of risk factor reduction measures to prevent injury from  fall in the home  Outcome: Progressing  Goal: Use assistive devices by end of the shift  Outcome: Progressing  Goal: Pace activities to prevent fatigue by end of the shift  Outcome: Progressing     Problem: Pain  Goal: Takes deep breaths with improved pain control throughout the shift  Outcome: Progressing  Goal: Turns in bed with improved pain control throughout the shift  Outcome: Progressing  Goal: Walks with improved pain control throughout the shift  Outcome: Progressing  Goal: Performs ADL's with improved pain control throughout shift  Outcome: Progressing  Goal: Participates in PT with improved pain control throughout the shift  Outcome: Progressing  Goal: Free from opioid side effects throughout the shift  Outcome: Progressing  Goal: Free from acute confusion related to pain meds throughout the shift  Outcome: Progressing

## 2025-01-27 NOTE — CARE PLAN
The patient's goals for the shift include      The clinical goals for the shift include remain hemodynamically stable, monitor nausea    Over the shift, the patient did not make progress toward the following goals. Barriers to progression include NM stress in am . Recommendations to address these barriers include pt hemodynamically stable.

## 2025-01-27 NOTE — PROGRESS NOTES
"Devin Gomez is a 64 y.o. male on day 6 of admission presenting with Dehydration.    Subjective   Resting comfortably.  Denies chest pain.  No events on telemetry.       Objective     Physical Exam   Gen: NAD   Neck: no JVD, carotid upstroke is brisk and without delay   Heart: rrr, s1s2+ no mrg   Lungs: CTA   Ext: warm no edema  Last Recorded Vitals  Blood pressure 131/57, pulse 79, temperature 36.5 °C (97.7 °F), temperature source Temporal, resp. rate 22, height 1.753 m (5' 9\"), weight 61.4 kg (135 lb 4.8 oz), SpO2 90%.  Intake/Output last 3 Shifts:  I/O last 3 completed shifts:  In: - (0 mL/kg)   Out: 1275 (20.8 mL/kg) [Urine:1275 (0.6 mL/kg/hr)]  Weight: 61.4 kg       Assessment/Plan   Assessment & Plan  Dehydration    NSTEMI (non-ST elevated myocardial infarction) (Multi)    Diabetic ketoacidosis without coma associated with type 2 diabetes mellitus (Multi)    Pancolitis (Multi)    Vomiting    Bladder retention    Ambulatory dysfunction    1/27: We have seen a rise and fall in his high-sensitivity troponins despite any particular intervention for acute coronary syndrome.  That being said with known peripheral arterial disease it would be worth risk stratifying him with a myocardial SPECT stress test.  We can accomplish this tomorrow morning.  Will continue to follow.         Kimani Solano, DO      "

## 2025-01-27 NOTE — PROGRESS NOTES
Dvein Gomez is a 64 y.o. male on day 6 of admission presenting with Dehydration.      Subjective   He is doing well  Discussed stress test with cardiology this will be apparently done tomorrow Tuesday  Which will put off GI plans for upper lower endoscopy for Wednesday?   He had to have straight catheter again last night    Objective     Last Recorded Vitals  /57 (BP Location: Left arm, Patient Position: Lying)   Pulse 79   Temp 36.5 °C (97.7 °F) (Temporal)   Resp 22   Wt 61.4 kg (135 lb 4.8 oz)   SpO2 90%   Intake/Output last 3 Shifts:    Intake/Output Summary (Last 24 hours) at 1/27/2025 1033  Last data filed at 1/27/2025 0124  Gross per 24 hour   Intake --   Output 1275 ml   Net -1275 ml       Physical Exam  Alert oriented x 3 cooperative no distress  Chest clear  Heart regular  Abdomen soft nontender extremities no edema  Neurologic exam gross nonfocal  Relevant Results  Reviewed  Assessment/Plan     Assessment & Plan  Dehydration  Pancolitis  Complaining of stomach cramping  Abdomen pelvis showed pancolitis  Elevated lactate  IV fluids half-normal saline at 150 an hour  Clear liquid diet  Stool panel  Zosyn  Consult gastroenterology  Type 2 diabetes mellitus with hyperglycemia  Blood glucose 283 and will give 5 units IV insulin  Check ABG  Check blood glucose every 2 hours  Check BMP every 4 hours  High blood pressure without diagnosis of hypertension  Hydralazine for systolic blood pressure greater than 160  Diffuse abdominal aorta plaque  Consider vascular consult outpatient  Urinary bladder distention  Bladder scan for over 1100  Patient was straight cath  Will do bladder scans every 6 hours and straight cath for residual greater than 350 mL  DVT prophylaxis  Lovenox  NSTEMI (non-ST elevated myocardial infarction) (Multi)    Diabetic ketoacidosis without coma associated with type 2 diabetes mellitus (Multi)    Pancolitis (Multi)    Vomiting    Bladder retention    Ambulatory  dysfunction      January 27,    Stress test, then upper lobe endoscopy per GI, bladder retention straight cath as needed for now no evidence of UTI I have discontinued antibiotics             Ismael Knowles MD

## 2025-01-27 NOTE — PROGRESS NOTES
01/27/25 1149   Discharge Planning   Expected Discharge Disposition Home H   Does the patient need discharge transport arranged? No     MSW was advised patient needed help with rent and contacting his daughter. Met with patient and discussed the same. He states he has called his daughter but has been unable to reach her, he states he will keep trying. He is trying to reach her so apartment can be locked as it currently is not. Additionally, he states he is trying to reach his landlord to notify him of admission, in the event he is not home by the time rent is due on 2/1. Patient confirmed he has the fund for rent, just does not want to be late with the rent. Patient states he will keep trying his landlord as well and felt better talking it through with someone.     Patient plans to return home upon discharge and is agreeable to home care; plan is for  Home care upon discharge and patient will need internal home care referral.

## 2025-01-27 NOTE — PROGRESS NOTES
Occupational Therapy    Occupational Therapy Treatment    Name: Devin Gomez  MRN: [id number]  Department: [facility] 4 ICU  Room: [address] [place], [place] [address]  Date: 01/27/25  Time Calculation  Start Time: 1023  Stop Time: 1050  Time Calculation (min): 27 min    Assessment:  OT Assessment: Patient is progressing throughout POC and will benefit from continued skilled OT.  Prognosis: Good  Barriers to Discharge Home: Caregiver assistance, Physical needs  Caregiver Assistance: Patient lives alone and/or does not have reliable caregiver assistance  Physical Needs: Intermittent mobility assistance needed, Intermittent ADL assistance needed  Evaluation/Treatment Tolerance: Patient tolerated treatment well, Patient limited by fatigue  End of Session Communication: Bedside nurse  End of Session Patient Position: Up in chair, Alarm on  Plan:  Treatment Interventions: ADL retraining, Functional transfer training, UE strengthening/ROM, Endurance training, Compensatory technique education, Patient/family training  OT Frequency: 3 times per week  OT Discharge Recommendations: Low intensity level of continued care  Equipment Recommended upon Discharge: Wheeled walker, Wheelchair  OT Recommended Transfer Status: Stand by assist, Assistive equipment (Comment)  OT - OK to Discharge: Yes    Subjective   Previous Visit Info:  OT Last Visit  OT Received On: 01/27/25  General:  General  Reason for Referral: decline in ADLs; hyperglycemia, pancolitis, n/v/d  Referred By: Dr. Knowles  Past Medical History Relevant to Rehab: DM with neuropathy, PVD, HTN, hyperlipidemia  Family/Caregiver Present: No  Prior to Session Communication: Bedside nurse  Patient Position Received: Bed, 3 rail up, Alarm on  Preferred Learning Style: verbal, visual, auditory  General Comment: Patient is cleared by nursing for therapy. Patient in bed upon arrival and agreeable to participate.  Precautions:  Hearing/Visual Limitations: + glasses  Medical Precautions: Fall  precautions    Pain Assessment:  Pain Assessment  Pain Assessment: 0-10  0-10 (Numeric) Pain Score: 0 - No pain     Objective   Cognition:  Cognition Comments: cooperative. flat affect. able to follow commands throughout  Impulsive: Mildly  Activities of Daily Living: Grooming  Grooming Level of Assistance: Close supervision  Grooming Where Assessed: Standing sinkside  Grooming Comments: brush teeth    UE Bathing  UE Bathing Comments: patient reports he completed all other ADLs earlier with nursing staff assist    Functional Standing Tolerance:  Functional Standing Tolerance  Time: ~4 minutes  Activity: standing sinkside  Functional Standing Tolerance Comments: Close Supervision, unilateral support at all times  Bed Mobility/Transfers: Bed Mobility  Bed Mobility: Yes  Bed Mobility 1  Bed Mobility 1: Supine to sitting  Level of Assistance 1: Distant supervision  Bed Mobility Comments 1: x2 trials  Bed Mobility 2  Bed Mobility  2: Sitting to supine  Level of Assistance 2: Distant supervision    Transfers  Transfer: Yes  Transfer 1  Transfer From 1: Bed to  Transfer to 1: Stand  Technique 1: Sit to stand  Transfer Device 1: Walker  Transfer Level of Assistance 1: Close supervision  Trials/Comments 1: x2 trials    Functional Mobility:  Functional Mobility  Functional Mobility Performed: Yes  Functional Mobility 1  Surface 1: Level tile  Device 1: Standard walker  Assistance 1: Contact guard  Comments 1: to and from the bathroom  Sitting Balance:  Static Sitting Balance  Static Sitting-Balance Support: Feet supported, Bilateral upper extremity supported  Static Sitting-Level of Assistance: Close supervision  Standing Balance:  Static Standing Balance  Static Standing-Balance Support: Bilateral upper extremity supported  Static Standing-Level of Assistance: Close supervision  Dynamic Standing Balance  Dynamic Standing-Balance Support:  (unilateral UE support)  Dynamic Standing-Level of Assistance: Close  supervision  Dynamic Standing-Balance: Reaching for objects  Dynamic Standing-Comments: during ADLs standing sinkside    Therapy/Activity: Therapeutic Exercise  Therapeutic Exercise Performed: Yes  Therapeutic Exercise Activity 1: patient is seated in the chair and completes 1x10 B UE AROM exercises. Exercises include bicep curls, chest press, shoulder flex/ext, shoulder elevation/depression, scapular protraction/retraction, shoulder horizontal abduction/adduction.    RUE   RUE : Exceptions to WFL (generalized weakness) and LUE   LUE: Exceptions to WFL (generalized weakness)    Outcome Measures:  [facility] Daily Activity  Putting on and taking off regular lower body clothing: A little  Bathing (including washing, rinsing, drying): A little  Putting on and taking off regular upper body clothing: A little  Toileting, which includes using toilet, bedpan or urinal: A lot  Taking care of personal grooming such as brushing teeth: A little  Eating Meals: A little  Daily Activity - Total Score: 17    Education Documentation  Precautions, taught by Shruti Taylor OT at 1/27/2025 11:06 AM.  Learner: Patient  Readiness: Acceptance  Method: Explanation, Demonstration  Response: Needs Reinforcement  Comment: safe completion of ADLs and txfers    ADL Training, taught by Shruti Taylor OT at 1/27/2025 11:06 AM.  Learner: Patient  Readiness: Acceptance  Method: Explanation, Demonstration  Response: Needs Reinforcement  Comment: safe completion of ADLs and txfers    Education Comments  No comments found.      Goals:  Encounter Problems       Encounter Problems (Active)       OT Goals       ADLS (Progressing)       Start:  01/22/25    Expected End:  02/05/25       Patient will complete ADL tasks, with modified independence using AE need in order to increase patient's safety and independence with self-care tasks.           Functional Transfers (Progressing)       Start:  01/22/25    Expected End:  02/05/25       Patient will complete  functional transfers with modified independence using least restrictive device, in order to increase patient's safety and independence with daily tasks.           Activity Tolerance (Progressing)       Start:  01/22/25    Expected End:  02/05/25       Patient will demonstrate the ability to participate in functional activity at least >/= 20 minutes in order to increase patient's safety and independence with daily tasks.

## 2025-01-28 ENCOUNTER — APPOINTMENT (OUTPATIENT)
Dept: RADIOLOGY | Facility: HOSPITAL | Age: 65
End: 2025-01-28
Payer: COMMERCIAL

## 2025-01-28 ENCOUNTER — HOSPITAL ENCOUNTER (OUTPATIENT)
Dept: GASTROENTEROLOGY | Facility: HOSPITAL | Age: 65
Discharge: HOME | End: 2025-01-28
Payer: COMMERCIAL

## 2025-01-28 ENCOUNTER — ANESTHESIA (OUTPATIENT)
Dept: GASTROENTEROLOGY | Facility: HOSPITAL | Age: 65
End: 2025-01-28
Payer: COMMERCIAL

## 2025-01-28 ENCOUNTER — APPOINTMENT (OUTPATIENT)
Dept: GASTROENTEROLOGY | Facility: HOSPITAL | Age: 65
End: 2025-01-28
Payer: COMMERCIAL

## 2025-01-28 ENCOUNTER — ANESTHESIA EVENT (OUTPATIENT)
Dept: GASTROENTEROLOGY | Facility: HOSPITAL | Age: 65
End: 2025-01-28
Payer: COMMERCIAL

## 2025-01-28 ENCOUNTER — APPOINTMENT (OUTPATIENT)
Dept: CARDIOLOGY | Facility: HOSPITAL | Age: 65
End: 2025-01-28
Payer: COMMERCIAL

## 2025-01-28 VITALS
TEMPERATURE: 98.2 F | RESPIRATION RATE: 17 BRPM | SYSTOLIC BLOOD PRESSURE: 131 MMHG | DIASTOLIC BLOOD PRESSURE: 64 MMHG | OXYGEN SATURATION: 98 % | HEART RATE: 74 BPM

## 2025-01-28 DIAGNOSIS — K92.1 MELENA: ICD-10-CM

## 2025-01-28 LAB
ALBUMIN SERPL BCP-MCNC: 3.4 G/DL (ref 3.4–5)
ALBUMIN SERPL BCP-MCNC: 3.5 G/DL (ref 3.4–5)
ALP SERPL-CCNC: 56 U/L (ref 33–136)
ALP SERPL-CCNC: 57 U/L (ref 33–136)
ALT SERPL W P-5'-P-CCNC: 32 U/L (ref 10–52)
ALT SERPL W P-5'-P-CCNC: 32 U/L (ref 10–52)
ANION GAP SERPL CALCULATED.3IONS-SCNC: 12 MMOL/L (ref 10–20)
ANION GAP SERPL CALCULATED.3IONS-SCNC: 15 MMOL/L (ref 10–20)
AST SERPL W P-5'-P-CCNC: 22 U/L (ref 9–39)
AST SERPL W P-5'-P-CCNC: 23 U/L (ref 9–39)
B-OH-BUTYR SERPL-SCNC: 3.54 MMOL/L (ref 0.02–0.27)
BASOPHILS # BLD AUTO: 0.01 X10*3/UL (ref 0–0.1)
BASOPHILS NFR BLD AUTO: 0.2 %
BILIRUB SERPL-MCNC: 0.8 MG/DL (ref 0–1.2)
BILIRUB SERPL-MCNC: 0.8 MG/DL (ref 0–1.2)
BUN SERPL-MCNC: 25 MG/DL (ref 6–23)
BUN SERPL-MCNC: 27 MG/DL (ref 6–23)
CALCIUM SERPL-MCNC: 8.2 MG/DL (ref 8.6–10.3)
CALCIUM SERPL-MCNC: 8.3 MG/DL (ref 8.6–10.3)
CHLORIDE SERPL-SCNC: 95 MMOL/L (ref 98–107)
CHLORIDE SERPL-SCNC: 96 MMOL/L (ref 98–107)
CO2 SERPL-SCNC: 21 MMOL/L (ref 21–32)
CO2 SERPL-SCNC: 23 MMOL/L (ref 21–32)
CREAT SERPL-MCNC: 1.29 MG/DL (ref 0.5–1.3)
CREAT SERPL-MCNC: 1.44 MG/DL (ref 0.5–1.3)
EGFRCR SERPLBLD CKD-EPI 2021: 54 ML/MIN/1.73M*2
EGFRCR SERPLBLD CKD-EPI 2021: 61 ML/MIN/1.73M*2
EOSINOPHIL # BLD AUTO: 0.01 X10*3/UL (ref 0–0.7)
EOSINOPHIL NFR BLD AUTO: 0.2 %
ERYTHROCYTE [DISTWIDTH] IN BLOOD BY AUTOMATED COUNT: 12.5 % (ref 11.5–14.5)
GLUCOSE BLD MANUAL STRIP-MCNC: 154 MG/DL (ref 74–99)
GLUCOSE BLD MANUAL STRIP-MCNC: 192 MG/DL (ref 74–99)
GLUCOSE BLD MANUAL STRIP-MCNC: 236 MG/DL (ref 74–99)
GLUCOSE BLD MANUAL STRIP-MCNC: 295 MG/DL (ref 74–99)
GLUCOSE BLD MANUAL STRIP-MCNC: 585 MG/DL (ref 74–99)
GLUCOSE BLD MANUAL STRIP-MCNC: 591 MG/DL (ref 74–99)
GLUCOSE SERPL-MCNC: 254 MG/DL (ref 74–99)
GLUCOSE SERPL-MCNC: 508 MG/DL (ref 74–99)
HCT VFR BLD AUTO: 29.1 % (ref 41–52)
HGB BLD-MCNC: 10.1 G/DL (ref 13.5–17.5)
IMM GRANULOCYTES # BLD AUTO: 0.03 X10*3/UL (ref 0–0.7)
IMM GRANULOCYTES NFR BLD AUTO: 0.5 % (ref 0–0.9)
LYMPHOCYTES # BLD AUTO: 0.79 X10*3/UL (ref 1.2–4.8)
LYMPHOCYTES NFR BLD AUTO: 12.2 %
MCH RBC QN AUTO: 28.8 PG (ref 26–34)
MCHC RBC AUTO-ENTMCNC: 34.7 G/DL (ref 32–36)
MCV RBC AUTO: 83 FL (ref 80–100)
MONOCYTES # BLD AUTO: 0.53 X10*3/UL (ref 0.1–1)
MONOCYTES NFR BLD AUTO: 8.2 %
NEUTROPHILS # BLD AUTO: 5.08 X10*3/UL (ref 1.2–7.7)
NEUTROPHILS NFR BLD AUTO: 78.7 %
NRBC BLD-RTO: 0 /100 WBCS (ref 0–0)
PLATELET # BLD AUTO: 178 X10*3/UL (ref 150–450)
POTASSIUM SERPL-SCNC: 3.6 MMOL/L (ref 3.5–5.3)
POTASSIUM SERPL-SCNC: 4.1 MMOL/L (ref 3.5–5.3)
PROT SERPL-MCNC: 5.4 G/DL (ref 6.4–8.2)
PROT SERPL-MCNC: 5.7 G/DL (ref 6.4–8.2)
RBC # BLD AUTO: 3.51 X10*6/UL (ref 4.5–5.9)
SODIUM SERPL-SCNC: 126 MMOL/L (ref 136–145)
SODIUM SERPL-SCNC: 128 MMOL/L (ref 136–145)
WBC # BLD AUTO: 6.5 X10*3/UL (ref 4.4–11.3)

## 2025-01-28 PROCEDURE — 2500000004 HC RX 250 GENERAL PHARMACY W/ HCPCS (ALT 636 FOR OP/ED): Performed by: INTERNAL MEDICINE

## 2025-01-28 PROCEDURE — 0753T DGTZ GLS MCRSCP SLD LEVEL IV: CPT | Mod: TC,WESLAB | Performed by: INTERNAL MEDICINE

## 2025-01-28 PROCEDURE — 2500000002 HC RX 250 W HCPCS SELF ADMINISTERED DRUGS (ALT 637 FOR MEDICARE OP, ALT 636 FOR OP/ED): Performed by: INTERNAL MEDICINE

## 2025-01-28 PROCEDURE — 80053 COMPREHEN METABOLIC PANEL: CPT | Performed by: INTERNAL MEDICINE

## 2025-01-28 PROCEDURE — 99232 SBSQ HOSP IP/OBS MODERATE 35: CPT | Performed by: INTERNAL MEDICINE

## 2025-01-28 PROCEDURE — 7100000002 HC RECOVERY ROOM TIME - EACH INCREMENTAL 1 MINUTE

## 2025-01-28 PROCEDURE — 3700000001 HC GENERAL ANESTHESIA TIME - INITIAL BASE CHARGE

## 2025-01-28 PROCEDURE — 97530 THERAPEUTIC ACTIVITIES: CPT | Mod: GP

## 2025-01-28 PROCEDURE — 97110 THERAPEUTIC EXERCISES: CPT | Mod: GP

## 2025-01-28 PROCEDURE — A45380 PR COLONOSCOPY,BIOPSY

## 2025-01-28 PROCEDURE — 85025 COMPLETE CBC W/AUTO DIFF WBC: CPT | Performed by: INTERNAL MEDICINE

## 2025-01-28 PROCEDURE — 3430000001 HC RX 343 DIAGNOSTIC RADIOPHARMACEUTICALS: Performed by: INTERNAL MEDICINE

## 2025-01-28 PROCEDURE — 36415 COLL VENOUS BLD VENIPUNCTURE: CPT | Performed by: INTERNAL MEDICINE

## 2025-01-28 PROCEDURE — 82010 KETONE BODYS QUAN: CPT | Performed by: INTERNAL MEDICINE

## 2025-01-28 PROCEDURE — 51701 INSERT BLADDER CATHETER: CPT

## 2025-01-28 PROCEDURE — 78452 HT MUSCLE IMAGE SPECT MULT: CPT

## 2025-01-28 PROCEDURE — 2060000001 HC INTERMEDIATE ICU ROOM DAILY

## 2025-01-28 PROCEDURE — 7100000001 HC RECOVERY ROOM TIME - INITIAL BASE CHARGE

## 2025-01-28 PROCEDURE — 2500000004 HC RX 250 GENERAL PHARMACY W/ HCPCS (ALT 636 FOR OP/ED)

## 2025-01-28 PROCEDURE — A9502 TC99M TETROFOSMIN: HCPCS | Performed by: INTERNAL MEDICINE

## 2025-01-28 PROCEDURE — 82947 ASSAY GLUCOSE BLOOD QUANT: CPT

## 2025-01-28 PROCEDURE — 2500000001 HC RX 250 WO HCPCS SELF ADMINISTERED DRUGS (ALT 637 FOR MEDICARE OP): Performed by: INTERNAL MEDICINE

## 2025-01-28 PROCEDURE — A45380 PR COLONOSCOPY,BIOPSY: Performed by: ANESTHESIOLOGY

## 2025-01-28 PROCEDURE — 45380 COLONOSCOPY AND BIOPSY: CPT | Performed by: INTERNAL MEDICINE

## 2025-01-28 PROCEDURE — 3700000002 HC GENERAL ANESTHESIA TIME - EACH INCREMENTAL 1 MINUTE

## 2025-01-28 RX ORDER — FENTANYL CITRATE 50 UG/ML
50 INJECTION, SOLUTION INTRAMUSCULAR; INTRAVENOUS EVERY 5 MIN PRN
Status: ACTIVE | OUTPATIENT
Start: 2025-01-28 | End: 2025-01-28

## 2025-01-28 RX ORDER — MIDAZOLAM HYDROCHLORIDE 1 MG/ML
INJECTION, SOLUTION INTRAMUSCULAR; INTRAVENOUS AS NEEDED
Status: DISCONTINUED | OUTPATIENT
Start: 2025-01-28 | End: 2025-01-28

## 2025-01-28 RX ORDER — LIDOCAINE HYDROCHLORIDE 10 MG/ML
0.1 INJECTION, SOLUTION INFILTRATION; PERINEURAL ONCE
Status: DISCONTINUED | OUTPATIENT
Start: 2025-01-28 | End: 2025-01-29 | Stop reason: HOSPADM

## 2025-01-28 RX ORDER — PROPOFOL 10 MG/ML
INJECTION, EMULSION INTRAVENOUS AS NEEDED
Status: DISCONTINUED | OUTPATIENT
Start: 2025-01-28 | End: 2025-01-28

## 2025-01-28 RX ORDER — SODIUM CHLORIDE, SODIUM LACTATE, POTASSIUM CHLORIDE, CALCIUM CHLORIDE 600; 310; 30; 20 MG/100ML; MG/100ML; MG/100ML; MG/100ML
100 INJECTION, SOLUTION INTRAVENOUS CONTINUOUS
Status: ACTIVE | OUTPATIENT
Start: 2025-01-28 | End: 2025-01-28

## 2025-01-28 RX ORDER — MIDAZOLAM HYDROCHLORIDE 1 MG/ML
1 INJECTION, SOLUTION INTRAMUSCULAR; INTRAVENOUS ONCE AS NEEDED
Status: ACTIVE | OUTPATIENT
Start: 2025-01-28 | End: 2025-01-28

## 2025-01-28 RX ORDER — PHENYLEPHRINE HCL IN 0.9% NACL 1 MG/10 ML
SYRINGE (ML) INTRAVENOUS AS NEEDED
Status: DISCONTINUED | OUTPATIENT
Start: 2025-01-28 | End: 2025-01-28

## 2025-01-28 RX ORDER — INSULIN LISPRO 100 [IU]/ML
0-15 INJECTION, SOLUTION INTRAVENOUS; SUBCUTANEOUS EVERY 4 HOURS
Status: DISCONTINUED | OUTPATIENT
Start: 2025-01-28 | End: 2025-02-03

## 2025-01-28 RX ORDER — ALBUTEROL SULFATE 0.83 MG/ML
2.5 SOLUTION RESPIRATORY (INHALATION) ONCE AS NEEDED
Status: DISPENSED | OUTPATIENT
Start: 2025-01-28 | End: 2025-01-28

## 2025-01-28 RX ORDER — ONDANSETRON HYDROCHLORIDE 2 MG/ML
4 INJECTION, SOLUTION INTRAVENOUS ONCE AS NEEDED
Status: ACTIVE | OUTPATIENT
Start: 2025-01-28 | End: 2025-01-28

## 2025-01-28 RX ORDER — HYDRALAZINE HYDROCHLORIDE 20 MG/ML
5 INJECTION INTRAMUSCULAR; INTRAVENOUS EVERY 30 MIN PRN
Status: DISPENSED | OUTPATIENT
Start: 2025-01-28 | End: 2025-01-28

## 2025-01-28 RX ORDER — LIDOCAINE HYDROCHLORIDE 10 MG/ML
INJECTION, SOLUTION INFILTRATION; PERINEURAL AS NEEDED
Status: DISCONTINUED | OUTPATIENT
Start: 2025-01-28 | End: 2025-01-28

## 2025-01-28 RX ORDER — REGADENOSON 0.08 MG/ML
0.4 INJECTION, SOLUTION INTRAVENOUS ONCE
Status: COMPLETED | OUTPATIENT
Start: 2025-01-28 | End: 2025-01-28

## 2025-01-28 RX ORDER — SODIUM CHLORIDE, SODIUM LACTATE, POTASSIUM CHLORIDE, CALCIUM CHLORIDE 600; 310; 30; 20 MG/100ML; MG/100ML; MG/100ML; MG/100ML
INJECTION, SOLUTION INTRAVENOUS CONTINUOUS PRN
Status: DISCONTINUED | OUTPATIENT
Start: 2025-01-28 | End: 2025-01-28

## 2025-01-28 RX ADMIN — INSULIN LISPRO 9 UNITS: 100 INJECTION, SOLUTION INTRAVENOUS; SUBCUTANEOUS at 16:00

## 2025-01-28 RX ADMIN — Medication 50 MCG: at 10:29

## 2025-01-28 RX ADMIN — MIDAZOLAM 1 MG: 1 INJECTION INTRAMUSCULAR; INTRAVENOUS at 10:19

## 2025-01-28 RX ADMIN — PROPOFOL 20 MG: 10 INJECTION, EMULSION INTRAVENOUS at 10:22

## 2025-01-28 RX ADMIN — SODIUM CHLORIDE, POTASSIUM CHLORIDE, SODIUM LACTATE AND CALCIUM CHLORIDE: 600; 310; 30; 20 INJECTION, SOLUTION INTRAVENOUS at 10:11

## 2025-01-28 RX ADMIN — PANTOPRAZOLE SODIUM 40 MG: 40 INJECTION, POWDER, FOR SOLUTION INTRAVENOUS at 21:56

## 2025-01-28 RX ADMIN — METOCLOPRAMIDE HYDROCHLORIDE 10 MG: 5 INJECTION INTRAMUSCULAR; INTRAVENOUS at 05:48

## 2025-01-28 RX ADMIN — LIDOCAINE HYDROCHLORIDE 4 ML: 10 INJECTION, SOLUTION INFILTRATION; PERINEURAL at 10:16

## 2025-01-28 RX ADMIN — HEPARIN SODIUM 5000 UNITS: 5000 INJECTION, SOLUTION INTRAVENOUS; SUBCUTANEOUS at 05:46

## 2025-01-28 RX ADMIN — INSULIN LISPRO 15 UNITS: 100 INJECTION, SOLUTION INTRAVENOUS; SUBCUTANEOUS at 05:48

## 2025-01-28 RX ADMIN — INSULIN LISPRO 15 UNITS: 100 INJECTION, SOLUTION INTRAVENOUS; SUBCUTANEOUS at 02:19

## 2025-01-28 RX ADMIN — TETROFOSMIN 32.4 MILLICURIE: 0.23 INJECTION, POWDER, LYOPHILIZED, FOR SOLUTION INTRAVENOUS at 13:45

## 2025-01-28 RX ADMIN — PROPOFOL 40 MG: 10 INJECTION, EMULSION INTRAVENOUS at 10:19

## 2025-01-28 RX ADMIN — INSULIN LISPRO 6 UNITS: 100 INJECTION, SOLUTION INTRAVENOUS; SUBCUTANEOUS at 21:48

## 2025-01-28 RX ADMIN — METOPROLOL TARTRATE 50 MG: 50 TABLET, FILM COATED ORAL at 21:55

## 2025-01-28 RX ADMIN — HEPARIN SODIUM 5000 UNITS: 5000 INJECTION, SOLUTION INTRAVENOUS; SUBCUTANEOUS at 21:55

## 2025-01-28 RX ADMIN — METOCLOPRAMIDE HYDROCHLORIDE 10 MG: 5 INJECTION INTRAMUSCULAR; INTRAVENOUS at 00:35

## 2025-01-28 RX ADMIN — REGADENOSON 0.4 MG: 0.08 INJECTION, SOLUTION INTRAVENOUS at 13:54

## 2025-01-28 RX ADMIN — PROPOFOL 20 MG: 10 INJECTION, EMULSION INTRAVENOUS at 10:31

## 2025-01-28 RX ADMIN — METOCLOPRAMIDE HYDROCHLORIDE 10 MG: 5 INJECTION INTRAMUSCULAR; INTRAVENOUS at 23:37

## 2025-01-28 RX ADMIN — PROPOFOL 20 MG: 10 INJECTION, EMULSION INTRAVENOUS at 10:26

## 2025-01-28 RX ADMIN — ATORVASTATIN CALCIUM 40 MG: 40 TABLET, FILM COATED ORAL at 21:56

## 2025-01-28 RX ADMIN — Medication 100 MCG: at 10:28

## 2025-01-28 RX ADMIN — PROPOFOL 20 MG: 10 INJECTION, EMULSION INTRAVENOUS at 10:34

## 2025-01-28 SDOH — HEALTH STABILITY: MENTAL HEALTH: CURRENT SMOKER: 0

## 2025-01-28 ASSESSMENT — COGNITIVE AND FUNCTIONAL STATUS - GENERAL
STANDING UP FROM CHAIR USING ARMS: A LITTLE
TURNING FROM BACK TO SIDE WHILE IN FLAT BAD: A LITTLE
MOBILITY SCORE: 15
WALKING IN HOSPITAL ROOM: A LOT
MOVING FROM LYING ON BACK TO SITTING ON SIDE OF FLAT BED WITH BEDRAILS: A LITTLE
MOVING TO AND FROM BED TO CHAIR: A LITTLE
CLIMB 3 TO 5 STEPS WITH RAILING: TOTAL

## 2025-01-28 ASSESSMENT — PAIN SCALES - GENERAL
PAINLEVEL_OUTOF10: 0 - NO PAIN
PAINLEVEL_OUTOF10: 6
PAINLEVEL_OUTOF10: 0 - NO PAIN
PAIN_LEVEL: 2
PAINLEVEL_OUTOF10: 0 - NO PAIN
PAINLEVEL_OUTOF10: 8

## 2025-01-28 NOTE — PERIOPERATIVE NURSING NOTE
[facility] at bedside. Report given. Patient transferred to [facility] Status Stable.

## 2025-01-28 NOTE — PERIOPERATIVE NURSING NOTE
Report given to Sheryl Gonzalez RN [facility]. [facility] called for transport back to [facility].

## 2025-01-28 NOTE — PROGRESS NOTES
"Devin Gomez is a 64 y.o. male on day 7 of admission presenting with Dehydration.    Subjective   Resting comfortably.  Denies chest pain.  Was prepped yesterday and is heading over to [place] for EGD/colonoscopy today.       Objective     Physical Exam   Gen: NAD   Neck: no JVD, carotid upstroke is brisk and without delay   Heart: rrr, s1s2+ no mrg   Lungs: CTA   Ext: warm no edema    Last Recorded Vitals  Blood pressure 127/60, pulse 68, temperature 36.7 °C (98.1 °F), resp. rate 20, height 1.753 m (5' 9\"), weight 59.5 kg (131 lb 2.8 oz), SpO2 98%.  Intake/Output last 3 Shifts:  I/O last 3 completed shifts:  In: 1985 (33.4 mL/kg) [P.O.:1985]  Out: 2432 (40.9 mL/kg) [Urine:2432 (1.1 mL/kg/hr)]  Weight: 59.5 kg       Assessment/Plan   Assessment & Plan  Dehydration    NSTEMI (non-ST elevated myocardial infarction) (Multi)    Diabetic ketoacidosis without coma associated with type 2 diabetes mellitus (Multi)    Pancolitis (Multi)    Vomiting    Bladder retention    Ambulatory dysfunction    1/27: We have seen a rise and fall in his high-sensitivity troponins despite any particular intervention for acute coronary syndrome. That being said with known peripheral arterial disease it would be worth risk stratifying him with a myocardial SPECT stress test. We can accomplish this tomorrow morning. Will continue to follow.    1/28: Plan was initially for a Lexiscan SPECT stress test today however he is headed to [place] for endoscopy.  Will try to coordinate this with cardiopulmonary perhaps he can get his stress test while he is there versus when he comes back.         iKmani Solano, DO      "

## 2025-01-28 NOTE — ANESTHESIA POSTPROCEDURE EVALUATION
Patient: Devin Gomez    Procedure Summary       Date: 01/28/25 Room / Location: [facility]    Anesthesia Start: 1011 Anesthesia Stop: 1048    Procedures:       EGD      COLONOSCOPY Diagnosis: Melena    Scheduled Providers: Jesus Kren MD; Rudy Oscar MD; ELHAM Lopes-CRNA Responsible Provider: Rudy Oscar MD    Anesthesia Type: MAC ASA Status: 3            Anesthesia Type: MAC    Vitals Value Taken Time   /69 01/28/25 1152   Temp 36.8 °C (98.2 °F) 01/28/25 1120   Pulse 74 01/28/25 1204   Resp 12 01/28/25 1204   SpO2 98 % 01/28/25 1204   Vitals shown include unfiled device data.    Anesthesia Post Evaluation    Patient location during evaluation: PACU  Patient participation: complete - patient participated  Level of consciousness: sleepy but conscious  Pain score: 2  Pain management: adequate  Multimodal analgesia pain management approach  Airway patency: patent  Cardiovascular status: acceptable  Respiratory status: acceptable  Hydration status: acceptable  Postoperative Nausea and Vomiting: none        No notable events documented.

## 2025-01-28 NOTE — PROGRESS NOTES
Physical Therapy    Physical Therapy Treatment    Patient Name: Devin Gomez  MRN: [id number]  Department: [facility] 4 ICU  Room: [address] [place], [place] [address]  Today's Date: 1/28/2025  Time Calculation  Start Time: 1625  Stop Time: 1700  Time Calculation (min): 35 min         Assessment/Plan   PT Assessment  PT Assessment Results: Decreased strength, Decreased endurance, Impaired balance, Decreased mobility, Decreased coordination, Decreased safety awareness, Pain  Rehab Prognosis: Fair  Barriers to Discharge Home: Physical needs, Caregiver assistance  Caregiver Assistance: Patient lives alone and/or does not have reliable caregiver assistance  Physical Needs: Intermittent mobility assistance needed  Evaluation/Treatment Tolerance: Patient limited by fatigue, Patient limited by pain  Medical Staff Made Aware: Yes  Strengths: Premorbid level of function  Barriers to Participation: Comorbidities  Assessment Comment: Limited tolerance this session, gives effort as able. Pt required cues for safe mobility technique.  End of Session Patient Position: Up in chair, Alarm on  PT Plan  Inpatient/Swing Bed or Outpatient: Inpatient  PT Plan  Treatment/Interventions: Bed mobility, Transfer training, Gait training, Balance training, Strengthening, Endurance training, Therapeutic exercise, Therapeutic activity  PT Plan: Ongoing PT  PT Frequency: 3 times per week  PT Discharge Recommendations: Moderate intensity level of continued care  Equipment Recommended upon Discharge: Wheeled walker, Wheelchair  PT Recommended Transfer Status: Assist x1, Assistive device  PT - OK to Discharge: Yes      General Visit Information:   PT  Visit  PT Received On: 01/28/25  General  Reason for Referral: impaired mobility, dehydration  Referred By: Dr. Knowles  Past Medical History Relevant to Rehab: DM with neuropathy, PVD, HTN, hyperlipidemia  Family/Caregiver Present: No  Prior to Session Communication: Bedside nurse  Patient Position Received: Bed, 3 rail up, Alarm  off, not on at start of session  Preferred Learning Style: verbal, visual  General Comment: Pt agreeable to PT session. Reports having a busy day w/ tests and procedures.    Subjective   Precautions:  Precautions  Medical Precautions: Fall precautions      Objective   Pain:  Pain Assessment  Pain Assessment: 0-10  0-10 (Numeric) Pain Score: 8  Pain Type: Acute pain  Pain Location: Hip  Pain Orientation: Left  Pain Interventions: Repositioned, Ambulation/increased activity (pt states will take pain med prn prior to going to bed.)  Response to Interventions: Decrease in pain  Cognition:  Cognition  Overall Cognitive Status: Within Functional Limits  Cognition Comments: pleasant, cooperative  Insight: Mild  Impulsive: Within functional limits     Postural Control:  Dynamic Standing Balance  Dynamic Standing-Balance Support: Bilateral upper extremity supported  Dynamic Standing-Level of Assistance: Contact guard  Dynamic Standing-Balance: Turning (steps at bedside)  Dynamic Standing-Comments: Fair- (limited activity)    Activity Tolerance:  Activity Tolerance  Endurance: Decreased tolerance for upright activites  Activity Tolerance Comments: Fair-  Treatments:  Therapeutic Exercise  Therapeutic Exercise Activity 1: pt performed supine Lt ankle pump Rt (minimal ROM Lt), seated glute sets, Rt hip flexion, Lt heel slide, LAQs, debra hip add and abduction x 15 reps. Cues to stay on task bad for technique    Therapeutic Activity  Therapeutic Activity 1: including bed mobility, transfers and limited amb .    Bed Mobility 1  Bed Mobility 1: Supine to sitting  Level of Assistance 1: Close supervision  Bed Mobility Comments 1: to Rt EOB w/ HOB elevated.    Ambulation/Gait Training 1  Surface 1: Level tile  Device 1: Standard walker  Assistance 1: Minimum assistance  Comments/Distance (ft) 1: pt amb ~3- 4' to bedside chair. forward rounded posture, cues for position in SW. Pt declined further amb due to Lt hip pain and fatigue. Pt  shifts weight anteriorly on feet to avoid pressure and weight through heels.  Transfer 1  Technique 1: Sit to stand, Stand to sit  Transfer Device 1: Walker  Transfer Level of Assistance 1: Minimum assistance  Trials/Comments 1: from EOB, to chair w/ arms x 2, from chair w/ arms x 2. Cues and practice for safe hand placement w/ both sit <> stand. Improved w/ practice and proper technique, Pt declined amb, able to tolerate standing w/ forward weight shift a a couple steps in place; on feet ~ 1.5 minutes.    Outcome Measures:  [facility] Basic Mobility  Turning from your back to your side while in a flat bed without using bedrails: A little  Moving from lying on your back to sitting on the side of a flat bed without using bedrails: A little  Moving to and from bed to chair (including a wheelchair): A little  Standing up from a chair using your arms (e.g. wheelchair or bedside chair): A little  To walk in hospital room: A lot  Climbing 3-5 steps with railing: Total  Basic Mobility - Total Score: 15    Education Documentation  Precautions, taught by Carla Beck, PT at 1/28/2025  6:04 PM.  Learner: Patient  Readiness: Acceptance  Method: Explanation, Demonstration  Response: Verbalizes Understanding, Needs Reinforcement  Comment: safe technique    Home Exercise Program, taught by Carla Beck PT at 1/28/2025  6:04 PM.  Learner: Patient  Readiness: Acceptance  Method: Explanation, Demonstration  Response: Verbalizes Understanding, Needs Reinforcement  Comment: safe technique    Mobility Training, taught by Carla Beck PT at 1/28/2025  6:04 PM.  Learner: Patient  Readiness: Acceptance  Method: Explanation, Demonstration  Response: Verbalizes Understanding, Needs Reinforcement  Comment: safe technique    Education Comments  No comments found.        OP EDUCATION:       Encounter Problems       Encounter Problems (Active)       Balance       dynamic (Progressing)       Start:  01/22/25    Expected End:  02/05/25        Pt will perform Tinetti assessment and score >/= 24/28, resulting in a low falls risk             Mobility       LTG - Patient will be able to go up and down a curb/step with the appropriate device (Progressing)       Start:  01/22/25    Expected End:  02/05/25            LTG - Patient will navigate 4-6 steps with rails/device (Progressing)       Start:  01/22/25    Expected End:  02/05/25            ambulation (Progressing)       Start:  01/22/25    Expected End:  02/05/25       Pt will amb > 100 ft with wheeled walker/LRAD and mod independence          endurance (Progressing)       Start:  01/22/25    Expected End:  02/05/25       Pt will tolerate > 20 minutes of activity with stable vital signs and decreased c/o SOB/nausea.            PT Transfers       sit to stand (Progressing)       Start:  01/22/25    Expected End:  02/05/25       Pt will perform sit to stand transfer with LRAD and mod independence.             Pain - Adult          Safety       LTG - Patient will utilize safety techniques (Progressing)       Start:  01/22/25    Expected End:  02/05/25

## 2025-01-28 NOTE — PROGRESS NOTES
Devin Gomez is a 64 y.o. male on day 7 of admission presenting with Dehydration.      Subjective   Patient was missing for the first half of the day at [facility] for upper lower endoscopies.  He is currently in stress test where I saw him.  He has no complaints he appears comfortable he tells me the blood sugar has come down to below 200       Objective     Last Recorded Vitals  /71 (BP Location: Left arm, Patient Position: Lying)   Pulse 80   Temp 36.6 °C (97.9 °F) (Temporal)   Resp 16   Wt 59.5 kg (131 lb 2.8 oz)   SpO2 97%   Intake/Output last 3 Shifts:    Intake/Output Summary (Last 24 hours) at 1/28/2025 1459  Last data filed at 1/28/2025 1048  Gross per 24 hour   Intake 1525 ml   Output 1100 ml   Net 425 ml       Physical Exam  Alert oriented x 3 cooperative no distress  Chest clear  Heart regular  Abdomen soft nontender  Extremities no edema  Neurologic exam gross nonfocal  Relevant Results  A.m. labs reviewed significant for hyperglycemia acute renal failure repeat labs pending  Assessment/Plan     Assessment & Plan  Dehydration  Pancolitis  Complaining of stomach cramping  Abdomen pelvis showed pancolitis  Elevated lactate  IV fluids half-normal saline at 150 an hour  Clear liquid diet  Stool panel  Zosyn  Consult gastroenterology  Type 2 diabetes mellitus with hyperglycemia  Blood glucose 283 and will give 5 units IV insulin  Check ABG  Check blood glucose every 2 hours  Check BMP every 4 hours  High blood pressure without diagnosis of hypertension  Hydralazine for systolic blood pressure greater than 160  Diffuse abdominal aorta plaque  Consider vascular consult outpatient  Urinary bladder distention  Bladder scan for over 1100  Patient was straight cath  Will do bladder scans every 6 hours and straight cath for residual greater than 350 mL  DVT prophylaxis  Lovenox  NSTEMI (non-ST elevated myocardial infarction) (Multi)    Diabetic ketoacidosis without coma associated with type 2 diabetes  mellitus (Multi)    Pancolitis (Multi)    Vomiting    Bladder retention    Ambulatory dysfunction      January 28,     Appears stable however labs suggested hypoglycemia and dehydration renal failure again.  Will complete stress test or repeat labs and then will reassess need for IV fluids otherwise appears clinically in no distress             Ismael Knowles MD

## 2025-01-28 NOTE — CARE PLAN
The clinical goals for the shift include  patient to remain hemodynamically stable with well controlled blood sugar level.  Problem: Diabetes  Goal: Achieve decreasing blood glucose levels by end of shift  1/28/2025 0450 by Celena Hansen RN  Outcome: Progressing  1/28/2025 0449 by Celena Hansen RN  Outcome: Progressing  Flowsheets (Taken 1/28/2025 0449)  Achieve decreasing blood glucose levels by end of shift:   Med administration/monitoring of effect   Self monitor blood glucose with staff oversight  1/28/2025 0447 by Celena Hansen RN  Outcome: Progressing     Problem: Diabetes  Goal: Maintain electrolyte levels within acceptable range throughout shift  1/28/2025 0450 by Celena Hansen RN  Outcome: Progressing  1/28/2025 0449 by Celena Hansen RN  Outcome: Progressing  1/28/2025 0447 by Celena Hansen RN  Outcome: Progressing

## 2025-01-28 NOTE — CARE PLAN
Problem: Diabetes  Goal: Achieve decreasing blood glucose levels by end of shift  Outcome: Progressing  Goal: Increase stability of blood glucose readings by end of shift  Outcome: Progressing  Goal: Decrease in ketones present in urine by end of shift  Outcome: Progressing  Goal: Maintain electrolyte levels within acceptable range throughout shift  Outcome: Progressing  Goal: Maintain glucose levels >70mg/dl to <250mg/dl throughout shift  Outcome: Progressing  Goal: No changes in neurological exam by end of shift  Outcome: Progressing  Goal: Learn about and adhere to nutrition recommendations by end of shift  Outcome: Progressing  Goal: Vital signs within normal range for age by end of shift  Outcome: Progressing  Goal: Increase self care and/or family involovement by end of shift  Outcome: Progressing  Goal: Receive DSME education by end of shift  Outcome: Progressing     Problem: Pain - Adult  Goal: Verbalizes/displays adequate comfort level or baseline comfort level  Outcome: Progressing     Problem: Safety - Adult  Goal: Free from fall injury  Outcome: Progressing     Problem: Discharge Planning  Goal: Discharge to home or other facility with appropriate resources  Outcome: Progressing     Problem: Chronic Conditions and Co-morbidities  Goal: Patient's chronic conditions and co-morbidity symptoms are monitored and maintained or improved  Outcome: Progressing     Problem: Fall/Injury  Goal: Not fall by end of shift  Outcome: Progressing  Goal: Be free from injury by end of the shift  Outcome: Progressing  Goal: Verbalize understanding of personal risk factors for fall in the hospital  Outcome: Progressing  Goal: Verbalize understanding of risk factor reduction measures to prevent injury from fall in the home  Outcome: Progressing  Goal: Use assistive devices by end of the shift  Outcome: Progressing  Goal: Pace activities to prevent fatigue by end of the shift  Outcome: Progressing     Problem: Pain  Goal:  Takes deep breaths with improved pain control throughout the shift  Outcome: Progressing  Goal: Turns in bed with improved pain control throughout the shift  Outcome: Progressing  Goal: Walks with improved pain control throughout the shift  Outcome: Progressing  Goal: Performs ADL's with improved pain control throughout shift  Outcome: Progressing  Goal: Participates in PT with improved pain control throughout the shift  Outcome: Progressing  Goal: Free from opioid side effects throughout the shift  Outcome: Progressing  Goal: Free from acute confusion related to pain meds throughout the shift  Outcome: Progressing   The patient's goals for the shift include      The clinical goals for the shift include stable hemodynamics    Over the shift, the patient did not make progress toward the following goals. Barriers to progression include weakness. Recommendations to address these barriers include pt/ot.

## 2025-01-28 NOTE — PROGRESS NOTES
Status post EGD/colonoscopy    Colonoscopy showed an unusual appearance to the mucosa, more so proximally, with pale or whitish areas that appeared submucosal, ? Fatty changes? ,with targeted biopsies taken in the ascending and transverse colon.     EGD showed Abnormal mucosa in the antrum, consistent with gastritis; performed cold forceps biopsy to rule out H. pylori     H&H has remained stable at 10.1.  He currently has no overt bleeding.  Patient remains afebrile.  No leukocytosis. No other acute GI interventions.  Patient may follow-up as an outpatient.   GI will sign off

## 2025-01-28 NOTE — PERIOPERATIVE NURSING NOTE
Patient received to [facility] # 5 from Endo. Anesthesia at bedside. Report received. Initial assessment complete. VSS on Cardiac Monitor. Patient appears comfortable at present. No S/S of pain or resp. Distress noted.

## 2025-01-28 NOTE — PROGRESS NOTES
01/28/25 1520   Discharge Planning   Home or Post Acute Services In home services;Post acute facilities (Rehab/SNF/etc)  (INTERNAL referral for [facility] vs [facility] SNF.  Patient has been accepted by [facility].  Will need precert for SNF if appropriate.  Care Transitions following for updates.)   Type of Post Acute Facility Services Skilled nursing   Type of Home Care Services Home PT;Home OT   Expected Discharge Disposition Othe  (TBD)     Patient remains off the unit to discuss discharge options.  Patient agreeable to [facility] at discharge.  INTERNAL referral needed for home care.  If patient prefers SNF at discharge, [facility] ([facility] accepted patient.  Patient will need precert for SNF prior to discharge if appropriate next LOC.  Care Transitions following for updates/changes to discharge needs/plan.

## 2025-01-28 NOTE — PROGRESS NOTES
Occupational Therapy                 Therapy Communication Note    Patient Name: Devin Gomez  MRN: [id number]  Department: [facility] NONV1  Room: [address] [place] [address]  Today's Date: 1/28/2025     Discipline: Occupational Therapy    OT Missed Visit: Yes     Missed Visit Reason: Missed Visit Reason: Other (Comment) (Pt is curerntly off floor for testing)    Missed Time: Attempt    Comment:

## 2025-01-28 NOTE — ANESTHESIA PREPROCEDURE EVALUATION
Patient: Devin Gomez    Procedure Information       Date/Time: 01/28/25 0971    Scheduled providers: Jesus Kern MD; Rudy Oscar MD; ANA Lopes    Procedures:       EGD      COLONOSCOPY    Location: [facility]            Relevant Problems   Cardiac   (+) NSTEMI (non-ST elevated myocardial infarction) (Multi)      GI   (+) Pancolitis (Multi)       Clinical information reviewed:                   NPO Detail:  No data recorded     Physical Exam    Airway  Mallampati: II  TM distance: >3 FB  Neck ROM: full     Cardiovascular - normal exam     Dental - normal exam     Pulmonary - normal exam     Abdominal - normal exam             Anesthesia Plan    History of general anesthesia?: yes  History of complications of general anesthesia?: no    ASA 3     MAC     The patient is not a current smoker.  Patient was not previously instructed to abstain from smoking on day of procedure.  Patient did not smoke on day of procedure.  Education provided regarding risk of obstructive sleep apnea.  intravenous induction   Postoperative administration of opioids is intended.  Trial extubation is planned.  Anesthetic plan and risks discussed with patient.  Use of blood products discussed with patient who consented to blood products.    Plan discussed with CAA, attending and CRNA.

## 2025-01-28 NOTE — SIGNIFICANT EVENT
Apparently patient is off the floor went to [facility] to have upper lower endoscopy...  Reviewed a.m. labs which show now hyperglycemia with renal failure....  Discussed with nurse to notify me as soon as the patient comes back and I have ordered repeat stat labs

## 2025-01-29 ENCOUNTER — APPOINTMENT (OUTPATIENT)
Dept: RADIOLOGY | Facility: HOSPITAL | Age: 65
End: 2025-01-29
Payer: COMMERCIAL

## 2025-01-29 ENCOUNTER — APPOINTMENT (OUTPATIENT)
Dept: CARDIOLOGY | Facility: HOSPITAL | Age: 65
End: 2025-01-29
Payer: COMMERCIAL

## 2025-01-29 ENCOUNTER — APPOINTMENT (OUTPATIENT)
Dept: NEUROLOGY | Facility: HOSPITAL | Age: 65
End: 2025-01-29
Payer: COMMERCIAL

## 2025-01-29 PROBLEM — R55 SYNCOPE AND COLLAPSE: Status: ACTIVE | Noted: 2025-01-29

## 2025-01-29 LAB
ALBUMIN SERPL BCP-MCNC: 3.3 G/DL (ref 3.4–5)
ALBUMIN SERPL BCP-MCNC: 3.4 G/DL (ref 3.4–5)
ALP SERPL-CCNC: 54 U/L (ref 33–136)
ALP SERPL-CCNC: 56 U/L (ref 33–136)
ALT SERPL W P-5'-P-CCNC: 29 U/L (ref 10–52)
ALT SERPL W P-5'-P-CCNC: 29 U/L (ref 10–52)
ANION GAP SERPL CALCULATED.3IONS-SCNC: 13 MMOL/L (ref 10–20)
ANION GAP SERPL CALCULATED.3IONS-SCNC: 17 MMOL/L (ref 10–20)
ANION GAP SERPL CALCULATED.3IONS-SCNC: 25 MMOL/L (ref 10–20)
AST SERPL W P-5'-P-CCNC: 20 U/L (ref 9–39)
AST SERPL W P-5'-P-CCNC: 21 U/L (ref 9–39)
BASOPHILS # BLD AUTO: 0.05 X10*3/UL (ref 0–0.1)
BASOPHILS NFR BLD AUTO: 0.9 %
BILIRUB SERPL-MCNC: 1 MG/DL (ref 0–1.2)
BILIRUB SERPL-MCNC: 1.2 MG/DL (ref 0–1.2)
BNP SERPL-MCNC: 125 PG/ML (ref 0–99)
BUN SERPL-MCNC: 23 MG/DL (ref 6–23)
BUN SERPL-MCNC: 29 MG/DL (ref 6–23)
BUN SERPL-MCNC: 32 MG/DL (ref 6–23)
CALCIUM SERPL-MCNC: 8.2 MG/DL (ref 8.6–10.3)
CALCIUM SERPL-MCNC: 8.3 MG/DL (ref 8.6–10.3)
CALCIUM SERPL-MCNC: 8.4 MG/DL (ref 8.6–10.3)
CALPROTECTIN STL-MCNT: 62 UG/G
CARDIAC TROPONIN I PNL SERPL HS: 21 NG/L (ref 0–20)
CARDIAC TROPONIN I PNL SERPL HS: 21 NG/L (ref 0–20)
CHLORIDE SERPL-SCNC: 100 MMOL/L (ref 98–107)
CHLORIDE SERPL-SCNC: 93 MMOL/L (ref 98–107)
CHLORIDE SERPL-SCNC: 96 MMOL/L (ref 98–107)
CO2 SERPL-SCNC: 15 MMOL/L (ref 21–32)
CO2 SERPL-SCNC: 21 MMOL/L (ref 21–32)
CO2 SERPL-SCNC: 23 MMOL/L (ref 21–32)
CREAT SERPL-MCNC: 1.25 MG/DL (ref 0.5–1.3)
CREAT SERPL-MCNC: 1.59 MG/DL (ref 0.5–1.3)
CREAT SERPL-MCNC: 1.7 MG/DL (ref 0.5–1.3)
EGFRCR SERPLBLD CKD-EPI 2021: 44 ML/MIN/1.73M*2
EGFRCR SERPLBLD CKD-EPI 2021: 48 ML/MIN/1.73M*2
EGFRCR SERPLBLD CKD-EPI 2021: 64 ML/MIN/1.73M*2
EOSINOPHIL # BLD AUTO: 0.09 X10*3/UL (ref 0–0.7)
EOSINOPHIL NFR BLD AUTO: 1.6 %
ERYTHROCYTE [DISTWIDTH] IN BLOOD BY AUTOMATED COUNT: 12.8 % (ref 11.5–14.5)
ERYTHROCYTE [DISTWIDTH] IN BLOOD BY AUTOMATED COUNT: 13.1 % (ref 11.5–14.5)
GLUCOSE BLD MANUAL STRIP-MCNC: 157 MG/DL (ref 74–99)
GLUCOSE BLD MANUAL STRIP-MCNC: 168 MG/DL (ref 74–99)
GLUCOSE BLD MANUAL STRIP-MCNC: 169 MG/DL (ref 74–99)
GLUCOSE BLD MANUAL STRIP-MCNC: 225 MG/DL (ref 74–99)
GLUCOSE BLD MANUAL STRIP-MCNC: 322 MG/DL (ref 74–99)
GLUCOSE BLD MANUAL STRIP-MCNC: 375 MG/DL (ref 74–99)
GLUCOSE SERPL-MCNC: 176 MG/DL (ref 74–99)
GLUCOSE SERPL-MCNC: 217 MG/DL (ref 74–99)
GLUCOSE SERPL-MCNC: 399 MG/DL (ref 74–99)
HCT VFR BLD AUTO: 28.7 % (ref 41–52)
HCT VFR BLD AUTO: 30.4 % (ref 41–52)
HGB BLD-MCNC: 10.3 G/DL (ref 13.5–17.5)
HGB BLD-MCNC: 9.9 G/DL (ref 13.5–17.5)
IMM GRANULOCYTES # BLD AUTO: 0.02 X10*3/UL (ref 0–0.7)
IMM GRANULOCYTES NFR BLD AUTO: 0.3 % (ref 0–0.9)
LACTATE BLDV-SCNC: 1.9 MMOL/L (ref 0.4–2)
LYMPHOCYTES # BLD AUTO: 1.7 X10*3/UL (ref 1.2–4.8)
LYMPHOCYTES NFR BLD AUTO: 29.7 %
MCH RBC QN AUTO: 28.4 PG (ref 26–34)
MCH RBC QN AUTO: 28.8 PG (ref 26–34)
MCHC RBC AUTO-ENTMCNC: 33.9 G/DL (ref 32–36)
MCHC RBC AUTO-ENTMCNC: 34.5 G/DL (ref 32–36)
MCV RBC AUTO: 83 FL (ref 80–100)
MCV RBC AUTO: 85 FL (ref 80–100)
MONOCYTES # BLD AUTO: 0.57 X10*3/UL (ref 0.1–1)
MONOCYTES NFR BLD AUTO: 9.9 %
NEUTROPHILS # BLD AUTO: 3.3 X10*3/UL (ref 1.2–7.7)
NEUTROPHILS NFR BLD AUTO: 57.6 %
NRBC BLD-RTO: 0 /100 WBCS (ref 0–0)
NRBC BLD-RTO: 0 /100 WBCS (ref 0–0)
PLATELET # BLD AUTO: 207 X10*3/UL (ref 150–450)
PLATELET # BLD AUTO: 211 X10*3/UL (ref 150–450)
POTASSIUM SERPL-SCNC: 3.8 MMOL/L (ref 3.5–5.3)
POTASSIUM SERPL-SCNC: 3.9 MMOL/L (ref 3.5–5.3)
POTASSIUM SERPL-SCNC: 4.4 MMOL/L (ref 3.5–5.3)
PROT SERPL-MCNC: 5.2 G/DL (ref 6.4–8.2)
PROT SERPL-MCNC: 5.5 G/DL (ref 6.4–8.2)
RBC # BLD AUTO: 3.48 X10*6/UL (ref 4.5–5.9)
RBC # BLD AUTO: 3.58 X10*6/UL (ref 4.5–5.9)
SODIUM SERPL-SCNC: 129 MMOL/L (ref 136–145)
SODIUM SERPL-SCNC: 130 MMOL/L (ref 136–145)
SODIUM SERPL-SCNC: 132 MMOL/L (ref 136–145)
WBC # BLD AUTO: 5.7 X10*3/UL (ref 4.4–11.3)
WBC # BLD AUTO: 6.3 X10*3/UL (ref 4.4–11.3)

## 2025-01-29 PROCEDURE — 3430000001 HC RX 343 DIAGNOSTIC RADIOPHARMACEUTICALS: Performed by: INTERNAL MEDICINE

## 2025-01-29 PROCEDURE — 2500000001 HC RX 250 WO HCPCS SELF ADMINISTERED DRUGS (ALT 637 FOR MEDICARE OP): Performed by: INTERNAL MEDICINE

## 2025-01-29 PROCEDURE — 36415 COLL VENOUS BLD VENIPUNCTURE: CPT | Performed by: INTERNAL MEDICINE

## 2025-01-29 PROCEDURE — 97535 SELF CARE MNGMENT TRAINING: CPT | Mod: GO,CO

## 2025-01-29 PROCEDURE — 71045 X-RAY EXAM CHEST 1 VIEW: CPT | Performed by: RADIOLOGY

## 2025-01-29 PROCEDURE — 93018 CV STRESS TEST I&R ONLY: CPT | Performed by: INTERNAL MEDICINE

## 2025-01-29 PROCEDURE — 93016 CV STRESS TEST SUPVJ ONLY: CPT | Performed by: INTERNAL MEDICINE

## 2025-01-29 PROCEDURE — 2500000004 HC RX 250 GENERAL PHARMACY W/ HCPCS (ALT 636 FOR OP/ED)

## 2025-01-29 PROCEDURE — 70551 MRI BRAIN STEM W/O DYE: CPT | Performed by: RADIOLOGY

## 2025-01-29 PROCEDURE — 2500000002 HC RX 250 W HCPCS SELF ADMINISTERED DRUGS (ALT 637 FOR MEDICARE OP, ALT 636 FOR OP/ED): Performed by: SURGERY

## 2025-01-29 PROCEDURE — 84484 ASSAY OF TROPONIN QUANT: CPT | Performed by: INTERNAL MEDICINE

## 2025-01-29 PROCEDURE — 2060000001 HC INTERMEDIATE ICU ROOM DAILY

## 2025-01-29 PROCEDURE — 80048 BASIC METABOLIC PNL TOTAL CA: CPT | Mod: CCI | Performed by: INTERNAL MEDICINE

## 2025-01-29 PROCEDURE — 82947 ASSAY GLUCOSE BLOOD QUANT: CPT

## 2025-01-29 PROCEDURE — 2500000004 HC RX 250 GENERAL PHARMACY W/ HCPCS (ALT 636 FOR OP/ED): Performed by: INTERNAL MEDICINE

## 2025-01-29 PROCEDURE — G0427 INPT/ED TELECONSULT70: HCPCS | Performed by: STUDENT IN AN ORGANIZED HEALTH CARE EDUCATION/TRAINING PROGRAM

## 2025-01-29 PROCEDURE — 97110 THERAPEUTIC EXERCISES: CPT | Mod: GP

## 2025-01-29 PROCEDURE — 2500000002 HC RX 250 W HCPCS SELF ADMINISTERED DRUGS (ALT 637 FOR MEDICARE OP, ALT 636 FOR OP/ED): Performed by: INTERNAL MEDICINE

## 2025-01-29 PROCEDURE — A9502 TC99M TETROFOSMIN: HCPCS | Performed by: INTERNAL MEDICINE

## 2025-01-29 PROCEDURE — 70450 CT HEAD/BRAIN W/O DYE: CPT

## 2025-01-29 PROCEDURE — 95816 EEG AWAKE AND DROWSY: CPT

## 2025-01-29 PROCEDURE — 95816 EEG AWAKE AND DROWSY: CPT | Performed by: PSYCHIATRY & NEUROLOGY

## 2025-01-29 PROCEDURE — 71045 X-RAY EXAM CHEST 1 VIEW: CPT

## 2025-01-29 PROCEDURE — 70551 MRI BRAIN STEM W/O DYE: CPT

## 2025-01-29 PROCEDURE — 85027 COMPLETE CBC AUTOMATED: CPT | Performed by: INTERNAL MEDICINE

## 2025-01-29 PROCEDURE — 80061 LIPID PANEL: CPT | Performed by: STUDENT IN AN ORGANIZED HEALTH CARE EDUCATION/TRAINING PROGRAM

## 2025-01-29 PROCEDURE — 78452 HT MUSCLE IMAGE SPECT MULT: CPT | Performed by: RADIOLOGY

## 2025-01-29 PROCEDURE — 99233 SBSQ HOSP IP/OBS HIGH 50: CPT | Performed by: INTERNAL MEDICINE

## 2025-01-29 PROCEDURE — 99232 SBSQ HOSP IP/OBS MODERATE 35: CPT

## 2025-01-29 PROCEDURE — 83605 ASSAY OF LACTIC ACID: CPT | Performed by: INTERNAL MEDICINE

## 2025-01-29 PROCEDURE — 97530 THERAPEUTIC ACTIVITIES: CPT | Mod: GP

## 2025-01-29 PROCEDURE — 85025 COMPLETE CBC W/AUTO DIFF WBC: CPT | Performed by: INTERNAL MEDICINE

## 2025-01-29 PROCEDURE — 93010 ELECTROCARDIOGRAM REPORT: CPT | Performed by: INTERNAL MEDICINE

## 2025-01-29 PROCEDURE — 70450 CT HEAD/BRAIN W/O DYE: CPT | Performed by: RADIOLOGY

## 2025-01-29 PROCEDURE — 93005 ELECTROCARDIOGRAM TRACING: CPT

## 2025-01-29 PROCEDURE — 80053 COMPREHEN METABOLIC PANEL: CPT | Performed by: INTERNAL MEDICINE

## 2025-01-29 PROCEDURE — 93017 CV STRESS TEST TRACING ONLY: CPT

## 2025-01-29 PROCEDURE — 83880 ASSAY OF NATRIURETIC PEPTIDE: CPT | Performed by: INTERNAL MEDICINE

## 2025-01-29 RX ORDER — OXYCODONE AND ACETAMINOPHEN 5; 325 MG/1; MG/1
1 TABLET ORAL EVERY 8 HOURS PRN
Status: DISCONTINUED | OUTPATIENT
Start: 2025-01-29 | End: 2025-02-05 | Stop reason: HOSPADM

## 2025-01-29 RX ORDER — SODIUM CHLORIDE 9 MG/ML
100 INJECTION, SOLUTION INTRAVENOUS CONTINUOUS
Status: ACTIVE | OUTPATIENT
Start: 2025-01-29 | End: 2025-01-30

## 2025-01-29 RX ADMIN — HEPARIN SODIUM 5000 UNITS: 5000 INJECTION, SOLUTION INTRAVENOUS; SUBCUTANEOUS at 06:09

## 2025-01-29 RX ADMIN — OXYCODONE HYDROCHLORIDE AND ACETAMINOPHEN 1 TABLET: 5; 325 TABLET ORAL at 21:11

## 2025-01-29 RX ADMIN — INSULIN LISPRO 3 UNITS: 100 INJECTION, SOLUTION INTRAVENOUS; SUBCUTANEOUS at 21:15

## 2025-01-29 RX ADMIN — METOCLOPRAMIDE HYDROCHLORIDE 10 MG: 5 INJECTION INTRAMUSCULAR; INTRAVENOUS at 12:21

## 2025-01-29 RX ADMIN — METOPROLOL TARTRATE 50 MG: 50 TABLET, FILM COATED ORAL at 21:12

## 2025-01-29 RX ADMIN — HEPARIN SODIUM 5000 UNITS: 5000 INJECTION, SOLUTION INTRAVENOUS; SUBCUTANEOUS at 15:01

## 2025-01-29 RX ADMIN — SODIUM CHLORIDE, SODIUM LACTATE, POTASSIUM CHLORIDE, AND CALCIUM CHLORIDE 1000 ML: 600; 310; 30; 20 INJECTION, SOLUTION INTRAVENOUS at 16:22

## 2025-01-29 RX ADMIN — METOCLOPRAMIDE HYDROCHLORIDE 10 MG: 5 INJECTION INTRAMUSCULAR; INTRAVENOUS at 06:09

## 2025-01-29 RX ADMIN — METOPROLOL TARTRATE 50 MG: 50 TABLET, FILM COATED ORAL at 10:06

## 2025-01-29 RX ADMIN — PANTOPRAZOLE SODIUM 40 MG: 40 INJECTION, POWDER, FOR SOLUTION INTRAVENOUS at 21:12

## 2025-01-29 RX ADMIN — INSULIN LISPRO 6 UNITS: 100 INJECTION, SOLUTION INTRAVENOUS; SUBCUTANEOUS at 15:01

## 2025-01-29 RX ADMIN — TETROFOSMIN 30.1 MILLICURIE: 0.23 INJECTION, POWDER, LYOPHILIZED, FOR SOLUTION INTRAVENOUS at 07:40

## 2025-01-29 RX ADMIN — ASPIRIN 81 MG CHEWABLE TABLET 81 MG: 81 TABLET CHEWABLE at 10:06

## 2025-01-29 RX ADMIN — ATORVASTATIN CALCIUM 40 MG: 40 TABLET, FILM COATED ORAL at 21:12

## 2025-01-29 RX ADMIN — INSULIN LISPRO 15 UNITS: 100 INJECTION, SOLUTION INTRAVENOUS; SUBCUTANEOUS at 10:17

## 2025-01-29 RX ADMIN — METOCLOPRAMIDE HYDROCHLORIDE 10 MG: 5 INJECTION INTRAMUSCULAR; INTRAVENOUS at 17:45

## 2025-01-29 RX ADMIN — HEPARIN SODIUM 5000 UNITS: 5000 INJECTION, SOLUTION INTRAVENOUS; SUBCUTANEOUS at 21:12

## 2025-01-29 RX ADMIN — INSULIN LISPRO 3 UNITS: 100 INJECTION, SOLUTION INTRAVENOUS; SUBCUTANEOUS at 17:45

## 2025-01-29 RX ADMIN — SODIUM CHLORIDE 100 ML/HR: 900 INJECTION, SOLUTION INTRAVENOUS at 17:23

## 2025-01-29 RX ADMIN — TAMSULOSIN HYDROCHLORIDE 0.4 MG: 0.4 CAPSULE ORAL at 06:08

## 2025-01-29 RX ADMIN — PANTOPRAZOLE SODIUM 40 MG: 40 INJECTION, POWDER, FOR SOLUTION INTRAVENOUS at 10:07

## 2025-01-29 ASSESSMENT — COGNITIVE AND FUNCTIONAL STATUS - GENERAL
CLIMB 3 TO 5 STEPS WITH RAILING: TOTAL
HELP NEEDED FOR BATHING: A LITTLE
DRESSING REGULAR UPPER BODY CLOTHING: A LITTLE
MOBILITY SCORE: 16
DAILY ACTIVITIY SCORE: 19
MOVING TO AND FROM BED TO CHAIR: A LITTLE
TOILETING: A LOT
DRESSING REGULAR LOWER BODY CLOTHING: A LITTLE
WALKING IN HOSPITAL ROOM: A LOT
STANDING UP FROM CHAIR USING ARMS: A LITTLE
TURNING FROM BACK TO SIDE WHILE IN FLAT BAD: A LITTLE

## 2025-01-29 ASSESSMENT — PAIN SCALES - GENERAL
PAINLEVEL_OUTOF10: 0 - NO PAIN
PAINLEVEL_OUTOF10: 8
PAINLEVEL_OUTOF10: 7
PAINLEVEL_OUTOF10: 1
PAINLEVEL_OUTOF10: 0 - NO PAIN

## 2025-01-29 ASSESSMENT — ACTIVITIES OF DAILY LIVING (ADL)
BATHING_LEVEL_OF_ASSISTANCE: MODERATE ASSISTANCE
BATHING_WHERE_ASSESSED: OTHER (COMMENT)
HOME_MANAGEMENT_TIME_ENTRY: 40

## 2025-01-29 ASSESSMENT — PAIN - FUNCTIONAL ASSESSMENT
PAIN_FUNCTIONAL_ASSESSMENT: 0-10

## 2025-01-29 ASSESSMENT — PAIN DESCRIPTION - LOCATION: LOCATION: HIP

## 2025-01-29 NOTE — PROGRESS NOTES
Physical Therapy    Physical Therapy Treatment    Patient Name: Devin Gomez  MRN: [id number]  Department: [facility] 4 ICU  Room: [address] [place], [place] [address]  Today's Date: 1/29/2025  Time Calculation  Start Time: 0900  Stop Time: 0930  Time Calculation (min): 30 min         Assessment/Plan   PT Assessment  Barriers to Discharge Home: Physical needs, Caregiver assistance  Caregiver Assistance: Patient lives alone and/or does not have reliable caregiver assistance  Physical Needs: Intermittent mobility assistance needed, High falls risk due to function or environment, Other (Comment) (stairs to laundry--unable to manage at this time)  Evaluation/Treatment Tolerance: Patient limited by fatigue  Medical Staff Made Aware: Yes  Strengths: Premorbid level of function  Barriers to Participation: Comorbidities  End of Session Communication: Bedside nurse  Assessment Comment: pt required min to mod assist of 1 for the above mobility; Pt experienced seizure-like symptoms at end of session as mentioned above. Pt is NOT safe to return home alone and manage his ADL's/mobility without high risk of falls. Good w/c mobility however transfers and limited amb are unsafe( pt not able to take w/c into bathroom). Continue to recommend mod int rehab.  End of Session Patient Position: Bed, 3 rail up, Alarm on (multiple staff in room due to I-team called)  PT Plan  Inpatient/Swing Bed or Outpatient: Inpatient  PT Plan  Treatment/Interventions: Bed mobility, Transfer training, Gait training, Balance training, Strengthening, Endurance training, Therapeutic exercise, Therapeutic activity  PT Plan: Ongoing PT  PT Frequency: 3 times per week  PT Discharge Recommendations: Moderate intensity level of continued care  Equipment Recommended upon Discharge: Wheeled walker, Wheelchair  PT Recommended Transfer Status: Assist x1, Assistive device  PT - OK to Discharge: Yes      General Visit Information:   PT  Visit  PT Received On: 01/29/25  General  Family/Caregiver  Present: No  Prior to Session Communication: Bedside nurse  Patient Position Received: Bed, 3 rail up, Alarm off, not on at start of session  Preferred Learning Style: verbal, visual  General Comment: cleared by nurse for therapy; pt agreeable to therapy    Subjective   Precautions:  Precautions  Medical Precautions: Fall precautions     Date/Time Vitals Session Patient Position Pulse Resp SpO2 BP MAP (mmHg)    01/29/25 0900 During PT  --  --  --  --  --  --           Vital Signs Comment: O2 sat 98 to 99% with HR 75 to 79 bpm during session---room air     Objective   Pain:  Pain Assessment  Pain Assessment: 0-10  0-10 (Numeric) Pain Score: 0 - No pain  Cognition:  Cognition  Overall Cognitive Status: Within Functional Limits  Orientation Level: Oriented X4  Attention: Within Functional Limits  Safety/Judgement:  (decreased safety insight during functional mobility)  Insight: Mild  Coordination:  Movements are Fluid and Coordinated: No  Lower Body Coordination: decreased accuracy and timing of active movements left > right LE  Alternating Toe Taps: Impaired  Postural Control:  Postural Control  Posture Comment: mild to mod forward head, protracted shldrs  Extremity/Trunk Assessments:    Activity Tolerance:  Activity Tolerance  Endurance: Decreased tolerance for upright activites  Activity Tolerance Comments: fair - due to weakness  Treatments:  Therapeutic Exercise  Therapeutic Exercise Performed: Yes  Therapeutic Exercise Activity 1: supine kwame AP ( severely limited due to neuropathy/weakness, stiffness) x 20 reps  Therapeutic Exercise Activity 2: supine kwame heel slides x 15 reps right, 8 reps left with intermittant hands on assist from pt himself  Therapeutic Exercise Activity 3: seated right LAQ's x 17 reps, left x 10 reps  Therapeutic Exercise Activity 4: seated kwame marching x 15 reps  Therapeutic Exercise Activity 5: seated w/c push-ups x 6 reps    Therapeutic Activity  Therapeutic Activity Performed: Yes (see  bed mobility, transfers, amb with SW.)    Bed Mobility  Bed Mobility: Yes  Bed Mobility 1  Bed Mobility 1: Supine to sitting  Level of Assistance 1: Close supervision  Bed Mobility Comments 1: head of bed elevated    Ambulation/Gait Training  Ambulation/Gait Training Performed: Yes  Ambulation/Gait Training 1  Surface 1: Level tile  Device 1: Standard walker  Assistance 1: Minimum assistance, Moderate assistance  Quality of Gait 1: Wide base of support, Diminished heel strike, Decreased step length, Shuffling gait, Forward flexed posture, Soft knee(s)  Comments/Distance (ft) 1: pt amb approx 3-4 ft, + turns, verbal cues for erect posture, safe advancement of SW, increased active us eof kwame UE's pushing down through SW; overall balance fair - at best  Transfers  Transfer: Yes  Transfer 1  Transfer From 1: Bed to  Transfer to 1: Stand  Technique 1: Sit to stand  Transfer Level of Assistance 1: Minimum assistance, Minimal verbal cues  Trials/Comments 1: min assist of 1 for trunk up, verbal cues for proper kwame hand placement, forward weight shifting  Transfers 2  Transfer From 2: Stand to  Transfer to 2: Wheelchair  Technique 2: Stand to sit  Transfer Level of Assistance 2: Moderate assistance, Minimal verbal cues  Trials/Comments 2: mod assist of 1 for trunk down, verbal cues for reaching back with both hands for arms of w/c  Transfers 3  Transfer From 3: Bed to  Transfer to 3: Wheelchair  Technique 3: Stand pivot  Transfer Level of Assistance 3: Minimum assistance, Minimal verbal cues  Trials/Comments 3: min assist of 1 at trunk for stand pivot, verbal cues for proper kwame hand placement for safety and to assure that w/c brakes are locked.    Stairs  Stairs: No    Wheelchair Activities  Propulsion: Yes  Propulsion Type 1: Manual  Level 1: Level tile  Method 1: Right lower extremity, Left lower extremity  Level of Assistance 1: Distant supervision  Description/Details 1: 15 ft x 2, + 2 turns      Other Activity  Other  Activity Performed: Yes  Other Activity 1: As PT was ending session with pt in bedside armchair, pt appeared to experience seizure-like symptoms, including whole body tremors, eyes rolling back into his head, increased generalized tone, turning slightly pale, unresponsive; PT and rehab aide 2 person lifted pt from armchair back into bed as nurse and PCA entered room ( nurses button pushed and call out for help); Then I-Team was called. Spoke with Dr Chan regarding observation of pt's symptoms/experience. BS found to be 325.    Outcome Measures:  [facility] Basic Mobility  Turning from your back to your side while in a flat bed without using bedrails: None  Moving from lying on your back to sitting on the side of a flat bed without using bedrails: A little  Moving to and from bed to chair (including a wheelchair): A little  Standing up from a chair using your arms (e.g. wheelchair or bedside chair): A little  To walk in hospital room: A lot  Climbing 3-5 steps with railing: Total  Basic Mobility - Total Score: 16    Education Documentation    Precautions, taught by Naz Rebolledo PT at 1/29/2025  9:54 AM.  Learner: Patient  Readiness: Acceptance  Method: Explanation, Demonstration  Response: Needs Reinforcement  Comment: PT educated pt in safe transfer technique    Body Mechanics, taught by Naz Rebolledo PT at 1/29/2025  9:54 AM.  Learner: Patient  Readiness: Acceptance  Method: Explanation, Demonstration  Response: Needs Reinforcement  Comment: PT educated pt in safe transfer technique    Mobility Training, taught by Naz Rebolledo PT at 1/29/2025  9:54 AM.  Learner: Patient  Readiness: Acceptance  Method: Explanation, Demonstration  Response: Needs Reinforcement  Comment: PT educated pt in safe transfer technique    Education Comments  No comments found.               Encounter Problems       Encounter Problems (Active)       Balance       dynamic (Progressing)       Start:  01/22/25    Expected End:  02/05/25        Pt will perform Tinetti assessment and score >/= 24/28, resulting in a low falls risk             Mobility       LTG - Patient will be able to go up and down a curb/step with the appropriate device (Progressing)       Start:  01/22/25    Expected End:  02/05/25            LTG - Patient will navigate 4-6 steps with rails/device (Progressing)       Start:  01/22/25    Expected End:  02/05/25            ambulation (Progressing)       Start:  01/22/25    Expected End:  02/05/25       Pt will amb > 100 ft with wheeled walker/LRAD and mod independence          endurance (Progressing)       Start:  01/22/25    Expected End:  02/05/25       Pt will tolerate > 20 minutes of activity with stable vital signs and decreased c/o SOB/nausea.            PT Transfers       sit to stand (Progressing)       Start:  01/22/25    Expected End:  02/05/25       Pt will perform sit to stand transfer with LRAD and mod independence.             Pain - Adult          Safety       LTG - Patient will utilize safety techniques (Progressing)       Start:  01/22/25    Expected End:  02/05/25

## 2025-01-29 NOTE — CARE PLAN
The patient's goals for the shift include  increase mobility    The clinical goals for the shift include maintain stable hemodynamics        Problem: Diabetes  Goal: Achieve decreasing blood glucose levels by end of shift  Outcome: Progressing  Goal: Increase stability of blood glucose readings by end of shift  Outcome: Progressing  Goal: Decrease in ketones present in urine by end of shift  Outcome: Progressing  Goal: Maintain electrolyte levels within acceptable range throughout shift  Outcome: Progressing  Goal: Maintain glucose levels >70mg/dl to <250mg/dl throughout shift  Outcome: Progressing  Goal: No changes in neurological exam by end of shift  Outcome: Progressing  Goal: Learn about and adhere to nutrition recommendations by end of shift  Outcome: Progressing  Goal: Vital signs within normal range for age by end of shift  Outcome: Progressing  Goal: Increase self care and/or family involovement by end of shift  Outcome: Progressing  Goal: Receive DSME education by end of shift  Outcome: Progressing     Problem: Pain - Adult  Goal: Verbalizes/displays adequate comfort level or baseline comfort level  Outcome: Progressing     Problem: Safety - Adult  Goal: Free from fall injury  Outcome: Progressing     Problem: Discharge Planning  Goal: Discharge to home or other facility with appropriate resources  Outcome: Progressing     Problem: Chronic Conditions and Co-morbidities  Goal: Patient's chronic conditions and co-morbidity symptoms are monitored and maintained or improved  Outcome: Progressing     Problem: Fall/Injury  Goal: Not fall by end of shift  Outcome: Progressing  Goal: Be free from injury by end of the shift  Outcome: Progressing  Goal: Verbalize understanding of personal risk factors for fall in the hospital  Outcome: Progressing  Goal: Verbalize understanding of risk factor reduction measures to prevent injury from fall in the home  Outcome: Progressing  Goal: Use assistive devices by end of the  shift  Outcome: Progressing  Goal: Pace activities to prevent fatigue by end of the shift  Outcome: Progressing     Problem: Pain  Goal: Takes deep breaths with improved pain control throughout the shift  Outcome: Progressing  Goal: Turns in bed with improved pain control throughout the shift  Outcome: Progressing  Goal: Walks with improved pain control throughout the shift  Outcome: Progressing  Goal: Performs ADL's with improved pain control throughout shift  Outcome: Progressing  Goal: Participates in PT with improved pain control throughout the shift  Outcome: Progressing  Goal: Free from opioid side effects throughout the shift  Outcome: Progressing  Goal: Free from acute confusion related to pain meds throughout the shift  Outcome: Progressing      Yes

## 2025-01-29 NOTE — PROGRESS NOTES
"Devin Gomez is a 64 y.o. male on day 8 of admission presenting with Dehydration.    Subjective   A rapid response was called this morning after a questionable syncopal episode.  Apparently patient was working with physical therapy he states he became lightheaded does not remember much else.  States he feels back to his baseline.  Denies lightheadedness or dizziness.       Objective     Physical Exam  Constitutional:       Appearance: He is ill-appearing.   Cardiovascular:      Rate and Rhythm: Normal rate and regular rhythm.      Pulses: Normal pulses.      Heart sounds: Normal heart sounds.   Pulmonary:      Effort: Pulmonary effort is normal.      Breath sounds: Normal breath sounds.   Abdominal:      General: Abdomen is flat.   Musculoskeletal:      Right lower leg: No edema.      Left lower leg: No edema.   Skin:     General: Skin is warm and dry.   Neurological:      Mental Status: He is alert and oriented to person, place, and time.         Last Recorded Vitals  Blood pressure 105/52, pulse 65, temperature 36.1 °C (97 °F), temperature source Temporal, resp. rate 16, height 1.753 m (5' 9\"), weight 60.1 kg (132 lb 7.9 oz), SpO2 100%.  Intake/Output last 3 Shifts:  I/O last 3 completed shifts:  In: 1150 (19.1 mL/kg) [P.O.:850; I.V.:300 (5 mL/kg)]  Out: 1810 (30.1 mL/kg) [Urine:1810 (0.8 mL/kg/hr)]  Weight: 60.1 kg     Relevant Results  Results for orders placed or performed during the hospital encounter of 01/21/25 (from the past 24 hours)   POCT GLUCOSE   Result Value Ref Range    POCT Glucose 154 (H) 74 - 99 mg/dL   POCT GLUCOSE   Result Value Ref Range    POCT Glucose 295 (H) 74 - 99 mg/dL   Comprehensive Metabolic Panel   Result Value Ref Range    Glucose 254 (H) 74 - 99 mg/dL    Sodium 128 (L) 136 - 145 mmol/L    Potassium 3.6 3.5 - 5.3 mmol/L    Chloride 96 (L) 98 - 107 mmol/L    Bicarbonate 21 21 - 32 mmol/L    Anion Gap 15 10 - 20 mmol/L    Urea Nitrogen 25 (H) 6 - 23 mg/dL    Creatinine 1.29 0.50 - " 1.30 mg/dL    eGFR 61 >60 mL/min/1.73m*2    Calcium 8.3 (L) 8.6 - 10.3 mg/dL    Albumin 3.5 3.4 - 5.0 g/dL    Alkaline Phosphatase 56 33 - 136 U/L    Total Protein 5.7 (L) 6.4 - 8.2 g/dL    AST 23 9 - 39 U/L    Bilirubin, Total 0.8 0.0 - 1.2 mg/dL    ALT 32 10 - 52 U/L   Beta Hydroxybutyrate   Result Value Ref Range    Beta-Hydroxybutyrate 3.54 (H) 0.02 - 0.27 mmol/L   POCT GLUCOSE   Result Value Ref Range    POCT Glucose 236 (H) 74 - 99 mg/dL   POCT GLUCOSE   Result Value Ref Range    POCT Glucose 157 (H) 74 - 99 mg/dL   Comprehensive Metabolic Panel   Result Value Ref Range    Glucose 176 (H) 74 - 99 mg/dL    Sodium 132 (L) 136 - 145 mmol/L    Potassium 3.9 3.5 - 5.3 mmol/L    Chloride 100 98 - 107 mmol/L    Bicarbonate 23 21 - 32 mmol/L    Anion Gap 13 10 - 20 mmol/L    Urea Nitrogen 23 6 - 23 mg/dL    Creatinine 1.25 0.50 - 1.30 mg/dL    eGFR 64 >60 mL/min/1.73m*2    Calcium 8.2 (L) 8.6 - 10.3 mg/dL    Albumin 3.3 (L) 3.4 - 5.0 g/dL    Alkaline Phosphatase 54 33 - 136 U/L    Total Protein 5.2 (L) 6.4 - 8.2 g/dL    AST 20 9 - 39 U/L    Bilirubin, Total 1.0 0.0 - 1.2 mg/dL    ALT 29 10 - 52 U/L   CBC and Auto Differential   Result Value Ref Range    WBC 5.7 4.4 - 11.3 x10*3/uL    nRBC 0.0 0.0 - 0.0 /100 WBCs    RBC 3.48 (L) 4.50 - 5.90 x10*6/uL    Hemoglobin 9.9 (L) 13.5 - 17.5 g/dL    Hematocrit 28.7 (L) 41.0 - 52.0 %    MCV 83 80 - 100 fL    MCH 28.4 26.0 - 34.0 pg    MCHC 34.5 32.0 - 36.0 g/dL    RDW 12.8 11.5 - 14.5 %    Platelets 207 150 - 450 x10*3/uL    Neutrophils % 57.6 40.0 - 80.0 %    Immature Granulocytes %, Automated 0.3 0.0 - 0.9 %    Lymphocytes % 29.7 13.0 - 44.0 %    Monocytes % 9.9 2.0 - 10.0 %    Eosinophils % 1.6 0.0 - 6.0 %    Basophils % 0.9 0.0 - 2.0 %    Neutrophils Absolute 3.30 1.20 - 7.70 x10*3/uL    Immature Granulocytes Absolute, Automated 0.02 0.00 - 0.70 x10*3/uL    Lymphocytes Absolute 1.70 1.20 - 4.80 x10*3/uL    Monocytes Absolute 0.57 0.10 - 1.00 x10*3/uL    Eosinophils Absolute  0.09 0.00 - 0.70 x10*3/uL    Basophils Absolute 0.05 0.00 - 0.10 x10*3/uL   POCT GLUCOSE   Result Value Ref Range    POCT Glucose 322 (H) 74 - 99 mg/dL   Stress test with myocardial perfusion   Result Value Ref Range    Predicted METS 7.4 METS   POCT GLUCOSE   Result Value Ref Range    POCT Glucose 375 (H) 74 - 99 mg/dL   ECG 12 Lead   Result Value Ref Range    Ventricular Rate 76 BPM    Atrial Rate 76 BPM    SC Interval 128 ms    QRS Duration 104 ms    QT Interval 438 ms    QTC Calculation(Bazett) 492 ms    P Axis 77 degrees    R Axis 79 degrees    T Axis 9 degrees    QRS Count 13 beats    Q Onset 219 ms    P Onset 155 ms    P Offset 205 ms    T Offset 438 ms    QTC Fredericia 473 ms   Comprehensive Metabolic Panel   Result Value Ref Range    Glucose 399 (H) 74 - 99 mg/dL    Sodium 129 (L) 136 - 145 mmol/L    Potassium 4.4 3.5 - 5.3 mmol/L    Chloride 93 (L) 98 - 107 mmol/L    Bicarbonate 15 (L) 21 - 32 mmol/L    Anion Gap 25 (H) 10 - 20 mmol/L    Urea Nitrogen 29 (H) 6 - 23 mg/dL    Creatinine 1.59 (H) 0.50 - 1.30 mg/dL    eGFR 48 (L) >60 mL/min/1.73m*2    Calcium 8.4 (L) 8.6 - 10.3 mg/dL    Albumin 3.4 3.4 - 5.0 g/dL    Alkaline Phosphatase 56 33 - 136 U/L    Total Protein 5.5 (L) 6.4 - 8.2 g/dL    AST 21 9 - 39 U/L    Bilirubin, Total 1.2 0.0 - 1.2 mg/dL    ALT 29 10 - 52 U/L   CBC   Result Value Ref Range    WBC 6.3 4.4 - 11.3 x10*3/uL    nRBC 0.0 0.0 - 0.0 /100 WBCs    RBC 3.58 (L) 4.50 - 5.90 x10*6/uL    Hemoglobin 10.3 (L) 13.5 - 17.5 g/dL    Hematocrit 30.4 (L) 41.0 - 52.0 %    MCV 85 80 - 100 fL    MCH 28.8 26.0 - 34.0 pg    MCHC 33.9 32.0 - 36.0 g/dL    RDW 13.1 11.5 - 14.5 %    Platelets 211 150 - 450 x10*3/uL   B-Type Natriuretic Peptide   Result Value Ref Range     (H) 0 - 99 pg/mL   Troponin I, High Sensitivity, Initial   Result Value Ref Range    Troponin I, High Sensitivity 21 (H) 0 - 20 ng/L   Troponin, High Sensitivity, 1 Hour   Result Value Ref Range    Troponin I, High Sensitivity 21 (H)  0 - 20 ng/L                 Malnutrition Diagnosis Status: New  Malnutrition Diagnosis: Severe malnutrition related to acute disease or injury     As Evidenced by: 10# (6.8%) wt loss over 1 week, moderate/severe subcutaneous fat loss and muscle wasting, po intake </= 50% estimated needs for >/= 5 days  I agree with the dietitian's malnutrition diagnosis.      Assessment/Plan   Assessment & Plan  Dehydration    NSTEMI (non-ST elevated myocardial infarction) (Multi)    Diabetic ketoacidosis without coma associated with type 2 diabetes mellitus (Multi)    Pancolitis (Multi)    Vomiting    Bladder retention    Ambulatory dysfunction    Syncope and collapse    1/27: We have seen a rise and fall in his high-sensitivity troponins despite any particular intervention for acute coronary syndrome. That being said with known peripheral arterial disease it would be worth risk stratifying him with a myocardial SPECT stress test. We can accomplish this tomorrow morning. Will continue to follow.     1/28: Plan was initially for a Lexiscan SPECT stress test today however he is headed to [place] for endoscopy.  Will try to coordinate this with cardiopulmonary perhaps he can get his stress test while he is there versus when he comes back.    1/29: Patient did undergo Lexiscan stress yesterday which reveals moderate size fixed defect in the inferior basal mid myocardium no evidence of reversible ischemia.  Will keep him on 81 mg daily aspirin and high potency statin with 40 mg Lipitor.  Will also continue his 50 mg twice daily metoprolol.  No other workup necessary at this point from a cardiac standpoint.  In regards to his syncopal episode this morning would recommend checking orthostatic vital signs as patient is on the hypertensive side this morning most recent blood pressure systolically 105/52.  Clinically appears euvolemic.  Saturating well on room air pulse oximetry at 100%.  Remains in normal sinus rhythm on telemetry.       I  spent 35 minutes in the professional and overall care of this patient.      Rolanda Mitchell PA-C

## 2025-01-29 NOTE — PROGRESS NOTES
Occupational Therapy    OT Treatment    Patient Name: Devin Gomez  MRN: [id number]  Department: [facility] 4 ICU  Room: [address] [place], [place] [address]  Today's Date: 1/29/2025  Time Calculation  Start Time: 1422  Stop Time: 1502  Time Calculation (min): 40 min        Assessment:  OT Assessment: pt making steady progress toward POC goals, session limited this date to chair and bed due to pt having seizure earlier this date.  Prognosis: Good  Barriers to Discharge Home: Caregiver assistance, Physical needs  Caregiver Assistance: Patient lives alone and/or does not have reliable caregiver assistance  Physical Needs: Intermittent mobility assistance needed, Intermittent ADL assistance needed  Evaluation/Treatment Tolerance: Patient tolerated treatment well  End of Session Communication: Bedside nurse  End of Session Patient Position: Bed, 3 rail up, Alarm on (All needs in reach)  OT Assessment Results: Decreased ADL status, Decreased upper extremity strength, Decreased safe judgment during ADL, Decreased cognition, Decreased endurance, Decreased functional mobility, Decreased IADLs  Prognosis: Good  Evaluation/Treatment Tolerance: Patient tolerated treatment well  Plan:  Treatment Interventions: ADL retraining, Functional transfer training, UE strengthening/ROM, Endurance training, Compensatory technique education, Patient/family training  OT Frequency: 3 times per week  OT Discharge Recommendations: Low intensity level of continued care  Equipment Recommended upon Discharge: Wheeled walker, Wheelchair  OT Recommended Transfer Status: Stand by assist, Assistive equipment (Comment)  OT - OK to Discharge: Yes  Treatment Interventions: ADL retraining, Functional transfer training, UE strengthening/ROM, Endurance training, Compensatory technique education, Patient/family training    Subjective   Previous Visit Info:  OT Last Visit  OT Received On: 01/29/25  General:  General  Reason for Referral: impaired mobility, dehydration  Referred By:   Benson  Past Medical History Relevant to Rehab: DM with neuropathy, PVD, HTN, hyperlipidemia  Prior to Session Communication: Bedside nurse  Patient Position Received: Bed, 3 rail up, Alarm off, not on at start of session  General Comment: Agreeable to treatment.  Precautions:  Hearing/Visual Limitations: + glasses  Medical Precautions: Fall precautions, Seizure precautions    Pain:  Pain Assessment  Pain Assessment: 0-10  0-10 (Numeric) Pain Score: 8  Pain Type: Chronic pain  Pain Location: Hip  Pain Orientation: Left  Pain Radiating Towards: `  Pain Interventions: Repositioned  Response to Interventions: Content/relaxed    Objective    Cognition:  Cognition  Overall Cognitive Status: Within Functional Limits     Activities of Daily Living: Grooming  Grooming Level of Assistance: Setup  Grooming Where Assessed: Chair  Grooming Comments: soral care, washing face/head with washrags    UE Bathing  UE Bathing Level of Assistance: Minimum assistance  UE Bathing Where Assessed: Other (Comment) (in chair, sponge bathing, assist with Rt axillary)    LE Bathing  LE Bathing Level of Assistance: Moderate assistance  LE Bathing Where Assessed: Other (Comment) (chair)  LE Bathing Comments: pt sponge bathed BLE, figure 4 technique, assist for son area in stance    UE Dressing  UE Dressing Level of Assistance: Minimum assistance  UE Dressing Where Assessed: Chair  UE Dressing Comments: [facility]     Bed Mobility/Transfers: Bed Mobility 1  Bed Mobility 1: Supine to sitting  Level of Assistance 1: Close supervision  Bed Mobility Comments 1: HOB slighty elevated  Bed Mobility 2  Bed Mobility  2: Sitting to supine  Level of Assistance 2: Close supervision  Bed Mobility Comments 2: bed flat    Transfer 1  Transfer From 1: Bed to  Transfer to 1: Chair with arms  Technique 1: To right  Transfer Device 1: Walker  Transfer Level of Assistance 1: Minimum assistance  Trials/Comments 1: pt takes ~ 8 steps from bed>chair,  unsteady  Transfers 2  Transfer From 2: Chair with arms to  Transfer to 2: Bed  Technique 2: To left  Transfer Device 2: Walker  Transfer Level of Assistance 2: Minimum assistance  Trials/Comments 2: cues for safe hand placement  Therapy/Activity: Therapeutic Exercise  Therapeutic Exercise Activity 1: scap protraction/retraction  Therapeutic Exercise Activity 2: shoulder flexion  Therapeutic Exercise Activity 3: shoulder IR/ER  Therapeutic Exercise Activity 4: 1 set x 10-15 reps, 1 lb free wt  Therapeutic Exercise Activity 5: pt completes with fair- form, Max cues for correct joint alignment, limited with c/o RUE decrease in strength.    Outcome Measures:[facility] Daily Activity  Putting on and taking off regular lower body clothing: A little  Bathing (including washing, rinsing, drying): A little  Putting on and taking off regular upper body clothing: A little  Toileting, which includes using toilet, bedpan or urinal: A lot  Taking care of personal grooming such as brushing teeth: None  Eating Meals: None  Daily Activity - Total Score: 19    Education Documentation  ADL Training, taught by LEELA Taylor at 1/29/2025  3:25 PM.  Learner: Patient  Readiness: Acceptance  Method: Explanation  Response: Needs Reinforcement  Comment: safe transfer techniques    Education Comments  No comments found.      Goals:  Encounter Problems       Encounter Problems (Active)       OT Goals       ADLS (Progressing)       Start:  01/22/25    Expected End:  02/05/25       Patient will complete ADL tasks, with modified independence using AE need in order to increase patient's safety and independence with self-care tasks.           Functional Transfers (Progressing)       Start:  01/22/25    Expected End:  02/05/25       Patient will complete functional transfers with modified independence using least restrictive device, in order to increase patient's safety and independence with daily tasks.           Activity Tolerance (Progressing)        Start:  01/22/25    Expected End:  02/05/25       Patient will demonstrate the ability to participate in functional activity at least >/= 20 minutes in order to increase patient's safety and independence with daily tasks.

## 2025-01-29 NOTE — ASSESSMENT & PLAN NOTE
This seems the presenting event.  Unclear if this was vagal syncope versus seizure.  Check stat CT brain consider MRI brain.  Keep on telemetry will notify cardiology of the event will consult neurology.  Seizure precautions repeat labs check EKG check troponins monitor

## 2025-01-29 NOTE — CONSULTS
HPI: Teleneuro consult at [facility]  64 y.o. male with hx of DM presenting with hyperglycemia, had episode of LOC with shaking today for which neuro was consulted.    Hx from pt and chart review.  Pt states this am he recalls feeling overall unwell. He was having feelings of being hot and cold at the same time, feeling soaking wet and sweaty but also freezing cold. This is similar to his sx he was having when he presented few days ago and was found with glucose >500s. This is how he knew his sugars were off again. Subsequently he does not recall most events this am. He does recall going for CT scan and getting help but rest is all blurry, does not recall working with PT. At the time of interview pt feels back to himself. Denies any vision changes, hearing changes, speech or language changes, unusual weakness or numbness of any particular arm or leg, balance issues or falls. He is frustrated about his sugars being out of control this am. He has never had an LOC event before.     Per chart review pt worked with PT today when pt was by bedside assisting pt to get in to bedside armchair, pt had 'whole body tremors' with eyes rolling back into his head, increased tone, turning pale, unresponsive. PT and rehab aide lifted pt back to bed, I-team called. BS found to be 325. Also tele strip at the time showed sinus poa to 46.     He has bad neuropathy up to his knees and his hands, and has occasional orthostatic dizziness in the morning getting out of bed.  He has shooting pain as if someone is peeling his skin, has tried lyrica and other meds before which did not help.          Patient Active Problem List    Diagnosis Date Noted    Syncope and collapse 01/29/2025    Ambulatory dysfunction 01/24/2025    NSTEMI (non-ST elevated myocardial infarction) (Multi) 01/22/2025    Diabetic ketoacidosis without coma associated with type 2 diabetes mellitus (Multi) 01/22/2025    Pancolitis (Multi) 01/22/2025    Vomiting 01/22/2025     Bladder retention 01/22/2025    Dehydration 01/21/2025     Current Facility-Administered Medications   Medication Dose Route Frequency Provider Last Rate Last Admin    acetaminophen (Tylenol) tablet 650 mg  650 mg oral q4h PRN Ismael Knowles MD   650 mg at 01/25/25 2305    Or    acetaminophen (Tylenol) oral liquid 650 mg  650 mg oral q4h PRN Ismael Knowles MD        Or    acetaminophen (Tylenol) suppository 650 mg  650 mg rectal q4h PRN Ismael Knowles MD        aspirin chewable tablet 81 mg  81 mg oral Daily Ismael Knowles MD   81 mg at 01/29/25 1006    atorvastatin (Lipitor) tablet 40 mg  40 mg oral Nightly Yeison Lake MD   40 mg at 01/28/25 2156    dextrose 50 % injection 12.5 g  12.5 g intravenous q15 min PRN Ismael Knowles MD        dextrose 50 % injection 25 g  25 g intravenous q15 min PRN Ismael Knowles MD        dextrose 50 % injection 50 mL  50 mL intravenous q15 min PRN Ismael Knowles MD        glucagon (Glucagen) injection 1 mg  1 mg intramuscular q15 min PRN Ismael Knowles MD        glucagon (Glucagen) injection 1 mg  1 mg intramuscular q15 min PRN Ismael Knowles MD        heparin (porcine) injection 1,500-3,000 Units  1,500-3,000 Units intravenous PRN Ismael Knowles MD        heparin (porcine) injection 5,000 Units  5,000 Units subcutaneous q8h Ismael Knowles MD   5,000 Units at 01/29/25 1501    hydrALAZINE (Apresoline) injection 5 mg  5 mg intravenous q4h PRN Ismael Knowles MD   5 mg at 01/27/25 0336    insulin lispro injection 0-15 Units  0-15 Units subcutaneous q4h Jamaal Estrada MD   6 Units at 01/29/25 1501    lactated Ringer's bolus 1,000 mL  1,000 mL intravenous Once Ismael Knowles  mL/hr at 01/29/25 1622 1,000 mL at 01/29/25 1622    melatonin tablet 5 mg  5 mg oral Nightly PRN Ismael Knowles MD        metoclopramide (Reglan) injection 10 mg  10 mg intravenous q6h [facility]  BJ Frazier APRHIPOLITORojelioCNP   10 mg at 01/29/25 1221    metoprolol tartrate (Lopressor) tablet 50 mg  50 mg oral BID Yeison Lake MD   50 mg at 01/29/25 1006    ondansetron ODT (Zofran-ODT) disintegrating tablet 4 mg  4 mg oral q8h PRN Ismael Knowles MD   4 mg at 01/22/25 0839    Or    ondansetron (Zofran) injection 4 mg  4 mg intravenous q8h PRN Ismael Knowles MD   4 mg at 01/23/25 0411    oxyCODONE-acetaminophen (Percocet) 5-325 mg per tablet 1 tablet  1 tablet oral q8h PRN Ismael Knowles MD        pantoprazole (ProtoNix) injection 40 mg  40 mg intravenous BID Ismael Knowles MD   40 mg at 01/29/25 1007    polyethylene glycol (Glycolax, Miralax) packet 17 g  17 g oral TID Mary FrazierELHAM-CNP   17 g at 01/27/25 1551    tamsulosin (Flomax) 24 hr capsule 0.4 mg  0.4 mg oral Daily before breakfast Omkar Polk MD   0.4 mg at 01/29/25 0608     Allergies: Patient has no known allergies.  Past Medical History:   Diagnosis Date    Diabetes mellitus (Multi)      History reviewed. No pertinent surgical history.  No family history on file.  Social History     Tobacco Use    Smoking status: Never    Smokeless tobacco: Never   Substance Use Topics    Alcohol use: Not Currently       Exam:  Vitals:    01/29/25 1602   BP: (!) 79/45   Pulse:    Resp:    Temp:    SpO2:      Virtual, limited exam performed  Patient is laying in bed, in NAD  Language without dysarthria or aphasia  Gaze midline, EOM appears full range horizontally and vertically   Face is symmetric on eyeclosure, eyebrow raise and smile  Tongue midline   No drift in both arms and no drift in both legs  Sensation limited to light touch up to knee, bilateral hands       NIHSS 0    Imaging Results:  MRI: No MRI head results found for the past 14 days  CT Head: CT head wo IV contrast    Result Date: 1/29/2025  No significant interval change since previous exam. No evidence of acute cortical infarct or intracranial hemorrhage. If  symptoms persist, further evaluation with MRI should be considered for better assessment.   MACRO: None   Signed by: Domitila Dejesus 1/29/2025 11:18 AM Dictation workstation:   [id number]    CT head wo IV contrast    Result Date: 1/23/2025  1. Area of diminished attenuation along the left side of the past possibly representing an area of remote lacunar infarction. Correlation with MRI of the brain without contrast would be useful for more definitive assessment. 2. Supratentorial atrophy is noted.       MACRO: none   Signed by: Mahesh Mendez 1/23/2025 3:59 PM Dictation workstation:   [id number]   CTA: No CT Angio Head Results found for the past 14 days  Echo: No echocardiogram results found for the past 14 days    ASSESSMENT:  64 y.o. male with hx of DM presenting with hyperglycemia, had episode of LOC with shaking today in the setting of his usual hyperglycemic sx and bradycardia, concerning for convulsive syncope. However given poor documentation about duration of the event and this being first time having LOC event would evaluate with EEG.     Diagnosis:   Convulsive syncope suspected     RECOMMENDATIONS:   - routine EEG  - clinically monitor   If negative EEG and no further events, can hold off on further evaluation     Consult completed by: TELEPHONE and VIDEO interactive communication was used to provide this telehealth service. Time includes consultation with ED provider and extensive review of data- history, medical records, lab/radiology/medical test results, neuroimaging studies:  70 minutes was spent in INITIAL telehealth consultation

## 2025-01-29 NOTE — PROGRESS NOTES
01/29/25 1626   Discharge Planning   Home or Post Acute Services Post acute facilities (Rehab/SNF/etc)   Type of Post Acute Facility Services Skilled nursing  (Patient agreeable to SNF.  [facility]  Additional referrals to [facility], [facility], [facility] and [place].  Awaiting updates/acceptance.  Will need precert prior to d/c.)   Expected Discharge Disposition SNF   Does the patient need discharge transport arranged? Yes   RoundTrip coordination needed? Yes   Has discharge transport been arranged? No

## 2025-01-29 NOTE — PROGRESS NOTES
Devin Gomez is a 64 y.o. male on day 8 of admission presenting with Dehydration.      Subjective   This is also to cover rapid response note      Patient was working with physical therapy for good 20-30 minutes doing well.  There were just about done when he all of a sudden became poorly responsive stiffened up.  This was in the sitting position in a chair.  By the time the nurse arrived he was already back in bed.  The patient does not have a good recollection of what exactly happened.  Telemetry strips showed just sinus bradycardia in the range of 46 but no obvious pauses or any other arrhythmia.  Currently the patient feels back to normal except that he feels somewhat lightheaded.  There is no chest pain no focal weakness he moves all 4 extremities equally there is no headache no nausea no abdominal pain   Blood sugar was in the 300s    Objective     Last Recorded Vitals  BP (!) 114/46 (BP Location: Left arm, Patient Position: Lying) Comment: 2nd reading right arm: 101/46 (RN Notified)  Pulse 80   Temp 36.2 °C (97.2 °F) (Temporal)   Resp 14   Wt 60.1 kg (132 lb 7.9 oz)   SpO2 99%   Intake/Output last 3 Shifts:    Intake/Output Summary (Last 24 hours) at 1/29/2025 0941  Last data filed at 1/28/2025 2300  Gross per 24 hour   Intake 650 ml   Output 710 ml   Net -60 ml       Physical Exam  He is alert oriented x 3 cooperative he easily recognizes me  Normocephalic/atraumatic EOMI PERRLA  Oral mucosa no tongue bite  Neck supple no JVD chest clear  Heart regular telemetry strips reviewed  Abdomen soft nontender extremities no edema  Neurologic examination gross motor sensor nonfocal moves all 4 extremities equally  Psych no psychosis  Relevant Results  Labs from this morning reviewed  Assessment/Plan     Assessment & Plan  Dehydration  Pancolitis  Type 2 diabetes mellitus with hyperglycemia  High blood pressure without diagnosis of hypertension  Diffuse abdominal aorta plaque  Urinary bladder distention  DVT  prophylaxis    NSTEMI (non-ST elevated myocardial infarction) (Multi)    Diabetic ketoacidosis without coma associated with type 2 diabetes mellitus (Multi)    Pancolitis (Multi)    Vomiting    Bladder retention    Ambulatory dysfunction    Syncope and collapse  This seems the presenting event.  Unclear if this was vagal syncope versus seizure.  Check stat CT brain consider MRI brain.  Keep on telemetry will notify cardiology of the event will consult neurology.  Seizure precautions repeat labs check EKG check troponins monitor    January 29, see above    Stress test results to be concluded now.  EGD colonoscopy were done yesterday fairly unremarkable results.  DKA has improved resolved.             Ismael Knowles MD

## 2025-01-29 NOTE — ASSESSMENT & PLAN NOTE
Pancolitis  Type 2 diabetes mellitus with hyperglycemia  High blood pressure without diagnosis of hypertension  Diffuse abdominal aorta plaque  Urinary bladder distention  DVT prophylaxis

## 2025-01-29 NOTE — CARE PLAN
The clinical goals for the shift include stable hemodynamics      Problem: Diabetes  Goal: Achieve decreasing blood glucose levels by end of shift  Outcome: Progressing     Problem: Diabetes  Goal: Increase stability of blood glucose readings by end of shift  Outcome: Progressing     Problem: Diabetes  Goal: Maintain electrolyte levels within acceptable range throughout shift  Outcome: Progressing

## 2025-01-30 LAB
ALBUMIN SERPL BCP-MCNC: 3.2 G/DL (ref 3.4–5)
ALP SERPL-CCNC: 51 U/L (ref 33–136)
ALT SERPL W P-5'-P-CCNC: 25 U/L (ref 10–52)
ANION GAP SERPL CALCULATED.3IONS-SCNC: 12 MMOL/L (ref 10–20)
AST SERPL W P-5'-P-CCNC: 19 U/L (ref 9–39)
ATRIAL RATE: 67 BPM
ATRIAL RATE: 76 BPM
BASOPHILS # BLD AUTO: 0.05 X10*3/UL (ref 0–0.1)
BASOPHILS NFR BLD AUTO: 0.9 %
BILIRUB SERPL-MCNC: 0.7 MG/DL (ref 0–1.2)
BUN SERPL-MCNC: 27 MG/DL (ref 6–23)
CALCIUM SERPL-MCNC: 7.9 MG/DL (ref 8.6–10.3)
CHLORIDE SERPL-SCNC: 101 MMOL/L (ref 98–107)
CHOLEST SERPL-MCNC: 145 MG/DL (ref 0–199)
CHOLESTEROL/HDL RATIO: 2.2
CO2 SERPL-SCNC: 23 MMOL/L (ref 21–32)
CREAT SERPL-MCNC: 1.38 MG/DL (ref 0.5–1.3)
EGFRCR SERPLBLD CKD-EPI 2021: 56 ML/MIN/1.73M*2
EOSINOPHIL # BLD AUTO: 0.12 X10*3/UL (ref 0–0.7)
EOSINOPHIL NFR BLD AUTO: 2.2 %
ERYTHROCYTE [DISTWIDTH] IN BLOOD BY AUTOMATED COUNT: 13.5 % (ref 11.5–14.5)
GLUCOSE BLD MANUAL STRIP-MCNC: 101 MG/DL (ref 74–99)
GLUCOSE BLD MANUAL STRIP-MCNC: 141 MG/DL (ref 74–99)
GLUCOSE BLD MANUAL STRIP-MCNC: 155 MG/DL (ref 74–99)
GLUCOSE BLD MANUAL STRIP-MCNC: 165 MG/DL (ref 74–99)
GLUCOSE BLD MANUAL STRIP-MCNC: 273 MG/DL (ref 74–99)
GLUCOSE BLD MANUAL STRIP-MCNC: 363 MG/DL (ref 74–99)
GLUCOSE BLD MANUAL STRIP-MCNC: 407 MG/DL (ref 74–99)
GLUCOSE BLD MANUAL STRIP-MCNC: 450 MG/DL (ref 74–99)
GLUCOSE BLD MANUAL STRIP-MCNC: 461 MG/DL (ref 74–99)
GLUCOSE SERPL-MCNC: 170 MG/DL (ref 74–99)
HCT VFR BLD AUTO: 27.2 % (ref 41–52)
HDLC SERPL-MCNC: 66.2 MG/DL
HGB BLD-MCNC: 9.2 G/DL (ref 13.5–17.5)
IMM GRANULOCYTES # BLD AUTO: 0.02 X10*3/UL (ref 0–0.7)
IMM GRANULOCYTES NFR BLD AUTO: 0.4 % (ref 0–0.9)
LABORATORY COMMENT REPORT: NORMAL
LDLC SERPL CALC-MCNC: 66 MG/DL
LYMPHOCYTES # BLD AUTO: 1.94 X10*3/UL (ref 1.2–4.8)
LYMPHOCYTES NFR BLD AUTO: 35.3 %
MCH RBC QN AUTO: 28.8 PG (ref 26–34)
MCHC RBC AUTO-ENTMCNC: 33.8 G/DL (ref 32–36)
MCV RBC AUTO: 85 FL (ref 80–100)
MONOCYTES # BLD AUTO: 0.55 X10*3/UL (ref 0.1–1)
MONOCYTES NFR BLD AUTO: 10 %
NEUTROPHILS # BLD AUTO: 2.81 X10*3/UL (ref 1.2–7.7)
NEUTROPHILS NFR BLD AUTO: 51.2 %
NON HDL CHOLESTEROL: 79 MG/DL (ref 0–149)
NRBC BLD-RTO: 0 /100 WBCS (ref 0–0)
P AXIS: 57 DEGREES
P AXIS: 77 DEGREES
P OFFSET: 201 MS
P OFFSET: 205 MS
P ONSET: 155 MS
P ONSET: 168 MS
PATH REPORT.COMMENTS IMP SPEC: NORMAL
PATH REPORT.FINAL DX SPEC: NORMAL
PATH REPORT.GROSS SPEC: NORMAL
PATH REPORT.RELEVANT HX SPEC: NORMAL
PATH REPORT.TOTAL CANCER: NORMAL
PLATELET # BLD AUTO: 206 X10*3/UL (ref 150–450)
POTASSIUM SERPL-SCNC: 3.8 MMOL/L (ref 3.5–5.3)
PR INTERVAL: 102 MS
PR INTERVAL: 128 MS
PROT SERPL-MCNC: 5.1 G/DL (ref 6.4–8.2)
Q ONSET: 219 MS
Q ONSET: 219 MS
QRS COUNT: 11 BEATS
QRS COUNT: 13 BEATS
QRS DURATION: 104 MS
QRS DURATION: 90 MS
QT INTERVAL: 438 MS
QT INTERVAL: 472 MS
QTC CALCULATION(BAZETT): 492 MS
QTC CALCULATION(BAZETT): 498 MS
QTC FREDERICIA: 473 MS
QTC FREDERICIA: 490 MS
R AXIS: 49 DEGREES
R AXIS: 79 DEGREES
RBC # BLD AUTO: 3.19 X10*6/UL (ref 4.5–5.9)
SODIUM SERPL-SCNC: 132 MMOL/L (ref 136–145)
T AXIS: 60 DEGREES
T AXIS: 9 DEGREES
T OFFSET: 438 MS
T OFFSET: 455 MS
TRIGL SERPL-MCNC: 65 MG/DL (ref 0–149)
VENTRICULAR RATE: 67 BPM
VENTRICULAR RATE: 76 BPM
VLDL: 13 MG/DL (ref 0–40)
WBC # BLD AUTO: 5.5 X10*3/UL (ref 4.4–11.3)

## 2025-01-30 PROCEDURE — 84075 ASSAY ALKALINE PHOSPHATASE: CPT | Performed by: INTERNAL MEDICINE

## 2025-01-30 PROCEDURE — 2500000004 HC RX 250 GENERAL PHARMACY W/ HCPCS (ALT 636 FOR OP/ED)

## 2025-01-30 PROCEDURE — 2500000002 HC RX 250 W HCPCS SELF ADMINISTERED DRUGS (ALT 637 FOR MEDICARE OP, ALT 636 FOR OP/ED): Performed by: SURGERY

## 2025-01-30 PROCEDURE — 2500000002 HC RX 250 W HCPCS SELF ADMINISTERED DRUGS (ALT 637 FOR MEDICARE OP, ALT 636 FOR OP/ED): Performed by: INTERNAL MEDICINE

## 2025-01-30 PROCEDURE — 2500000001 HC RX 250 WO HCPCS SELF ADMINISTERED DRUGS (ALT 637 FOR MEDICARE OP): Performed by: INTERNAL MEDICINE

## 2025-01-30 PROCEDURE — 82947 ASSAY GLUCOSE BLOOD QUANT: CPT

## 2025-01-30 PROCEDURE — G0407 INPT/TELE FOLLOW UP 25: HCPCS | Performed by: STUDENT IN AN ORGANIZED HEALTH CARE EDUCATION/TRAINING PROGRAM

## 2025-01-30 PROCEDURE — 51701 INSERT BLADDER CATHETER: CPT

## 2025-01-30 PROCEDURE — 99232 SBSQ HOSP IP/OBS MODERATE 35: CPT | Performed by: STUDENT IN AN ORGANIZED HEALTH CARE EDUCATION/TRAINING PROGRAM

## 2025-01-30 PROCEDURE — 99233 SBSQ HOSP IP/OBS HIGH 50: CPT | Performed by: INTERNAL MEDICINE

## 2025-01-30 PROCEDURE — 36415 COLL VENOUS BLD VENIPUNCTURE: CPT | Performed by: INTERNAL MEDICINE

## 2025-01-30 PROCEDURE — 97116 GAIT TRAINING THERAPY: CPT | Mod: GP

## 2025-01-30 PROCEDURE — 85025 COMPLETE CBC W/AUTO DIFF WBC: CPT | Performed by: INTERNAL MEDICINE

## 2025-01-30 PROCEDURE — 2060000001 HC INTERMEDIATE ICU ROOM DAILY

## 2025-01-30 PROCEDURE — 97110 THERAPEUTIC EXERCISES: CPT | Mod: GP

## 2025-01-30 PROCEDURE — 2500000004 HC RX 250 GENERAL PHARMACY W/ HCPCS (ALT 636 FOR OP/ED): Performed by: INTERNAL MEDICINE

## 2025-01-30 RX ORDER — INSULIN LISPRO 100 [IU]/ML
8 INJECTION, SOLUTION INTRAVENOUS; SUBCUTANEOUS
Status: DISCONTINUED | OUTPATIENT
Start: 2025-01-31 | End: 2025-02-01

## 2025-01-30 RX ADMIN — ASPIRIN 81 MG CHEWABLE TABLET 81 MG: 81 TABLET CHEWABLE at 11:02

## 2025-01-30 RX ADMIN — HEPARIN SODIUM 5000 UNITS: 5000 INJECTION, SOLUTION INTRAVENOUS; SUBCUTANEOUS at 21:18

## 2025-01-30 RX ADMIN — POLYETHYLENE GLYCOL 3350 17 G: 17 POWDER, FOR SOLUTION ORAL at 11:02

## 2025-01-30 RX ADMIN — METOCLOPRAMIDE HYDROCHLORIDE 10 MG: 5 INJECTION INTRAMUSCULAR; INTRAVENOUS at 13:07

## 2025-01-30 RX ADMIN — TAMSULOSIN HYDROCHLORIDE 0.4 MG: 0.4 CAPSULE ORAL at 06:43

## 2025-01-30 RX ADMIN — INSULIN LISPRO 15 UNITS: 100 INJECTION, SOLUTION INTRAVENOUS; SUBCUTANEOUS at 15:46

## 2025-01-30 RX ADMIN — HEPARIN SODIUM 5000 UNITS: 5000 INJECTION, SOLUTION INTRAVENOUS; SUBCUTANEOUS at 15:46

## 2025-01-30 RX ADMIN — PANTOPRAZOLE SODIUM 40 MG: 40 INJECTION, POWDER, FOR SOLUTION INTRAVENOUS at 11:02

## 2025-01-30 RX ADMIN — METOCLOPRAMIDE HYDROCHLORIDE 10 MG: 5 INJECTION INTRAMUSCULAR; INTRAVENOUS at 00:17

## 2025-01-30 RX ADMIN — INSULIN LISPRO 15 UNITS: 100 INJECTION, SOLUTION INTRAVENOUS; SUBCUTANEOUS at 17:45

## 2025-01-30 RX ADMIN — HEPARIN SODIUM 5000 UNITS: 5000 INJECTION, SOLUTION INTRAVENOUS; SUBCUTANEOUS at 06:43

## 2025-01-30 RX ADMIN — ATORVASTATIN CALCIUM 40 MG: 40 TABLET, FILM COATED ORAL at 21:18

## 2025-01-30 RX ADMIN — METOPROLOL TARTRATE 50 MG: 50 TABLET, FILM COATED ORAL at 21:18

## 2025-01-30 RX ADMIN — METOCLOPRAMIDE HYDROCHLORIDE 10 MG: 5 INJECTION INTRAMUSCULAR; INTRAVENOUS at 06:43

## 2025-01-30 RX ADMIN — OXYCODONE HYDROCHLORIDE AND ACETAMINOPHEN 1 TABLET: 5; 325 TABLET ORAL at 21:18

## 2025-01-30 RX ADMIN — METOCLOPRAMIDE HYDROCHLORIDE 10 MG: 5 INJECTION INTRAMUSCULAR; INTRAVENOUS at 17:45

## 2025-01-30 RX ADMIN — INSULIN LISPRO 3 UNITS: 100 INJECTION, SOLUTION INTRAVENOUS; SUBCUTANEOUS at 04:39

## 2025-01-30 RX ADMIN — METOPROLOL TARTRATE 50 MG: 50 TABLET, FILM COATED ORAL at 11:02

## 2025-01-30 RX ADMIN — SODIUM CHLORIDE 100 ML/HR: 900 INJECTION, SOLUTION INTRAVENOUS at 00:17

## 2025-01-30 RX ADMIN — PANTOPRAZOLE SODIUM 40 MG: 40 INJECTION, POWDER, FOR SOLUTION INTRAVENOUS at 21:18

## 2025-01-30 ASSESSMENT — COGNITIVE AND FUNCTIONAL STATUS - GENERAL
DAILY ACTIVITIY SCORE: 17
DAILY ACTIVITIY SCORE: 24
WALKING IN HOSPITAL ROOM: A LITTLE
CLIMB 3 TO 5 STEPS WITH RAILING: TOTAL
TOILETING: A LOT
MOBILITY SCORE: 22
HELP NEEDED FOR BATHING: A LOT
DRESSING REGULAR UPPER BODY CLOTHING: A LITTLE
DRESSING REGULAR LOWER BODY CLOTHING: A LOT
MOVING TO AND FROM BED TO CHAIR: A LOT
TURNING FROM BACK TO SIDE WHILE IN FLAT BAD: A LITTLE
MOBILITY SCORE: 15
STANDING UP FROM CHAIR USING ARMS: A LOT
CLIMB 3 TO 5 STEPS WITH RAILING: A LITTLE
WALKING IN HOSPITAL ROOM: A LITTLE

## 2025-01-30 ASSESSMENT — PAIN SCALES - GENERAL
PAINLEVEL_OUTOF10: 0 - NO PAIN
PAINLEVEL_OUTOF10: 7
PAINLEVEL_OUTOF10: 0 - NO PAIN

## 2025-01-30 ASSESSMENT — PAIN - FUNCTIONAL ASSESSMENT
PAIN_FUNCTIONAL_ASSESSMENT: 0-10
PAIN_FUNCTIONAL_ASSESSMENT: 0-10

## 2025-01-30 ASSESSMENT — PAIN SCALES - WONG BAKER: WONGBAKER_NUMERICALRESPONSE: NO HURT

## 2025-01-30 ASSESSMENT — ACTIVITIES OF DAILY LIVING (ADL): HOME_MANAGEMENT_TIME_ENTRY: 23

## 2025-01-30 NOTE — PROGRESS NOTES
"Devin Gomez is a 64 y.o. male on day 9 of admission presenting with Dehydration.    Subjective   Resting comfortably.  Sinus rhythm on telemetry       Objective     Physical Exam   Gen: NAD   Neck: no JVD, carotid upstroke is brisk and without delay   Heart: rrr, s1s2+ no mrg   Lungs: CTA   Ext: warm no edema  Last Recorded Vitals  Blood pressure 126/54, pulse 68, temperature 36.8 °C (98.2 °F), temperature source Temporal, resp. rate 15, height 1.753 m (5' 9\"), weight 66.2 kg (145 lb 15.1 oz), SpO2 100%.  Intake/Output last 3 Shifts:  I/O last 3 completed shifts:  In: 2650.9 (40 mL/kg) [P.O.:830; I.V.:1071.7 (16.2 mL/kg); IV Piggyback:749.3]  Out: 1135 (17.1 mL/kg) [Urine:1135 (0.5 mL/kg/hr)]  Weight: 66.2 kg       Assessment/Plan   Assessment & Plan  Dehydration    NSTEMI (non-ST elevated myocardial infarction) (Multi)    Diabetic ketoacidosis without coma associated with type 2 diabetes mellitus (Multi)    Pancolitis (Multi)    Vomiting    Bladder retention    Ambulatory dysfunction    Syncope and collapse    1/27: We have seen a rise and fall in his high-sensitivity troponins despite any particular intervention for acute coronary syndrome. That being said with known peripheral arterial disease it would be worth risk stratifying him with a myocardial SPECT stress test. We can accomplish this tomorrow morning. Will continue to follow.     1/28: Plan was initially for a Lexiscan SPECT stress test today however he is headed to [place] for endoscopy.  Will try to coordinate this with cardiopulmonary perhaps he can get his stress test while he is there versus when he comes back.     1/29: Patient did undergo Lexiscan stress yesterday which reveals moderate size fixed defect in the inferior basal mid myocardium no evidence of reversible ischemia.  Will keep him on 81 mg daily aspirin and high potency statin with 40 mg Lipitor.  Will also continue his 50 mg twice daily metoprolol.  No other workup necessary at this " point from a cardiac standpoint.  In regards to his syncopal episode this morning would recommend checking orthostatic vital signs as patient is on the hypertensive side this morning most recent blood pressure systolically 105/52.  Clinically appears euvolemic.  Saturating well on room air pulse oximetry at 100%.  Remains in normal sinus rhythm on telemetry.    1/30: Resting comfortably.  EKG shows sinus rhythm without any concern for advanced conduction system disease.  He has been sinus rhythm on telemetry without any events.  High-sensitivity troponins continue to downtrend from admission significantly.  No further cardiac workup or intervention is suggested at this time.  Would continue his aspirin and high potency statin.  He should follow-up with Dr. Vidales in 2 to 3 weeks.     I  Kimani Solano, DO

## 2025-01-30 NOTE — PROGRESS NOTES
Subjective:  Patient seen virtually today. Reports doing well. No further episodes. No feelings of hot/cold/sweats/freezing today.     Objective:  Exam:  Vitals:    01/30/25 1223   BP: 128/52   Pulse: 70   Resp:    Temp: 36.6 °C (97.9 °F)   SpO2: 98%     Exam performed virtually via video.  Patient is sitting up in chair, in NAD  Language without dysarthria or aphasia  Gaze midline, EOM appears full range horizontally and vertically   Face is symmetric on eyeclosure, eyebrow raise and smile  Tongue midline   No drift in both arms and no drift in both legs  Sensation limited to light touch up to knee, bilateral hands       Imaging Results:  MRI: MR brain wo IV contrast    Result Date: 1/30/2025  There are MRI findings compatible with a small 7 mm focus of acute to early subacute infarction within the left parietal lobe.   There is moderate brain parenchymal volume loss.   There are scattered as well as more patchy nonspecific white matter changes within the cerebral hemispheres bilaterally as well as abnormal bright signal on the FLAIR and T2 weighted images overlying the brainstem which while nonspecific, given the patient's age, likely represent sequelae of more remote small-vessel ischemic change. Additional small foci of bright signal on the T2 weighted images are identified within the subinsular regions, basal ganglia, and thalami bilaterally suggesting small scattered more remote lacunar infarctions as well as incidental mildly prominent perivascular spaces.   MACRO: Critical Finding:  See findings. Notification was initiated on 1/30/2025 at 3:28 am by  Devin Smith.  (**-OCF-**)   Signed by: Devin Smith 1/30/2025 3:28 AM Dictation workstation:   [id number]   CT Head: CT head wo IV contrast    Result Date: 1/29/2025  No significant interval change since previous exam. No evidence of acute cortical infarct or intracranial hemorrhage. If symptoms persist, further evaluation with MRI should be considered for  better assessment.   MACRO: None   Signed by: Domitila Dejesus 1/29/2025 11:18 AM Dictation workstation:   [id number]    CT head wo IV contrast    Result Date: 1/23/2025  1. Area of diminished attenuation along the left side of the past possibly representing an area of remote lacunar infarction. Correlation with MRI of the brain without contrast would be useful for more definitive assessment. 2. Supratentorial atrophy is noted.       MACRO: none   Signed by: Mahesh Mendez 1/23/2025 3:59 PM Dictation workstation:   [id number]   CTA: No CT Angio Head Results found for the past 14 days  Echo: No echocardiogram results found for the past 14 days      ASSESSMENT:  64 y.o. male with hx of DM presenting with hyperglycemia, had episode of LOC with shaking today in the setting of his usual hyperglycemic sx and bradycardia, concerning for convulsive syncope. However given poor documentation about duration of the event and this being first time having LOC event would evaluate with EEG, and it was unremarkable. MRI was also done per primary yesterday - indication 'seizure evaluation'. It was unrevealing for seizure evaluation, negative for any cortical lesions, but there is evidence of chronic white matter changes along with punctate small subcortical L parietal infarct also as part of small vessel disease.     Pt has already been started on aspirin this admission. He has had echocardiogram as well 1/22/2025 which was unrevealing. Lipid panel added on.    LDL 66, A1c 10.2     Diagnosis:   Convulsive syncope suspected   Incidental punctate subcortical stroke likely small vessel disease      RECOMMENDATIONS:   - no further neurological evaluation  - continue aspirin 81mg daily, not considered aspirin failure with uncontrolled diabetes   - A1c goal <7   - LDL goal <70, pt is at goal already     Thank you for the consult. Will sign off. Please do not hesitate to reach out with any questions or any new change in patient's  neurological status.     Thu Jamison MD   Neurology and Vascular Neurology         Consult completed by: TELEPHONE and VIDEO interactive communication was used to provide this telehealth service. Time includes consultation with ED provider and extensive review of data- history, medical records, lab/radiology/medical test results, neuroimaging studies:  25 minutes was spent in FOLLOW-UP telehealth consultation

## 2025-01-30 NOTE — PROGRESS NOTES
"Nutrition Follow up Note    Nutrition Assessment      EGD and colonoscopy on 1/28 with biopsies taken. Rapid response yesterday for an episode of unresponsiveness.     Nutrition History:  Food and Nutrient History: reports fair appetite but complains that the meals he recieves are too dry. he is drinking ensure occasionally - \"afraid they're going to run right through me\". prefers vanilla ensure.  Energy Intake: Fair 50-75 %    Anthropometrics:  Ht: 175.3 cm (5' 9\"), Wt: 66.2 kg (145 lb 15.1 oz), BMI: 21.54  IBW/kg (Dietitian Calculated): 72.73 kg  Percent of IBW: 82 %    Weight Change:  Daily Weight  01/30/25 1231 66.2 kg (145 lb 15.1 oz)   01/30/25 0406 66.2 kg (145 lb 15.1 oz)   01/29/25 0300 60.1 kg (132 lb 7.9 oz)   01/28/25 0300 59.5 kg (131 lb 2.8 oz)   01/27/25 0700 61.3 kg (135 lb 2.3 oz)    Weight: obtained by Dahlia TORRES at 01/27/25 0700   01/27/25 0329 61.4 kg (135 lb 4.8 oz)   01/26/25 1051 61 kg (134 lb 7.7 oz)   01/25/25 1257 61 kg (134 lb 7.7 oz)   01/24/25 0737 61 kg (134 lb 7.7 oz)   01/24/25 0412 61.1 kg (134 lb 9.6 oz)   01/23/25 0755 61 kg (134 lb 7.7 oz)   01/23/25 0311 61.1 kg (134 lb 12.8 oz)   01/22/25 1252 60 kg (132 lb 4.4 oz)   01/22/25 0455 59.8 kg (131 lb 12.8 oz)   01/21/25 1857 60.8 kg (134 lb)   01/21/25 1207 65.8 kg (145 lb)   06/03/24 : 64.4 kg (142 lb)  04/10/23 : 65.8 kg (145 lb)  01/04/22 : 67.1 kg (148 lb)     Weight History / % Weight Change: reprots usual wt 145-150#. confirms current wt of 135# - believes this 10# (6.8%) wt loss occured over the past 1 week  Significant Weight Loss: Yes  Interpretation of Weight Loss: >2% in 1 week    Nutrition Focused Physical Exam Findings:   Subcutaneous Fat Loss  Orbital Fat Pads: Well nourished (slightly bulging fat pads)  Buccal Fat Pads: Mild-Moderate (flat cheeks, minimal bounce)    Muscle Wasting  Temporalis: Mild-Moderate (slight depression)  Pectoralis (Clavicular Region): Mild-Moderate (some protrusion of clavicle)  Deltoid/Trapezius: " Mild-Moderate (slight protrusion of acromion process)  Trapezius/Infraspinatus/Supraspinatus (Scapular Region): Severe (prominent visual scapula, depression between ribs, scapula or shoulder)  Quadriceps: Severe (depressions on inner and outer thigh)  Gastrocnemius: Severe (minimal muscle definition)    Nutrition Significant Labs:  Lab Results   Component Value Date    WBC 5.5 01/30/2025    HGB 9.2 (L) 01/30/2025    HCT 27.2 (L) 01/30/2025     01/30/2025    CHOL 145 01/29/2025    TRIG 65 01/29/2025    HDL 66.2 01/29/2025    ALT 25 01/30/2025    AST 19 01/30/2025     (L) 01/30/2025    K 3.8 01/30/2025     01/30/2025    CREATININE 1.38 (H) 01/30/2025    BUN 27 (H) 01/30/2025    CO2 23 01/30/2025    TSH 2.20 01/21/2025    PSA 0.2 11/02/2020    HGBA1C 10.2 (H) 01/21/2025     Nutrition Specific Medications:  aspirin, 81 mg, oral, Daily  atorvastatin, 40 mg, oral, Nightly  heparin, 5,000 Units, subcutaneous, q8h  insulin lispro, 0-15 Units, subcutaneous, q4h  metoclopramide, 10 mg, intravenous, q6h EUFEMIA  metoprolol tartrate, 50 mg, oral, BID  pantoprazole, 40 mg, intravenous, BID  polyethylene glycol, 17 g, oral, TID  tamsulosin, 0.4 mg, oral, Daily before breakfast      sodium chloride 0.9%, 100 mL/hr, Last Rate: 100 mL/hr (01/30/25 0406)      Dietary Orders (From admission, onward)       Start     Ordered    01/30/25 1306  Oral nutritional supplements  Until discontinued        Comments: Vanilla   Question Answer Comment   Deliver with Dinner    Deliver with Breakfast    Select supplement: Ensure High Protein        01/30/25 1305    01/28/25 1309  Adult diet Regular  Diet effective now        Question:  Diet type  Answer:  Regular    01/28/25 1308    01/22/25 1247  May Participate in Room Service  ( ROOM SERVICE MAY PARTICIPATE)  Once        Question:  .  Answer:  Yes    01/22/25 1246                  Estimated Needs:   Estimated Energy Needs  Total Energy Estimated Needs in 24 hours (kCal): 1786  kCal  Energy Estimated Needs per kg Body Weight in 24 hours (kCal/kg): 30 kCal/kg  Method for Estimating Needs: actual wt    Estimated Protein Needs  Total Protein Estimated Needs in 24 Hours (g): 71 g  Protein Estimated Needs per kg Body Weight in 24 Hours (g/kg): 1.2 g/kg  Method for Estimating 24 Hour Protein Needs: actual wt    Estimated Fluid Needs  Method for Estimating 24 Hour Fluid Needs: 1 ml/kcal or per MD        Nutrition Diagnosis   Nutrition Diagnosis:  Malnutrition Diagnosis  Patient has Malnutrition Diagnosis: Yes  Diagnosis Status: Active  Malnutrition Diagnosis: Severe malnutrition related to acute disease or injury  As Evidenced by: 10# (6.8%) wt loss over 1 week, moderate/severe subcutaneous fat loss and muscle wasting, po intake </= 50% estimated needs for >/= 5 days    Nutrition Diagnosis  Patient has Nutrition Diagnosis: Yes  Diagnosis Status (1): Active  Nutrition Diagnosis 1: Altered nutrition related to laboratory values  Related to (1): physiological causes  As Evidenced by (1): A1c 10.2% (1/21/25)       Nutrition Interventions/Recommendations   Nutrition Interventions and Recommendations:  Nutrition Prescription: Nutrition prescription for oral nutrition    Nutrition Recommendations:  Individualized Nutrition Prescription Provided for : 1786 kcals and 71g protein to be provided via diet and supplements    Nutrition Interventions/Goals:   Food and/or Nutrient Delivery Interventions  Interventions: Meals and snacks, Medical food supplement  Meals and Snacks: Carbohydrate-modified diet  Goal: CCD 60g diet when able to advance  Medical Food Supplement: Commercial beverage medical food supplement therapy  Goal: vanilla ensure high protein BID daily to provide 160 kcals and 16g protein each    Education Documentation  No documentation found.           Nutrition Monitoring and Evaluation   Monitoring/Evaluation:   Food/Nutrient Related History Monitoring  Monitoring and Evaluation Plan: Estimated  Energy Intake  Estimated Energy Intake: Energy intake greater or equal to 75% of estimated energy needs    Anthropometric Measurements  Monitoring and Evaluation Plan: Body weight  Body Weight: Body weight - Maintain stable weight    Biochemical Data, Medical Tests and Procedures  Monitoring and Evaluation Plan: Glucose/endocrine profile  Glucose/Endocrine Profile: Glucose within normal limits - ICU (140-180 mg/dL)    Goal Status: Some progress toward goal(s)       Time Spent (min): 30 minutes

## 2025-01-30 NOTE — PROGRESS NOTES
Physical Therapy Treatment    Patient Name: Devin Gomez  MRN: [id number]  Department: [facility] 4 ICU  Room: [address] [place], [place] [address]  Today's Date: 1/30/2025  Time Calculation  Start Time: 1445  Stop Time: 1509  Time Calculation (min): 24 min         Assessment/Plan   PT Assessment  Barriers to Discharge Home: Physical needs, Caregiver assistance  Caregiver Assistance: Patient lives alone and/or does not have reliable caregiver assistance  Physical Needs: Intermittent mobility assistance needed, High falls risk due to function or environment, Other (Comment)  Evaluation/Treatment Tolerance: Patient tolerated treatment well  Medical Staff Made Aware: Yes  End of Session Communication: Bedside nurse  Assessment Comment: Unsteady on feet and with increased fall risk warranting skilled PT care  End of Session Patient Position: Up in chair, Alarm on  PT Plan  Inpatient/Swing Bed or Outpatient: Inpatient  PT Plan  Treatment/Interventions: Bed mobility, Transfer training, Gait training, Balance training, Strengthening, Endurance training, Therapeutic exercise, Therapeutic activity  PT Plan: Ongoing PT  PT Frequency: 3 times per week  PT Discharge Recommendations: Moderate intensity level of continued care  Equipment Recommended upon Discharge: Wheeled walker, Wheelchair  PT Recommended Transfer Status: Assist x1, Assistive device  PT - OK to Discharge: Yes      General Visit Information:   PT  Visit  PT Received On: 01/30/25  Response to Previous Treatment: Patient with no complaints from previous session.  General  Family/Caregiver Present: No  Prior to Session Communication: Bedside nurse  Patient Position Received: Bed, 3 rail up, Alarm on  Preferred Learning Style: verbal, visual  General Comment: Agreeable to treatment    Subjective   Precautions:  Precautions  Medical Precautions: Fall precautions    Objective   Pain:  Pain Assessment  Pain Assessment: 0-10  0-10 (Numeric) Pain Score: 0 - No pain  Cognition:  Cognition  Overall  Cognitive Status: Impaired  Orientation Level: Disoriented to time  Following Commands: Follows all commands and directions without difficulty  Cognition Comments: seems mildly confused and delayed at times. Flat affect but pleasant  Insight: Mild  Impulsive: Moderately    Activity Tolerance:  Activity Tolerance  Endurance: Decreased tolerance for upright activites  Treatments:  Therapeutic Exercise  Therapeutic Exercise Performed: Yes  Therapeutic Exercise Activity 2: Pt educated on and completes B ankle pumps x20, Knee Kicks x10, Seated Knee Marches x10, Resisted Hip Abd x10, Resisted Hip Add x10, and Glute sets x10    Bed Mobility  Bed Mobility: Yes  Bed Mobility 1  Bed Mobility 1: Supine to sitting, Sitting to supine  Level of Assistance 1: Close supervision  Bed Mobility Comments 1: x2 trials of bed mobility today--CS for safety near EOB and cues on technique    Ambulation/Gait Training  Ambulation/Gait Training Performed: Yes  Ambulation/Gait Training 1  Surface 1: Level tile  Device 1: Standard walker  Assistance 1: Minimum assistance  Quality of Gait 1:  (limted B DF, limited knee flexion leading to hip hiking, walking on heel LLE, impulsive and quick to advance/turn AD, mildly unsteady at all times with increased sway. Cues to correct deviations. Assist to steady)  Comments/Distance (ft) 1: 20  Transfers  Transfer: Yes  Transfer 1  Transfer From 1: Bed to  Transfer to 1: Stand  Technique 1: Sit to stand, Stand to sit  Transfer Device 1: Walker  Transfer Level of Assistance 1: Moderate assistance  Trials/Comments 1: x2 trials--between partial standing and full erect posture, Pt is very unsteady w/ posterior LOB both times needing quick assist to correct and prevent fall. Once in standing, Min A to static stand and control sway. Cues for AD use and techniques. Tolerates standing for ~5mins x2 trials  Transfers 2  Transfer From 2: Stand to  Transfer to 2: Chair with arms  Technique 2: Stand to sit  Transfer  Device 2: Walker  Transfer Level of Assistance 2: Minimum assistance  Trials/Comments 2: to control descent and prevent plopping. Cues to reach back and grab onto arm rests    Outcome Measures:  [facility] Basic Mobility  Turning from your back to your side while in a flat bed without using bedrails: None  Moving from lying on your back to sitting on the side of a flat bed without using bedrails: A little  Moving to and from bed to chair (including a wheelchair): A lot  Standing up from a chair using your arms (e.g. wheelchair or bedside chair): A lot  To walk in hospital room: A little  Climbing 3-5 steps with railing: Total  Basic Mobility - Total Score: 15    Education Documentation  Home Exercise Program, taught by Ever Arroyo, PT at 1/30/2025  3:27 PM.  Learner: Patient  Readiness: Acceptance  Method: Explanation, Demonstration  Response: Needs Reinforcement  Comment: safe mobility techniques, HEP    Mobility Training, taught by Ever Arroyo, PT at 1/30/2025  3:27 PM.  Learner: Patient  Readiness: Acceptance  Method: Explanation, Demonstration  Response: Needs Reinforcement  Comment: safe mobility techniques, HEP    Education Comments  No comments found.        OP EDUCATION:       Encounter Problems       Encounter Problems (Active)       Balance       dynamic (Progressing)       Start:  01/22/25    Expected End:  02/05/25       Pt will perform Tinetti assessment and score >/= 24/28, resulting in a low falls risk             Mobility       LTG - Patient will be able to go up and down a curb/step with the appropriate device (Progressing)       Start:  01/22/25    Expected End:  02/05/25            LTG - Patient will navigate 4-6 steps with rails/device (Progressing)       Start:  01/22/25    Expected End:  02/05/25            ambulation (Progressing)       Start:  01/22/25    Expected End:  02/05/25       Pt will amb > 100 ft with wheeled walker/LRAD and mod independence          endurance (Progressing)        Start:  01/22/25    Expected End:  02/05/25       Pt will tolerate > 20 minutes of activity with stable vital signs and decreased c/o SOB/nausea.            PT Transfers       sit to stand (Progressing)       Start:  01/22/25    Expected End:  02/05/25       Pt will perform sit to stand transfer with LRAD and mod independence.             Pain - Adult          Safety       LTG - Patient will utilize safety techniques (Progressing)       Start:  01/22/25    Expected End:  02/05/25

## 2025-01-30 NOTE — CARE PLAN
Provider at bedside -- gives pt glass of water.   The patient's goals for the shift include      The clinical goals for the shift include Patient will remain hemodynamically stable and get rest throughout shift.

## 2025-01-30 NOTE — PROGRESS NOTES
"Hospitalist Progress Note  Devin Gomez is a 64 y.o. male who presented with Hyperglycemia (Pt bib ems for hyperglycemia, felt nauseous but no vomiting, denies sob, bs 300 on arrival) and is on day 9 of admission for Dehydration    Subjective    Sitting up on bedside commode, reports to MD that he is done and MD does not need to come back later         Objective    Blood pressure 126/54, pulse 68, temperature 36.8 °C (98.2 °F), temperature source Temporal, resp. rate 15, height 1.753 m (5' 9\"), weight 66.2 kg (145 lb 15.1 oz), SpO2 100%.  Vitals Reviewed: yes  Constitutional: sitting up on bedside commode  Eyes: EOMI, normal conjunctiva  HENT: moist mucus membranes, airway patent  Pulm: no wheezes, no rhonchi, no crackles, no coarse breath sounds  Cardiac: reg rate, regular rhythm, no murmur, equal pulses  GI: Soft, non tender, non-distended, normal bowel sounds  MSK: no lower extremity edema  Neuro: No focal deficit, oriented, CN II-XII grossly intact  Psych: Cooperative, flat affect, unclear judgement      Intake/Output Summary (Last 24 hours) at 1/30/2025 1137  Last data filed at 1/30/2025 1100  Gross per 24 hour   Intake 2300.92 ml   Output 725 ml   Net 1575.92 ml       Relevant Results  EEG  Result Date: 1/30/2025  IMPRESSION This routine EEG is normal. No epileptiform discharges or lateralizing signs are seen. A full report will be scanned into the patient's chart at a later time. This report has been interpreted and electronically signed by    MR brain wo IV contrast  Result Date: 1/30/2025  There are MRI findings compatible with a small 7 mm focus of acute to early subacute infarction within the left parietal lobe.   There is moderate brain parenchymal volume loss.   There are scattered as well as more patchy nonspecific white matter changes within the cerebral hemispheres bilaterally as well as abnormal bright signal on the FLAIR and T2 weighted images overlying the brainstem which while nonspecific, " given the patient's age, likely represent sequelae of more remote small-vessel ischemic change. Additional small foci of bright signal on the T2 weighted images are identified within the subinsular regions, basal ganglia, and thalami bilaterally suggesting small scattered more remote lacunar infarctions as well as incidental mildly prominent perivascular spaces.       Stress test with myocardial perfusion  Result Date: 1/29/2025  1. A moderate size territory of severe fixed perfusion defect involving the inferior basal to mid myocardium compatible with prior infarct. No reversible perfusion defects to suggest stress-induced myocardial ischemia. 2. The left ventricle is normal in size. 3. Normal LV wall motion with an LV EF estimated at greater than 65%    XR chest 1 view  Result Date: 1/29/2025  No acute cardiopulmonary process.       CT head wo IV contrast  Result Date: 1/29/2025  No significant interval change since previous exam. No evidence of acute cortical infarct or intracranial hemorrhage. If symptoms persist, further evaluation with MRI should be considered for better assessment.       Scheduled medications  aspirin, 81 mg, oral, Daily  atorvastatin, 40 mg, oral, Nightly  heparin, 5,000 Units, subcutaneous, q8h  insulin lispro, 0-15 Units, subcutaneous, q4h  metoclopramide, 10 mg, intravenous, q6h EUFEMIA  metoprolol tartrate, 50 mg, oral, BID  pantoprazole, 40 mg, intravenous, BID  polyethylene glycol, 17 g, oral, TID  tamsulosin, 0.4 mg, oral, Daily before breakfast      Continuous medications  sodium chloride 0.9%, 100 mL/hr, Last Rate: 100 mL/hr (01/30/25 0406)      PRN medications  PRN medications: acetaminophen **OR** acetaminophen **OR** acetaminophen, dextrose, dextrose, dextrose, glucagon, glucagon, heparin (porcine), hydrALAZINE, melatonin, ondansetron ODT **OR** ondansetron, oxyCODONE-acetaminophen        Assessment    Assessment & Plan  Dehydration  -resolved  Syncope and collapse  -unclear syncope  vs seizure activity   -CT without acute change, MRI showing small vessel ischemic changes and 7mm subcortical parietal infarct that is either acute or subacute  -neurology consulted and following  -EEG without epileptiform discharges  NSTEMI (non-ST elevated myocardial infarction) (Multi)  -cardiology evaluated, troponins downtrended  -no further cardiac work-up indicated per 01/30 note  Diabetic ketoacidosis without coma associated with type 2 diabetes mellitus (Multi)  -continue SSI  Pancolitis (Multi)  -improving  Vomiting  -resolved  Bladder retention  -resolved  Ambulatory dysfunction  -PT/OT      DVT prophylaxis: Heparin SubQ  Disposition: Plan for SNF at DC        I spent 40 minutes in the professional and overall care of this patient.    Jenn Braun

## 2025-01-30 NOTE — ASSESSMENT & PLAN NOTE
-unclear syncope vs seizure activity   -CT without acute change, MRI showing small vessel ischemic changes and 7mm subcortical parietal infarct that is either acute or subacute  -neurology consulted and following  -EEG without epileptiform discharges

## 2025-01-30 NOTE — PROGRESS NOTES
"Occupational Therapy    OT Treatment    Patient Name: Devin Gomez  MRN: [id number]  Department: [facility] 4 ICU  Room: [address] [place], [place] [address]  Today's Date: 1/30/2025  Time Calculation  Start Time: 1128  Stop Time: 1214  Time Calculation (min): 46 min        Assessment:  OT Assessment: Pt initially wanting to walk to bathroom to clean up, once standing, pt states \" I just dont think I can make it there today\", Pt making slow progress toward POC goals, continue to recommend OT services this acute stay.  Prognosis: Good  Barriers to Discharge Home: Caregiver assistance, Physical needs  Caregiver Assistance: Patient lives alone and/or does not have reliable caregiver assistance  Physical Needs: Intermittent mobility assistance needed, Intermittent ADL assistance needed  Evaluation/Treatment Tolerance: Patient limited by fatigue, Patient tolerated treatment well  End of Session Communication: Bedside nurse  End of Session Patient Position: Up in chair, Alarm on (All needs in reach.)  OT Assessment Results: Decreased ADL status, Decreased upper extremity strength, Decreased safe judgment during ADL, Decreased endurance, Decreased functional mobility, Decreased IADLs  Prognosis: Good  Evaluation/Treatment Tolerance: Patient limited by fatigue, Patient tolerated treatment well  Plan:  Treatment Interventions: ADL retraining, Functional transfer training, UE strengthening/ROM, Endurance training, Compensatory technique education, Patient/family training  OT Frequency: 3 times per week  OT Discharge Recommendations: Low intensity level of continued care  Equipment Recommended upon Discharge: Wheeled walker, Wheelchair  OT Recommended Transfer Status: Stand by assist, Assistive equipment (Comment)  OT - OK to Discharge: Yes  Treatment Interventions: ADL retraining, Functional transfer training, UE strengthening/ROM, Endurance training, Compensatory technique education, Patient/family training    Subjective   Previous Visit Info:  OT Last " Visit  OT Received On: 01/30/25  General:  General  Reason for Referral: impaired mobility, dehydration  Referred By: Dr. Knowles  Past Medical History Relevant to Rehab: DM with neuropathy, PVD, HTN, hyperlipidemia  Prior to Session Communication: Bedside nurse  Patient Position Received: Bed, 3 rail up, Alarm on  General Comment: Agreeable to treatment  Precautions:  Hearing/Visual Limitations: + glasses  Medical Precautions: Fall precautions, Seizure precautions    Pain:  Pain Assessment  Pain Assessment: 0-10  0-10 (Numeric) Pain Score: 0 - No pain    Objective    Cognition:  Cognition  Overall Cognitive Status: Within Functional Limits  Activities of Daily Living: Grooming  Grooming Level of Assistance: Setup  Grooming Where Assessed: Chair  Grooming Comments: pt brushed teeth, washed face    UE Bathing  UE Bathing Level of Assistance: Setup  UE Bathing Where Assessed: Other (Comment) (chair)  UE Bathing Comments: sponge bathing     Bed Mobility/Transfers: Bed Mobility 1  Bed Mobility 1: Supine to sitting  Level of Assistance 1: Close supervision    Transfer 1  Transfer From 1: Bed to  Transfer to 1: Chair with arms  Technique 1: To right  Transfer Device 1: Walker  Transfer Level of Assistance 1: Moderate assistance  Trials/Comments 1: cues for hand placement retro LOB with standing, additional time needed for obtain COG over ELIUD. Cues for reaching back for chair arms as pt fell into chair seated while holding onto FWW.  Outcome Measures:[facility] Daily Activity  Putting on and taking off regular lower body clothing: A lot  Bathing (including washing, rinsing, drying): A lot  Putting on and taking off regular upper body clothing: A little  Toileting, which includes using toilet, bedpan or urinal: A lot  Taking care of personal grooming such as brushing teeth: None  Eating Meals: None  Daily Activity - Total Score: 17    Education Documentation  ADL Training, taught by LEELA Taylor at 1/30/2025  2:35  PM.  Learner: Patient  Readiness: Acceptance  Method: Explanation  Response: Needs Reinforcement  Comment: safe transfer techniques    ADL Training, taught by LEELA Taylor at 1/29/2025  3:25 PM.  Learner: Patient  Readiness: Acceptance  Method: Explanation  Response: Needs Reinforcement  Comment: safe transfer techniques    Education Comments  No comments found.      Goals:  Encounter Problems       Encounter Problems (Active)       OT Goals       ADLS (Progressing)       Start:  01/22/25    Expected End:  02/05/25       Patient will complete ADL tasks, with modified independence using AE need in order to increase patient's safety and independence with self-care tasks.           Functional Transfers (Progressing)       Start:  01/22/25    Expected End:  02/05/25       Patient will complete functional transfers with modified independence using least restrictive device, in order to increase patient's safety and independence with daily tasks.           Activity Tolerance (Progressing)       Start:  01/22/25    Expected End:  02/05/25       Patient will demonstrate the ability to participate in functional activity at least >/= 20 minutes in order to increase patient's safety and independence with daily tasks.

## 2025-01-30 NOTE — ASSESSMENT & PLAN NOTE
-continue SSI   I called the patient and verified them by name and date of birth. I scheduled her for an appointment to see Dr. Gu tomorrow.

## 2025-01-30 NOTE — ASSESSMENT & PLAN NOTE
-cardiology evaluated, troponins downtrended  -no further cardiac work-up indicated per 01/30 note

## 2025-01-30 NOTE — CARE PLAN
Problem: Diabetes  Goal: Achieve decreasing blood glucose levels by end of shift  Outcome: Progressing  Goal: Increase stability of blood glucose readings by end of shift  Outcome: Progressing  Goal: Decrease in ketones present in urine by end of shift  Outcome: Progressing  Goal: Maintain electrolyte levels within acceptable range throughout shift  Outcome: Progressing  Goal: Maintain glucose levels >70mg/dl to <250mg/dl throughout shift  Outcome: Progressing  Goal: No changes in neurological exam by end of shift  Outcome: Progressing  Goal: Learn about and adhere to nutrition recommendations by end of shift  Outcome: Progressing  Goal: Vital signs within normal range for age by end of shift  Outcome: Progressing  Goal: Increase self care and/or family involovement by end of shift  Outcome: Progressing  Goal: Receive DSME education by end of shift  Outcome: Progressing     Problem: Pain - Adult  Goal: Verbalizes/displays adequate comfort level or baseline comfort level  Outcome: Progressing     Problem: Safety - Adult  Goal: Free from fall injury  Outcome: Progressing     Problem: Discharge Planning  Goal: Discharge to home or other facility with appropriate resources  Outcome: Progressing     Problem: Chronic Conditions and Co-morbidities  Goal: Patient's chronic conditions and co-morbidity symptoms are monitored and maintained or improved  Outcome: Progressing     Problem: Fall/Injury  Goal: Not fall by end of shift  Outcome: Progressing  Goal: Be free from injury by end of the shift  Outcome: Progressing  Goal: Verbalize understanding of personal risk factors for fall in the hospital  Outcome: Progressing  Goal: Verbalize understanding of risk factor reduction measures to prevent injury from fall in the home  Outcome: Progressing  Goal: Use assistive devices by end of the shift  Outcome: Progressing  Goal: Pace activities to prevent fatigue by end of the shift  Outcome: Progressing     Problem: Pain  Goal:  Takes deep breaths with improved pain control throughout the shift  Outcome: Progressing  Goal: Turns in bed with improved pain control throughout the shift  Outcome: Progressing  Goal: Walks with improved pain control throughout the shift  Outcome: Progressing  Goal: Performs ADL's with improved pain control throughout shift  Outcome: Progressing  Goal: Participates in PT with improved pain control throughout the shift  Outcome: Progressing  Goal: Free from opioid side effects throughout the shift  Outcome: Progressing  Goal: Free from acute confusion related to pain meds throughout the shift  Outcome: Progressing   The patient's goals for the shift include      The clinical goals for the shift include Patient will remain hemodynamically stable and get rest throughout shift.

## 2025-01-31 LAB
ALBUMIN SERPL BCP-MCNC: 3.2 G/DL (ref 3.4–5)
ALP SERPL-CCNC: 51 U/L (ref 33–136)
ALT SERPL W P-5'-P-CCNC: 25 U/L (ref 10–52)
ANION GAP SERPL CALCULATED.3IONS-SCNC: 16 MMOL/L (ref 10–20)
AST SERPL W P-5'-P-CCNC: 16 U/L (ref 9–39)
BASOPHILS # BLD AUTO: 0.06 X10*3/UL (ref 0–0.1)
BASOPHILS NFR BLD AUTO: 1.1 %
BILIRUB SERPL-MCNC: 0.8 MG/DL (ref 0–1.2)
BUN SERPL-MCNC: 32 MG/DL (ref 6–23)
CALCIUM SERPL-MCNC: 8.1 MG/DL (ref 8.6–10.3)
CHLORIDE SERPL-SCNC: 100 MMOL/L (ref 98–107)
CO2 SERPL-SCNC: 20 MMOL/L (ref 21–32)
CREAT SERPL-MCNC: 1.37 MG/DL (ref 0.5–1.3)
EGFRCR SERPLBLD CKD-EPI 2021: 57 ML/MIN/1.73M*2
EOSINOPHIL # BLD AUTO: 0.08 X10*3/UL (ref 0–0.7)
EOSINOPHIL NFR BLD AUTO: 1.5 %
ERYTHROCYTE [DISTWIDTH] IN BLOOD BY AUTOMATED COUNT: 13.7 % (ref 11.5–14.5)
GLUCOSE BLD MANUAL STRIP-MCNC: 160 MG/DL (ref 74–99)
GLUCOSE BLD MANUAL STRIP-MCNC: 184 MG/DL (ref 74–99)
GLUCOSE BLD MANUAL STRIP-MCNC: 185 MG/DL (ref 74–99)
GLUCOSE BLD MANUAL STRIP-MCNC: 206 MG/DL (ref 74–99)
GLUCOSE BLD MANUAL STRIP-MCNC: 206 MG/DL (ref 74–99)
GLUCOSE BLD MANUAL STRIP-MCNC: 227 MG/DL (ref 74–99)
GLUCOSE BLD MANUAL STRIP-MCNC: 246 MG/DL (ref 74–99)
GLUCOSE SERPL-MCNC: 220 MG/DL (ref 74–99)
HCT VFR BLD AUTO: 26.7 % (ref 41–52)
HGB BLD-MCNC: 9.1 G/DL (ref 13.5–17.5)
IMM GRANULOCYTES # BLD AUTO: 0.02 X10*3/UL (ref 0–0.7)
IMM GRANULOCYTES NFR BLD AUTO: 0.4 % (ref 0–0.9)
LYMPHOCYTES # BLD AUTO: 1.75 X10*3/UL (ref 1.2–4.8)
LYMPHOCYTES NFR BLD AUTO: 31.8 %
MCH RBC QN AUTO: 29 PG (ref 26–34)
MCHC RBC AUTO-ENTMCNC: 34.1 G/DL (ref 32–36)
MCV RBC AUTO: 85 FL (ref 80–100)
MONOCYTES # BLD AUTO: 0.58 X10*3/UL (ref 0.1–1)
MONOCYTES NFR BLD AUTO: 10.5 %
NEUTROPHILS # BLD AUTO: 3.02 X10*3/UL (ref 1.2–7.7)
NEUTROPHILS NFR BLD AUTO: 54.7 %
NRBC BLD-RTO: 0 /100 WBCS (ref 0–0)
PLATELET # BLD AUTO: 207 X10*3/UL (ref 150–450)
POTASSIUM SERPL-SCNC: 4.3 MMOL/L (ref 3.5–5.3)
PROT SERPL-MCNC: 5.1 G/DL (ref 6.4–8.2)
RBC # BLD AUTO: 3.14 X10*6/UL (ref 4.5–5.9)
SODIUM SERPL-SCNC: 132 MMOL/L (ref 136–145)
WBC # BLD AUTO: 5.5 X10*3/UL (ref 4.4–11.3)

## 2025-01-31 PROCEDURE — 36415 COLL VENOUS BLD VENIPUNCTURE: CPT | Performed by: INTERNAL MEDICINE

## 2025-01-31 PROCEDURE — 51701 INSERT BLADDER CATHETER: CPT

## 2025-01-31 PROCEDURE — 82947 ASSAY GLUCOSE BLOOD QUANT: CPT

## 2025-01-31 PROCEDURE — 84075 ASSAY ALKALINE PHOSPHATASE: CPT | Performed by: INTERNAL MEDICINE

## 2025-01-31 PROCEDURE — 97110 THERAPEUTIC EXERCISES: CPT | Mod: GP

## 2025-01-31 PROCEDURE — 1200000002 HC GENERAL ROOM WITH TELEMETRY DAILY

## 2025-01-31 PROCEDURE — 2500000002 HC RX 250 W HCPCS SELF ADMINISTERED DRUGS (ALT 637 FOR MEDICARE OP, ALT 636 FOR OP/ED): Performed by: STUDENT IN AN ORGANIZED HEALTH CARE EDUCATION/TRAINING PROGRAM

## 2025-01-31 PROCEDURE — 85025 COMPLETE CBC W/AUTO DIFF WBC: CPT | Performed by: INTERNAL MEDICINE

## 2025-01-31 PROCEDURE — 97116 GAIT TRAINING THERAPY: CPT | Mod: GP

## 2025-01-31 PROCEDURE — 99231 SBSQ HOSP IP/OBS SF/LOW 25: CPT | Performed by: STUDENT IN AN ORGANIZED HEALTH CARE EDUCATION/TRAINING PROGRAM

## 2025-01-31 PROCEDURE — 2500000004 HC RX 250 GENERAL PHARMACY W/ HCPCS (ALT 636 FOR OP/ED)

## 2025-01-31 PROCEDURE — 2500000002 HC RX 250 W HCPCS SELF ADMINISTERED DRUGS (ALT 637 FOR MEDICARE OP, ALT 636 FOR OP/ED): Performed by: SURGERY

## 2025-01-31 PROCEDURE — 2500000002 HC RX 250 W HCPCS SELF ADMINISTERED DRUGS (ALT 637 FOR MEDICARE OP, ALT 636 FOR OP/ED): Performed by: INTERNAL MEDICINE

## 2025-01-31 PROCEDURE — 2500000001 HC RX 250 WO HCPCS SELF ADMINISTERED DRUGS (ALT 637 FOR MEDICARE OP): Performed by: INTERNAL MEDICINE

## 2025-01-31 PROCEDURE — 2500000004 HC RX 250 GENERAL PHARMACY W/ HCPCS (ALT 636 FOR OP/ED): Performed by: INTERNAL MEDICINE

## 2025-01-31 RX ADMIN — ATORVASTATIN CALCIUM 40 MG: 40 TABLET, FILM COATED ORAL at 20:10

## 2025-01-31 RX ADMIN — ASPIRIN 81 MG CHEWABLE TABLET 81 MG: 81 TABLET CHEWABLE at 08:56

## 2025-01-31 RX ADMIN — INSULIN LISPRO 6 UNITS: 100 INJECTION, SOLUTION INTRAVENOUS; SUBCUTANEOUS at 22:38

## 2025-01-31 RX ADMIN — PANTOPRAZOLE SODIUM 40 MG: 40 INJECTION, POWDER, FOR SOLUTION INTRAVENOUS at 20:10

## 2025-01-31 RX ADMIN — PANTOPRAZOLE SODIUM 40 MG: 40 INJECTION, POWDER, FOR SOLUTION INTRAVENOUS at 08:55

## 2025-01-31 RX ADMIN — METOCLOPRAMIDE HYDROCHLORIDE 10 MG: 5 INJECTION INTRAMUSCULAR; INTRAVENOUS at 17:11

## 2025-01-31 RX ADMIN — METOPROLOL TARTRATE 50 MG: 50 TABLET, FILM COATED ORAL at 08:55

## 2025-01-31 RX ADMIN — HEPARIN SODIUM 5000 UNITS: 5000 INJECTION, SOLUTION INTRAVENOUS; SUBCUTANEOUS at 13:02

## 2025-01-31 RX ADMIN — HEPARIN SODIUM 5000 UNITS: 5000 INJECTION, SOLUTION INTRAVENOUS; SUBCUTANEOUS at 05:45

## 2025-01-31 RX ADMIN — TAMSULOSIN HYDROCHLORIDE 0.4 MG: 0.4 CAPSULE ORAL at 08:56

## 2025-01-31 RX ADMIN — METOCLOPRAMIDE HYDROCHLORIDE 10 MG: 5 INJECTION INTRAMUSCULAR; INTRAVENOUS at 13:02

## 2025-01-31 RX ADMIN — METOCLOPRAMIDE HYDROCHLORIDE 10 MG: 5 INJECTION INTRAMUSCULAR; INTRAVENOUS at 05:45

## 2025-01-31 RX ADMIN — INSULIN LISPRO 6 UNITS: 100 INJECTION, SOLUTION INTRAVENOUS; SUBCUTANEOUS at 04:15

## 2025-01-31 RX ADMIN — INSULIN LISPRO 3 UNITS: 100 INJECTION, SOLUTION INTRAVENOUS; SUBCUTANEOUS at 17:58

## 2025-01-31 RX ADMIN — METOPROLOL TARTRATE 50 MG: 50 TABLET, FILM COATED ORAL at 20:10

## 2025-01-31 RX ADMIN — INSULIN LISPRO 8 UNITS: 100 INJECTION, SOLUTION INTRAVENOUS; SUBCUTANEOUS at 08:56

## 2025-01-31 RX ADMIN — INSULIN LISPRO 3 UNITS: 100 INJECTION, SOLUTION INTRAVENOUS; SUBCUTANEOUS at 13:59

## 2025-01-31 RX ADMIN — HEPARIN SODIUM 5000 UNITS: 5000 INJECTION, SOLUTION INTRAVENOUS; SUBCUTANEOUS at 21:47

## 2025-01-31 ASSESSMENT — PAIN SCALES - GENERAL
PAINLEVEL_OUTOF10: 5 - MODERATE PAIN
PAINLEVEL_OUTOF10: 0 - NO PAIN

## 2025-01-31 ASSESSMENT — COGNITIVE AND FUNCTIONAL STATUS - GENERAL
STANDING UP FROM CHAIR USING ARMS: A LITTLE
CLIMB 3 TO 5 STEPS WITH RAILING: A LOT
MOVING TO AND FROM BED TO CHAIR: A LITTLE
WALKING IN HOSPITAL ROOM: A LITTLE
MOVING FROM LYING ON BACK TO SITTING ON SIDE OF FLAT BED WITH BEDRAILS: A LITTLE
MOBILITY SCORE: 17
TURNING FROM BACK TO SIDE WHILE IN FLAT BAD: A LITTLE

## 2025-01-31 ASSESSMENT — PAIN - FUNCTIONAL ASSESSMENT
PAIN_FUNCTIONAL_ASSESSMENT: 0-10

## 2025-01-31 NOTE — NURSING NOTE
"Inpatient Diabetes Education chart review:    Pt is a 65 y/o male came to Ed via EMS on 1/21/25 at 11:52 with c/o \"hyperglycemia, nausea & dry heaves\"  BS in ED was 348 mg/dl.  Found to be dehydrated, pancolitis, HTN without dx of hypertension, urinary bladder distention.      H/o T2DM-insulin.    On 1/29/25 anion gap increased to 25, DKA.    Prior to Admission was taking Novolog SSI and NPH 20 units q day.    While inpatient getting Lispro 8 units with dinner                                        Lispro 8 units with breakfast  Lispro SSI 0-15 units nits every 4 hours.    POCT glucose today 1/31/25 at 13:59 was 184 mg/dl.    HbA1c 10.2% dated 1/21/25.    Per medical records patient will be going to SNF after discharge.     Will continue to monitor POCT glcuose  "

## 2025-01-31 NOTE — CARE PLAN
Problem: Diabetes  Goal: Achieve decreasing blood glucose levels by end of shift  Outcome: Progressing  Goal: Increase stability of blood glucose readings by end of shift  Outcome: Progressing  Goal: Decrease in ketones present in urine by end of shift  Outcome: Progressing  Goal: Maintain electrolyte levels within acceptable range throughout shift  Outcome: Progressing  Goal: Maintain glucose levels >70mg/dl to <250mg/dl throughout shift  Outcome: Progressing  Goal: No changes in neurological exam by end of shift  Outcome: Progressing  Goal: Learn about and adhere to nutrition recommendations by end of shift  Outcome: Progressing  Goal: Vital signs within normal range for age by end of shift  Outcome: Progressing  Goal: Increase self care and/or family involovement by end of shift  Outcome: Progressing  Goal: Receive DSME education by end of shift  Outcome: Progressing     Problem: Pain - Adult  Goal: Verbalizes/displays adequate comfort level or baseline comfort level  Outcome: Progressing     Problem: Safety - Adult  Goal: Free from fall injury  Outcome: Progressing     Problem: Discharge Planning  Goal: Discharge to home or other facility with appropriate resources  Outcome: Progressing     Problem: Chronic Conditions and Co-morbidities  Goal: Patient's chronic conditions and co-morbidity symptoms are monitored and maintained or improved  Outcome: Progressing     Problem: Fall/Injury  Goal: Not fall by end of shift  Outcome: Progressing  Goal: Be free from injury by end of the shift  Outcome: Progressing  Goal: Verbalize understanding of personal risk factors for fall in the hospital  Outcome: Progressing  Goal: Verbalize understanding of risk factor reduction measures to prevent injury from fall in the home  Outcome: Progressing  Goal: Use assistive devices by end of the shift  Outcome: Progressing  Goal: Pace activities to prevent fatigue by end of the shift  Outcome: Progressing     Problem: Pain  Goal:  Takes deep breaths with improved pain control throughout the shift  Outcome: Progressing  Goal: Turns in bed with improved pain control throughout the shift  Outcome: Progressing  Goal: Walks with improved pain control throughout the shift  Outcome: Progressing  Goal: Performs ADL's with improved pain control throughout shift  Outcome: Progressing  Goal: Participates in PT with improved pain control throughout the shift  Outcome: Progressing  Goal: Free from opioid side effects throughout the shift  Outcome: Progressing  Goal: Free from acute confusion related to pain meds throughout the shift  Outcome: Progressing   The patient's goals for the shift include      The clinical goals for the shift include safety and comfort

## 2025-01-31 NOTE — PROGRESS NOTES
"Hospitalist Progress Note  Devin Gomez is a 64 y.o. male who presented with Hyperglycemia (Pt bib ems for hyperglycemia, felt nauseous but no vomiting, denies sob, bs 300 on arrival) and is on day 10 of admission for Dehydration    Subjective    Reports concern of not having his phone and having difficulty getting ahold of family. Requests social work help in getting ahold of family       Objective    Blood pressure 114/57, pulse 72, temperature 35.1 °C (95.2 °F), temperature source Temporal, resp. rate 12, height 1.753 m (5' 9\"), weight 63.1 kg (139 lb 3.2 oz), SpO2 96%.  Vitals Reviewed: yes  Constitutional: sitting back in bed with glasses on  Eyes: EOMI, normal conjunctiva  HENT: moist mucus membranes, airway patent  Pulm: no wheezes, no rhonchi, no crackles, no coarse breath sounds  Cardiac: reg rate, regular rhythm, no murmur, equal pulses  GI: Soft, non tender, non-distended, normal bowel sounds  MSK: no lower extremity edema  Neuro: No focal deficit, oriented, CN II-XII grossly intact  Psych: Cooperative, blunted affect, unclear judgement      Intake/Output Summary (Last 24 hours) at 1/31/2025 1410  Last data filed at 1/31/2025 0939  Gross per 24 hour   Intake 360 ml   Output 1175 ml   Net -815 ml       Relevant Results  EEG  Result Date: 1/30/2025  IMPRESSION This routine EEG is normal. No epileptiform discharges or lateralizing signs are seen. A full report will be scanned into the patient's chart at a later time. This report has been interpreted and electronically signed by    MR brain wo IV contrast  Result Date: 1/30/2025  There are MRI findings compatible with a small 7 mm focus of acute to early subacute infarction within the left parietal lobe.   There is moderate brain parenchymal volume loss.   There are scattered as well as more patchy nonspecific white matter changes within the cerebral hemispheres bilaterally as well as abnormal bright signal on the FLAIR and T2 weighted images overlying the " brainstem which while nonspecific, given the patient's age, likely represent sequelae of more remote small-vessel ischemic change. Additional small foci of bright signal on the T2 weighted images are identified within the subinsular regions, basal ganglia, and thalami bilaterally suggesting small scattered more remote lacunar infarctions as well as incidental mildly prominent perivascular spaces.        Scheduled medications  aspirin, 81 mg, oral, Daily  atorvastatin, 40 mg, oral, Nightly  heparin, 5,000 Units, subcutaneous, q8h  insulin lispro, 0-15 Units, subcutaneous, q4h  insulin lispro, 8 Units, subcutaneous, Daily with breakfast  insulin lispro, 8 Units, subcutaneous, Daily with evening meal  metoclopramide, 10 mg, intravenous, q6h EUFEMIA  metoprolol tartrate, 50 mg, oral, BID  pantoprazole, 40 mg, intravenous, BID  polyethylene glycol, 17 g, oral, TID  tamsulosin, 0.4 mg, oral, Daily before breakfast      Continuous medications       PRN medications  PRN medications: acetaminophen **OR** acetaminophen **OR** acetaminophen, dextrose, dextrose, dextrose, glucagon, glucagon, heparin (porcine), hydrALAZINE, melatonin, ondansetron ODT **OR** ondansetron, oxyCODONE-acetaminophen        Assessment    Assessment & Plan  Dehydration  -resolved  Syncope and collapse  -unclear syncope vs seizure activity   -CT without acute change, MRI showing small vessel ischemic changes and 7mm subcortical parietal infarct that is either acute or subacute  -neurology consulted and signed off, advised improved BG control, LDL at goal, continue aspirin  -EEG without epileptiform discharges  NSTEMI (non-ST elevated myocardial infarction) (Multi)  -cardiology evaluated, troponins downtrended  -no further cardiac work-up indicated per 01/30 note  Diabetic ketoacidosis without coma associated with type 2 diabetes mellitus (Multi)  -continue SSI  Pancolitis (Multi)  -improving  Vomiting  -resolved  Bladder retention  -resolved  Ambulatory  dysfunction  -PT/OT      DVT prophylaxis: Heparin SubQ  Disposition: Plan for SNF at DC        I spent 30 minutes in the professional and overall care of this patient.    Jenn Braun

## 2025-01-31 NOTE — PROGRESS NOTES
Physical Therapy    Physical Therapy Treatment    Patient Name: Devin Gomez  MRN: [id number]  Department: [facility] 4 ICU  Room: [address] [place], [place] [address]  Today's Date: 1/31/2025  Time Calculation  Start Time: 1348  Stop Time: 1415  Time Calculation (min): 27 min         Assessment/Plan   PT Assessment  PT Assessment Results: Decreased strength, Decreased endurance, Impaired balance, Decreased mobility, Decreased coordination, Decreased safety awareness, Pain  Rehab Prognosis: Fair  Barriers to Discharge Home: Physical needs, Caregiver assistance  Caregiver Assistance: Patient lives alone and/or does not have reliable caregiver assistance  Physical Needs: Intermittent mobility assistance needed, High falls risk due to function or environment, Other (Comment)  Evaluation/Treatment Tolerance: Patient tolerated treatment well  Medical Staff Made Aware: Yes  Strengths: Premorbid level of function  Barriers to Participation: Comorbidities  End of Session Communication: Bedside nurse  Assessment Comment: Improved mobility w/ less assist, increased amb distance. Overall reports fatigue today. Gives effort as able w/ moderate Lt hip pain on activity.  End of Session Patient Position: Bed, 3 rail up, Alarm on  PT Plan  Inpatient/Swing Bed or Outpatient: Inpatient  PT Plan  Treatment/Interventions: Bed mobility, Transfer training, Gait training, Balance training, Strengthening, Endurance training, Therapeutic exercise, Therapeutic activity  PT Plan: Ongoing PT  PT Frequency: 3 times per week  PT Discharge Recommendations: Moderate intensity level of continued care  Equipment Recommended upon Discharge: Wheeled walker  PT Recommended Transfer Status: Assist x1, Assistive device  PT - OK to Discharge: Yes      General Visit Information:   PT  Visit  PT Received On: 01/31/25  General  Reason for Referral: impaired mobility, dehydration  Referred By: Dr. Knowles  Past Medical History Relevant to Rehab: DM with neuropathy, PVD, HTN,  hyperlipidemia  Family/Caregiver Present: No  Prior to Session Communication: Bedside nurse  Patient Position Received: Bed, 3 rail up, Alarm on  Preferred Learning Style: verbal, visual  General Comment: Pt reports fatigue, did not sleep well last night, agreeable to PT session    Subjective   Precautions:  Precautions  Hearing/Visual Limitations: + glasses  Medical Precautions: Fall precautions     Date/Time Vitals Session Patient Position Pulse Resp SpO2 BP MAP (mmHg)    01/31/25 1348 During PT  Sitting  68  --  98 %  --  --                 Objective   Pain:  Pain Assessment  Pain Assessment: 0-10  0-10 (Numeric) Pain Score: 5 - Moderate pain (0-rest, increased w/ activity)  Pain Type: Chronic pain  Pain Location: Hip  Pain Orientation: Left  Pain Interventions: Ambulation/increased activity  Response to Interventions: Decrease in pain, Other (Comment) (increased w/ Lt hip exrcises)  Cognition:  Cognition  Overall Cognitive Status: Within Functional Limits (intermittent mild confusion)  Cognition Comments: pleasant, cooperative  Insight: Mild  Impulsive: Mildly  Coordination:  Coordination Comment: decreased w/ physical deficits  Postural Control:  Dynamic Standing Balance  Dynamic Standing-Balance Support: Bilateral upper extremity supported  Dynamic Standing-Level of Assistance: Contact guard  Dynamic Standing-Balance: Turning (amb)  Dynamic Standing-Comments: Fair/Fair-    Activity Tolerance:  Activity Tolerance  Endurance: Tolerates less than 10 min exercise, no significant change in vital signs  Activity Tolerance Comments: Fair  Treatments:  Therapeutic Exercise  Therapeutic Exercise Activity 1: pt performed seatd bilat ankle pumps (limited Lt), LAQs, hip flexion, glute sets, debra hip add and abduction x 10-20 reps depending on Lt hip tolerance. Pt gives effort as able.    Bed Mobility 1  Bed Mobility 1: Supine to sitting, Sitting to supine  Level of Assistance 1: Close supervision  Bed Mobility Comments 1:  in/out Rt side of bed, HOB elevated part way, little effort    Ambulation/Gait Training 1  Surface 1: Level tile  Device 1: Standard walker  Assistance 1: Contact guard  Comments/Distance (ft) 1: Pt amb ~ 20' x 1, 40' x 1. forward flexed posture w/ increased UE support on walker, high steppage gait w/ initial contact through toes/ball of foot w/ weight kept shifted anteriorly to avoid heel contact as pt does not want to chance ulcers. Occasionally choppy w/ SW advancement. No actual LOB.  Transfer 1  Technique 1: Sit to stand, Stand to sit  Transfer Device 1: Walker  Transfer Level of Assistance 1: Contact guard, Minimum assistance  Trials/Comments 1: From/to EOB, to/from chair w/ arms x 2 trials, cues for safe hand placement. No posterior LOB this session. Manages fairly from lower surface.    Outcome Measures:  [facility] Basic Mobility  Turning from your back to your side while in a flat bed without using bedrails: A little  Moving from lying on your back to sitting on the side of a flat bed without using bedrails: A little  Moving to and from bed to chair (including a wheelchair): A little  Standing up from a chair using your arms (e.g. wheelchair or bedside chair): A little  To walk in hospital room: A little  Climbing 3-5 steps with railing: A lot  Basic Mobility - Total Score: 17    Education Documentation  Precautions, taught by Carla Beck PT at 1/31/2025  2:40 PM.  Learner: Patient  Readiness: Acceptance  Method: Explanation, Demonstration  Response: Verbalizes Understanding, Needs Reinforcement  Comment: safety and technique    Home Exercise Program, taught by Carla Beck PT at 1/31/2025  2:40 PM.  Learner: Patient  Readiness: Acceptance  Method: Explanation, Demonstration  Response: Verbalizes Understanding, Needs Reinforcement  Comment: safety and technique    Mobility Training, taught by Carla Beck PT at 1/31/2025  2:40 PM.  Learner: Patient  Readiness: Acceptance  Method: Explanation,  Demonstration  Response: Verbalizes Understanding, Needs Reinforcement  Comment: safety and technique    Education Comments  No comments found.        OP EDUCATION:       Encounter Problems       Encounter Problems (Active)       Balance       dynamic (Progressing)       Start:  01/22/25    Expected End:  02/05/25       Pt will perform Tinetti assessment and score >/= 24/28, resulting in a low falls risk             Mobility       LTG - Patient will be able to go up and down a curb/step with the appropriate device (Progressing)       Start:  01/22/25    Expected End:  02/05/25            LTG - Patient will navigate 4-6 steps with rails/device (Progressing)       Start:  01/22/25    Expected End:  02/05/25            ambulation (Progressing)       Start:  01/22/25    Expected End:  02/05/25       Pt will amb > 100 ft with wheeled walker/LRAD and mod independence          endurance (Progressing)       Start:  01/22/25    Expected End:  02/05/25       Pt will tolerate > 20 minutes of activity with stable vital signs and decreased c/o SOB/nausea.            PT Transfers       sit to stand (Progressing)       Start:  01/22/25    Expected End:  02/05/25       Pt will perform sit to stand transfer with LRAD and mod independence.             Pain - Adult          Safety       LTG - Patient will utilize safety techniques (Progressing)       Start:  01/22/25    Expected End:  02/05/25

## 2025-01-31 NOTE — ASSESSMENT & PLAN NOTE
-unclear syncope vs seizure activity   -CT without acute change, MRI showing small vessel ischemic changes and 7mm subcortical parietal infarct that is either acute or subacute  -neurology consulted and signed off, advised improved BG control, LDL at goal, continue aspirin  -EEG without epileptiform discharges

## 2025-01-31 NOTE — PROGRESS NOTES
01/31/25 0857   Discharge Planning   Home or Post Acute Services Post acute facilities (Rehab/SNF/etc)   Type of Post Acute Facility Services Skilled nursing  (Accepted by [facility].  Requested facility to initiate precert at this time.  Awaiting updates.)   Expected Discharge Disposition SNF   Does the patient need discharge transport arranged? Yes   RoundTrip coordination needed? Yes   Has discharge transport been arranged? No     0125  At the request of patient, reached out to his daughter Nia [phone].

## 2025-01-31 NOTE — CARE PLAN
The patient's goals for the shift include  n/a    The clinical goals for the shift include safety      Problem: Diabetes  Goal: Achieve decreasing blood glucose levels by end of shift  Outcome: Progressing  Goal: Increase stability of blood glucose readings by end of shift  Outcome: Progressing  Goal: Decrease in ketones present in urine by end of shift  Outcome: Progressing  Goal: Maintain electrolyte levels within acceptable range throughout shift  Outcome: Progressing  Goal: Maintain glucose levels >70mg/dl to <250mg/dl throughout shift  Outcome: Progressing  Goal: No changes in neurological exam by end of shift  Outcome: Progressing  Goal: Learn about and adhere to nutrition recommendations by end of shift  Outcome: Progressing  Goal: Vital signs within normal range for age by end of shift  Outcome: Progressing  Goal: Increase self care and/or family involovement by end of shift  Outcome: Progressing  Goal: Receive DSME education by end of shift  Outcome: Progressing     Problem: Pain - Adult  Goal: Verbalizes/displays adequate comfort level or baseline comfort level  Outcome: Progressing     Problem: Safety - Adult  Goal: Free from fall injury  Outcome: Progressing     Problem: Discharge Planning  Goal: Discharge to home or other facility with appropriate resources  Outcome: Progressing     Problem: Chronic Conditions and Co-morbidities  Goal: Patient's chronic conditions and co-morbidity symptoms are monitored and maintained or improved  Outcome: Progressing     Problem: Fall/Injury  Goal: Not fall by end of shift  Outcome: Progressing  Goal: Be free from injury by end of the shift  Outcome: Progressing  Goal: Verbalize understanding of personal risk factors for fall in the hospital  Outcome: Progressing  Goal: Verbalize understanding of risk factor reduction measures to prevent injury from fall in the home  Outcome: Progressing  Goal: Use assistive devices by end of the shift  Outcome: Progressing  Goal: Pace  activities to prevent fatigue by end of the shift  Outcome: Progressing     Problem: Pain  Goal: Takes deep breaths with improved pain control throughout the shift  Outcome: Progressing  Goal: Turns in bed with improved pain control throughout the shift  Outcome: Progressing  Goal: Walks with improved pain control throughout the shift  Outcome: Progressing  Goal: Performs ADL's with improved pain control throughout shift  Outcome: Progressing  Goal: Participates in PT with improved pain control throughout the shift  Outcome: Progressing  Goal: Free from opioid side effects throughout the shift  Outcome: Progressing  Goal: Free from acute confusion related to pain meds throughout the shift  Outcome: Progressing

## 2025-02-01 LAB
ALBUMIN SERPL BCP-MCNC: 2.9 G/DL (ref 3.4–5)
ALP SERPL-CCNC: 52 U/L (ref 33–136)
ALT SERPL W P-5'-P-CCNC: 24 U/L (ref 10–52)
ANION GAP SERPL CALCULATED.3IONS-SCNC: 9 MMOL/L (ref 10–20)
AST SERPL W P-5'-P-CCNC: 17 U/L (ref 9–39)
BASOPHILS # BLD AUTO: 0.07 X10*3/UL (ref 0–0.1)
BASOPHILS NFR BLD AUTO: 1.3 %
BILIRUB SERPL-MCNC: 0.5 MG/DL (ref 0–1.2)
BUN SERPL-MCNC: 24 MG/DL (ref 6–23)
CALCIUM SERPL-MCNC: 7.6 MG/DL (ref 8.6–10.3)
CHLORIDE SERPL-SCNC: 105 MMOL/L (ref 98–107)
CO2 SERPL-SCNC: 24 MMOL/L (ref 21–32)
CREAT SERPL-MCNC: 1.19 MG/DL (ref 0.5–1.3)
EGFRCR SERPLBLD CKD-EPI 2021: 67 ML/MIN/1.73M*2
EOSINOPHIL # BLD AUTO: 0.07 X10*3/UL (ref 0–0.7)
EOSINOPHIL NFR BLD AUTO: 1.3 %
ERYTHROCYTE [DISTWIDTH] IN BLOOD BY AUTOMATED COUNT: 13.4 % (ref 11.5–14.5)
GLUCOSE BLD MANUAL STRIP-MCNC: 144 MG/DL (ref 74–99)
GLUCOSE BLD MANUAL STRIP-MCNC: 153 MG/DL (ref 74–99)
GLUCOSE BLD MANUAL STRIP-MCNC: 170 MG/DL (ref 74–99)
GLUCOSE BLD MANUAL STRIP-MCNC: 183 MG/DL (ref 74–99)
GLUCOSE BLD MANUAL STRIP-MCNC: 258 MG/DL (ref 74–99)
GLUCOSE BLD MANUAL STRIP-MCNC: 262 MG/DL (ref 74–99)
GLUCOSE BLD MANUAL STRIP-MCNC: 80 MG/DL (ref 74–99)
GLUCOSE SERPL-MCNC: 155 MG/DL (ref 74–99)
HCT VFR BLD AUTO: 25.3 % (ref 41–52)
HGB BLD-MCNC: 8.5 G/DL (ref 13.5–17.5)
IMM GRANULOCYTES # BLD AUTO: 0.02 X10*3/UL (ref 0–0.7)
IMM GRANULOCYTES NFR BLD AUTO: 0.4 % (ref 0–0.9)
LYMPHOCYTES # BLD AUTO: 2.2 X10*3/UL (ref 1.2–4.8)
LYMPHOCYTES NFR BLD AUTO: 42.2 %
MCH RBC QN AUTO: 28.7 PG (ref 26–34)
MCHC RBC AUTO-ENTMCNC: 33.6 G/DL (ref 32–36)
MCV RBC AUTO: 86 FL (ref 80–100)
MONOCYTES # BLD AUTO: 0.57 X10*3/UL (ref 0.1–1)
MONOCYTES NFR BLD AUTO: 10.9 %
NEUTROPHILS # BLD AUTO: 2.28 X10*3/UL (ref 1.2–7.7)
NEUTROPHILS NFR BLD AUTO: 43.9 %
NRBC BLD-RTO: 0 /100 WBCS (ref 0–0)
PLATELET # BLD AUTO: 191 X10*3/UL (ref 150–450)
POTASSIUM SERPL-SCNC: 3.3 MMOL/L (ref 3.5–5.3)
PROT SERPL-MCNC: 4.5 G/DL (ref 6.4–8.2)
RBC # BLD AUTO: 2.96 X10*6/UL (ref 4.5–5.9)
SODIUM SERPL-SCNC: 135 MMOL/L (ref 136–145)
WBC # BLD AUTO: 5.2 X10*3/UL (ref 4.4–11.3)

## 2025-02-01 PROCEDURE — 36415 COLL VENOUS BLD VENIPUNCTURE: CPT | Performed by: INTERNAL MEDICINE

## 2025-02-01 PROCEDURE — 2500000002 HC RX 250 W HCPCS SELF ADMINISTERED DRUGS (ALT 637 FOR MEDICARE OP, ALT 636 FOR OP/ED): Performed by: SURGERY

## 2025-02-01 PROCEDURE — 2500000001 HC RX 250 WO HCPCS SELF ADMINISTERED DRUGS (ALT 637 FOR MEDICARE OP): Performed by: INTERNAL MEDICINE

## 2025-02-01 PROCEDURE — 2500000002 HC RX 250 W HCPCS SELF ADMINISTERED DRUGS (ALT 637 FOR MEDICARE OP, ALT 636 FOR OP/ED): Performed by: STUDENT IN AN ORGANIZED HEALTH CARE EDUCATION/TRAINING PROGRAM

## 2025-02-01 PROCEDURE — 2500000004 HC RX 250 GENERAL PHARMACY W/ HCPCS (ALT 636 FOR OP/ED): Performed by: INTERNAL MEDICINE

## 2025-02-01 PROCEDURE — 2500000004 HC RX 250 GENERAL PHARMACY W/ HCPCS (ALT 636 FOR OP/ED)

## 2025-02-01 PROCEDURE — 1200000002 HC GENERAL ROOM WITH TELEMETRY DAILY

## 2025-02-01 PROCEDURE — 2500000001 HC RX 250 WO HCPCS SELF ADMINISTERED DRUGS (ALT 637 FOR MEDICARE OP): Performed by: STUDENT IN AN ORGANIZED HEALTH CARE EDUCATION/TRAINING PROGRAM

## 2025-02-01 PROCEDURE — 82947 ASSAY GLUCOSE BLOOD QUANT: CPT

## 2025-02-01 PROCEDURE — 2500000002 HC RX 250 W HCPCS SELF ADMINISTERED DRUGS (ALT 637 FOR MEDICARE OP, ALT 636 FOR OP/ED): Performed by: INTERNAL MEDICINE

## 2025-02-01 PROCEDURE — 51701 INSERT BLADDER CATHETER: CPT

## 2025-02-01 PROCEDURE — 85025 COMPLETE CBC W/AUTO DIFF WBC: CPT | Performed by: INTERNAL MEDICINE

## 2025-02-01 PROCEDURE — 84075 ASSAY ALKALINE PHOSPHATASE: CPT | Performed by: INTERNAL MEDICINE

## 2025-02-01 PROCEDURE — 99231 SBSQ HOSP IP/OBS SF/LOW 25: CPT | Performed by: STUDENT IN AN ORGANIZED HEALTH CARE EDUCATION/TRAINING PROGRAM

## 2025-02-01 RX ORDER — POLYETHYLENE GLYCOL 3350 17 G/17G
17 POWDER, FOR SOLUTION ORAL 3 TIMES DAILY PRN
Status: DISCONTINUED | OUTPATIENT
Start: 2025-02-01 | End: 2025-02-05 | Stop reason: HOSPADM

## 2025-02-01 RX ORDER — INSULIN LISPRO 100 [IU]/ML
10 INJECTION, SOLUTION INTRAVENOUS; SUBCUTANEOUS
Status: DISCONTINUED | OUTPATIENT
Start: 2025-02-02 | End: 2025-02-05 | Stop reason: HOSPADM

## 2025-02-01 RX ORDER — DOCUSATE SODIUM 100 MG/1
100 CAPSULE, LIQUID FILLED ORAL 3 TIMES DAILY
Status: DISCONTINUED | OUTPATIENT
Start: 2025-02-01 | End: 2025-02-05 | Stop reason: HOSPADM

## 2025-02-01 RX ORDER — INSULIN LISPRO 100 [IU]/ML
10 INJECTION, SOLUTION INTRAVENOUS; SUBCUTANEOUS
Status: DISCONTINUED | OUTPATIENT
Start: 2025-02-01 | End: 2025-02-05 | Stop reason: HOSPADM

## 2025-02-01 RX ADMIN — HEPARIN SODIUM 5000 UNITS: 5000 INJECTION, SOLUTION INTRAVENOUS; SUBCUTANEOUS at 21:14

## 2025-02-01 RX ADMIN — METOCLOPRAMIDE HYDROCHLORIDE 10 MG: 5 INJECTION INTRAMUSCULAR; INTRAVENOUS at 06:27

## 2025-02-01 RX ADMIN — INSULIN LISPRO 3 UNITS: 100 INJECTION, SOLUTION INTRAVENOUS; SUBCUTANEOUS at 16:23

## 2025-02-01 RX ADMIN — INSULIN LISPRO 9 UNITS: 100 INJECTION, SOLUTION INTRAVENOUS; SUBCUTANEOUS at 11:53

## 2025-02-01 RX ADMIN — ASPIRIN 81 MG CHEWABLE TABLET 81 MG: 81 TABLET CHEWABLE at 09:04

## 2025-02-01 RX ADMIN — ATORVASTATIN CALCIUM 40 MG: 40 TABLET, FILM COATED ORAL at 21:14

## 2025-02-01 RX ADMIN — METOCLOPRAMIDE HYDROCHLORIDE 10 MG: 5 INJECTION INTRAMUSCULAR; INTRAVENOUS at 18:27

## 2025-02-01 RX ADMIN — PANTOPRAZOLE SODIUM 40 MG: 40 INJECTION, POWDER, FOR SOLUTION INTRAVENOUS at 21:14

## 2025-02-01 RX ADMIN — TAMSULOSIN HYDROCHLORIDE 0.4 MG: 0.4 CAPSULE ORAL at 06:27

## 2025-02-01 RX ADMIN — PANTOPRAZOLE SODIUM 40 MG: 40 INJECTION, POWDER, FOR SOLUTION INTRAVENOUS at 09:04

## 2025-02-01 RX ADMIN — INSULIN LISPRO 8 UNITS: 100 INJECTION, SOLUTION INTRAVENOUS; SUBCUTANEOUS at 09:00

## 2025-02-01 RX ADMIN — HEPARIN SODIUM 5000 UNITS: 5000 INJECTION, SOLUTION INTRAVENOUS; SUBCUTANEOUS at 14:39

## 2025-02-01 RX ADMIN — METOCLOPRAMIDE HYDROCHLORIDE 10 MG: 5 INJECTION INTRAMUSCULAR; INTRAVENOUS at 12:21

## 2025-02-01 RX ADMIN — DOCUSATE SODIUM 100 MG: 100 CAPSULE, LIQUID FILLED ORAL at 15:44

## 2025-02-01 RX ADMIN — DOCUSATE SODIUM 100 MG: 100 CAPSULE, LIQUID FILLED ORAL at 21:14

## 2025-02-01 RX ADMIN — METOCLOPRAMIDE HYDROCHLORIDE 10 MG: 5 INJECTION INTRAMUSCULAR; INTRAVENOUS at 00:50

## 2025-02-01 RX ADMIN — HEPARIN SODIUM 5000 UNITS: 5000 INJECTION, SOLUTION INTRAVENOUS; SUBCUTANEOUS at 06:27

## 2025-02-01 RX ADMIN — INSULIN LISPRO 9 UNITS: 100 INJECTION, SOLUTION INTRAVENOUS; SUBCUTANEOUS at 02:27

## 2025-02-01 RX ADMIN — METOPROLOL TARTRATE 50 MG: 50 TABLET, FILM COATED ORAL at 21:14

## 2025-02-01 RX ADMIN — METOPROLOL TARTRATE 50 MG: 50 TABLET, FILM COATED ORAL at 09:04

## 2025-02-01 ASSESSMENT — PAIN SCALES - GENERAL
PAINLEVEL_OUTOF10: 0 - NO PAIN
PAINLEVEL_OUTOF10: 0 - NO PAIN

## 2025-02-01 ASSESSMENT — PAIN - FUNCTIONAL ASSESSMENT: PAIN_FUNCTIONAL_ASSESSMENT: 0-10

## 2025-02-01 NOTE — CARE PLAN
The patient's goals for the shift include  rest    The clinical goals for the shift include monitor blood glucose

## 2025-02-01 NOTE — CARE PLAN
Problem: Diabetes  Goal: Achieve decreasing blood glucose levels by end of shift  Outcome: Progressing     Problem: Diabetes  Goal: Increase stability of blood glucose readings by end of shift  Outcome: Progressing     Problem: Diabetes  Goal: Maintain glucose levels >70mg/dl to <250mg/dl throughout shift  Outcome: Progressing     Problem: Safety - Adult  Goal: Free from fall injury  Outcome: Progressing       The clinical goals for the shift include safety

## 2025-02-01 NOTE — PROGRESS NOTES
"Hospitalist Progress Note  Devin Gomez is a 64 y.o. male who presented with Hyperglycemia (Pt bib ems for hyperglycemia, felt nauseous but no vomiting, denies sob, bs 300 on arrival) and is on day 11 of admission for Dehydration    Subjective    Reports his daughter is coming to the hospital and bringing his phone today       Objective    Blood pressure 154/71, pulse 69, temperature 36.5 °C (97.7 °F), temperature source Temporal, resp. rate 11, height 1.753 m (5' 9\"), weight 62.9 kg (138 lb 9.6 oz), SpO2 97%.  Vitals Reviewed: yes  Constitutional: sitting back in bed no acute distress  Eyes: EOMI, normal conjunctiva  HENT: moist mucus membranes, airway patent  Pulm: no wheezes, no rhonchi, no crackles, no coarse breath sounds  Cardiac: reg rate, regular rhythm, no murmur, equal pulses  GI: Soft, non tender, non-distended, normal bowel sounds  MSK: no lower extremity edema  Neuro: No focal deficit, oriented, CN II-XII grossly intact  Psych: Cooperative, blunted affect, unclear judgement      Intake/Output Summary (Last 24 hours) at 2/1/2025 1148  Last data filed at 2/1/2025 0700  Gross per 24 hour   Intake 540 ml   Output 1160 ml   Net -620 ml       Relevant Results  Scheduled medications  aspirin, 81 mg, oral, Daily  atorvastatin, 40 mg, oral, Nightly  heparin, 5,000 Units, subcutaneous, q8h  insulin lispro, 0-15 Units, subcutaneous, q4h  insulin lispro, 8 Units, subcutaneous, Daily with breakfast  insulin lispro, 8 Units, subcutaneous, Daily with evening meal  metoclopramide, 10 mg, intravenous, q6h EUFEMIA  metoprolol tartrate, 50 mg, oral, BID  pantoprazole, 40 mg, intravenous, BID  polyethylene glycol, 17 g, oral, TID  tamsulosin, 0.4 mg, oral, Daily before breakfast      Continuous medications       PRN medications  PRN medications: acetaminophen **OR** acetaminophen **OR** acetaminophen, dextrose, dextrose, dextrose, glucagon, glucagon, heparin (porcine), hydrALAZINE, melatonin, ondansetron ODT **OR** " ondansetron, oxyCODONE-acetaminophen        Assessment    Assessment & Plan  Dehydration  -resolved  Syncope and collapse  -unclear syncope vs seizure activity   -CT without acute change, MRI showing small vessel ischemic changes and 7mm subcortical parietal infarct that is either acute or subacute  -neurology consulted and signed off, advised improved BG control, LDL at goal, continue aspirin  -EEG without epileptiform discharges  NSTEMI (non-ST elevated myocardial infarction) (Multi)  -cardiology evaluated, troponins downtrended  -no further cardiac work-up indicated per 01/30 note  Diabetic ketoacidosis without coma associated with type 2 diabetes mellitus (Multi)  -continue SSI  Pancolitis (Multi)  -improving  Ambulatory dysfunction  -PT/OT      DVT prophylaxis: Heparin SubQ  Disposition: Plan for SNF at DC        I spent 30 minutes in the professional and overall care of this patient.    Jenn Braun

## 2025-02-02 VITALS
WEIGHT: 138.89 LBS | DIASTOLIC BLOOD PRESSURE: 83 MMHG | TEMPERATURE: 98.6 F | HEART RATE: 68 BPM | OXYGEN SATURATION: 97 % | RESPIRATION RATE: 11 BRPM | SYSTOLIC BLOOD PRESSURE: 157 MMHG | HEIGHT: 69 IN | BODY MASS INDEX: 20.57 KG/M2

## 2025-02-02 DIAGNOSIS — L20.9 ATOPIC DERMATITIS, UNSPECIFIED TYPE: ICD-10-CM

## 2025-02-02 LAB
GLUCOSE BLD MANUAL STRIP-MCNC: 138 MG/DL (ref 74–99)
GLUCOSE BLD MANUAL STRIP-MCNC: 138 MG/DL (ref 74–99)
GLUCOSE BLD MANUAL STRIP-MCNC: 252 MG/DL (ref 74–99)
GLUCOSE BLD MANUAL STRIP-MCNC: 257 MG/DL (ref 74–99)
GLUCOSE BLD MANUAL STRIP-MCNC: 277 MG/DL (ref 74–99)
GLUCOSE BLD MANUAL STRIP-MCNC: 341 MG/DL (ref 74–99)

## 2025-02-02 PROCEDURE — 2500000004 HC RX 250 GENERAL PHARMACY W/ HCPCS (ALT 636 FOR OP/ED)

## 2025-02-02 PROCEDURE — 2500000004 HC RX 250 GENERAL PHARMACY W/ HCPCS (ALT 636 FOR OP/ED): Performed by: INTERNAL MEDICINE

## 2025-02-02 PROCEDURE — 2500000002 HC RX 250 W HCPCS SELF ADMINISTERED DRUGS (ALT 637 FOR MEDICARE OP, ALT 636 FOR OP/ED): Performed by: STUDENT IN AN ORGANIZED HEALTH CARE EDUCATION/TRAINING PROGRAM

## 2025-02-02 PROCEDURE — 2500000001 HC RX 250 WO HCPCS SELF ADMINISTERED DRUGS (ALT 637 FOR MEDICARE OP): Performed by: INTERNAL MEDICINE

## 2025-02-02 PROCEDURE — 82947 ASSAY GLUCOSE BLOOD QUANT: CPT

## 2025-02-02 PROCEDURE — 51701 INSERT BLADDER CATHETER: CPT

## 2025-02-02 PROCEDURE — 2500000001 HC RX 250 WO HCPCS SELF ADMINISTERED DRUGS (ALT 637 FOR MEDICARE OP): Performed by: STUDENT IN AN ORGANIZED HEALTH CARE EDUCATION/TRAINING PROGRAM

## 2025-02-02 PROCEDURE — 2500000002 HC RX 250 W HCPCS SELF ADMINISTERED DRUGS (ALT 637 FOR MEDICARE OP, ALT 636 FOR OP/ED): Performed by: INTERNAL MEDICINE

## 2025-02-02 PROCEDURE — 99231 SBSQ HOSP IP/OBS SF/LOW 25: CPT | Performed by: STUDENT IN AN ORGANIZED HEALTH CARE EDUCATION/TRAINING PROGRAM

## 2025-02-02 PROCEDURE — 2500000002 HC RX 250 W HCPCS SELF ADMINISTERED DRUGS (ALT 637 FOR MEDICARE OP, ALT 636 FOR OP/ED): Performed by: SURGERY

## 2025-02-02 PROCEDURE — 1100000001 HC PRIVATE ROOM DAILY

## 2025-02-02 RX ORDER — METOCLOPRAMIDE HYDROCHLORIDE 5 MG/ML
10 INJECTION INTRAMUSCULAR; INTRAVENOUS EVERY 6 HOURS PRN
Status: DISCONTINUED | OUTPATIENT
Start: 2025-02-02 | End: 2025-02-05 | Stop reason: HOSPADM

## 2025-02-02 RX ADMIN — PANTOPRAZOLE SODIUM 40 MG: 40 INJECTION, POWDER, FOR SOLUTION INTRAVENOUS at 21:16

## 2025-02-02 RX ADMIN — METOCLOPRAMIDE HYDROCHLORIDE 10 MG: 5 INJECTION INTRAMUSCULAR; INTRAVENOUS at 12:27

## 2025-02-02 RX ADMIN — TAMSULOSIN HYDROCHLORIDE 0.4 MG: 0.4 CAPSULE ORAL at 06:54

## 2025-02-02 RX ADMIN — INSULIN LISPRO 10 UNITS: 100 INJECTION, SOLUTION INTRAVENOUS; SUBCUTANEOUS at 17:00

## 2025-02-02 RX ADMIN — HEPARIN SODIUM 5000 UNITS: 5000 INJECTION, SOLUTION INTRAVENOUS; SUBCUTANEOUS at 21:16

## 2025-02-02 RX ADMIN — INSULIN LISPRO 9 UNITS: 100 INJECTION, SOLUTION INTRAVENOUS; SUBCUTANEOUS at 07:26

## 2025-02-02 RX ADMIN — DOCUSATE SODIUM 100 MG: 100 CAPSULE, LIQUID FILLED ORAL at 16:59

## 2025-02-02 RX ADMIN — PANTOPRAZOLE SODIUM 40 MG: 40 INJECTION, POWDER, FOR SOLUTION INTRAVENOUS at 08:48

## 2025-02-02 RX ADMIN — HEPARIN SODIUM 5000 UNITS: 5000 INJECTION, SOLUTION INTRAVENOUS; SUBCUTANEOUS at 17:00

## 2025-02-02 RX ADMIN — ASPIRIN 81 MG CHEWABLE TABLET 81 MG: 81 TABLET CHEWABLE at 08:48

## 2025-02-02 RX ADMIN — INSULIN LISPRO 12 UNITS: 100 INJECTION, SOLUTION INTRAVENOUS; SUBCUTANEOUS at 01:58

## 2025-02-02 RX ADMIN — METOPROLOL TARTRATE 50 MG: 50 TABLET, FILM COATED ORAL at 21:17

## 2025-02-02 RX ADMIN — METOCLOPRAMIDE HYDROCHLORIDE 10 MG: 5 INJECTION INTRAMUSCULAR; INTRAVENOUS at 01:37

## 2025-02-02 RX ADMIN — ATORVASTATIN CALCIUM 40 MG: 40 TABLET, FILM COATED ORAL at 21:17

## 2025-02-02 RX ADMIN — INSULIN LISPRO 9 UNITS: 100 INJECTION, SOLUTION INTRAVENOUS; SUBCUTANEOUS at 12:27

## 2025-02-02 RX ADMIN — DOCUSATE SODIUM 100 MG: 100 CAPSULE, LIQUID FILLED ORAL at 08:48

## 2025-02-02 RX ADMIN — HEPARIN SODIUM 5000 UNITS: 5000 INJECTION, SOLUTION INTRAVENOUS; SUBCUTANEOUS at 06:53

## 2025-02-02 RX ADMIN — METOPROLOL TARTRATE 50 MG: 50 TABLET, FILM COATED ORAL at 08:48

## 2025-02-02 RX ADMIN — INSULIN LISPRO 10 UNITS: 100 INJECTION, SOLUTION INTRAVENOUS; SUBCUTANEOUS at 08:49

## 2025-02-02 RX ADMIN — METOCLOPRAMIDE HYDROCHLORIDE 10 MG: 5 INJECTION INTRAMUSCULAR; INTRAVENOUS at 06:53

## 2025-02-02 ASSESSMENT — PAIN SCALES - GENERAL
PAINLEVEL_OUTOF10: 0 - NO PAIN
PAINLEVEL_OUTOF10: 0 - NO PAIN

## 2025-02-02 ASSESSMENT — PAIN - FUNCTIONAL ASSESSMENT: PAIN_FUNCTIONAL_ASSESSMENT: 0-10

## 2025-02-02 NOTE — PROGRESS NOTES
02/02/25 1254   Discharge Planning   Expected Discharge Disposition SNF   Does the patient need discharge transport arranged? Yes   RoundTrip coordination needed? Yes     Patient accepted at [facility].    Facility requested to initiate precert on 1/31 at 0927.       Update requested with no response.      Updates attached to referral and sent to facility for review.    Discharge plan NOT secure  DO NOT DC without speaking to care coordination

## 2025-02-02 NOTE — CARE PLAN
The clinical goals for the shift include monitor blood glucose      Problem: Diabetes  Goal: Increase stability of blood glucose readings by end of shift  Outcome: Progressing     Problem: Diabetes  Goal: Maintain electrolyte levels within acceptable range throughout shift  Outcome: Progressing     Problem: Diabetes  Goal: Maintain glucose levels >70mg/dl to <250mg/dl throughout shift  Outcome: Progressing     Problem: Diabetes  Goal: Vital signs within normal range for age by end of shift  Outcome: Progressing     Problem: Diabetes  Goal: No changes in neurological exam by end of shift  Outcome: Progressing

## 2025-02-02 NOTE — CARE PLAN
The patient's goals for the shift include      The clinical goals for the shift include monitor blood glucose/straight catheterisation PRN

## 2025-02-02 NOTE — PROGRESS NOTES
"Hospitalist Progress Note  Devin Gomez is a 64 y.o. male who presented with Hyperglycemia (Pt bib ems for hyperglycemia, felt nauseous but no vomiting, denies sob, bs 300 on arrival) and is on day 12 of admission for Dehydration    Subjective    He feels much better now that he has his phone. He has been working with physical therapy       Objective    Blood pressure 145/74, pulse 72, temperature 36.5 °C (97.7 °F), temperature source Temporal, resp. rate 11, height 1.753 m (5' 9\"), weight 63 kg (138 lb 14.2 oz), SpO2 97%.  Vitals Reviewed: yes  Constitutional: sitting back in bed no acute distress  Eyes: EOMI, normal conjunctiva  HENT: moist mucus membranes, airway patent  Pulm: no wheezes, no rhonchi, no crackles, no coarse breath sounds  Cardiac: reg rate, regular rhythm, no murmur, equal pulses  GI: Soft, non tender, non-distended, normal bowel sounds  MSK: no lower extremity edema  Neuro: No focal deficit, oriented, CN II-XII grossly intact  Psych: Cooperative, appropriate affect, appropriate judgement      Intake/Output Summary (Last 24 hours) at 2/2/2025 1007  Last data filed at 2/2/2025 0100  Gross per 24 hour   Intake 200 ml   Output 700 ml   Net -500 ml       Relevant Results  Scheduled medications  aspirin, 81 mg, oral, Daily  atorvastatin, 40 mg, oral, Nightly  docusate sodium, 100 mg, oral, TID  heparin, 5,000 Units, subcutaneous, q8h  insulin lispro, 0-15 Units, subcutaneous, q4h  insulin lispro, 10 Units, subcutaneous, Daily with breakfast  insulin lispro, 10 Units, subcutaneous, Daily with evening meal  metoclopramide, 10 mg, intravenous, q6h EUFEMIA  metoprolol tartrate, 50 mg, oral, BID  pantoprazole, 40 mg, intravenous, BID  tamsulosin, 0.4 mg, oral, Daily before breakfast      Continuous medications       PRN medications  PRN medications: acetaminophen **OR** acetaminophen **OR** acetaminophen, dextrose, dextrose, dextrose, glucagon, glucagon, hydrALAZINE, melatonin, ondansetron ODT **OR** " ondansetron, oxyCODONE-acetaminophen, polyethylene glycol        Assessment    Assessment & Plan  Dehydration  -resolved  Syncope and collapse  -unclear syncope vs seizure activity   -CT without acute change, MRI showing small vessel ischemic changes and 7mm subcortical parietal infarct that is either acute or subacute  -neurology consulted and signed off, advised improved BG control, LDL at goal, continue aspirin  -EEG without epileptiform discharges  NSTEMI (non-ST elevated myocardial infarction) (Multi)  -cardiology evaluated, troponins downtrended  -no further cardiac work-up indicated per 01/30 note  Diabetic ketoacidosis without coma associated with type 2 diabetes mellitus (Multi)  -continue SSI  Pancolitis (Multi)  -improving  Ambulatory dysfunction  -PT/OT      DVT prophylaxis: Heparin SubQ  Disposition: Pre-cert pending for SNF      I spent 30 minutes in the professional and overall care of this patient.    Jenn Braun

## 2025-02-03 LAB
ALBUMIN SERPL BCP-MCNC: 3.1 G/DL (ref 3.4–5)
ALP SERPL-CCNC: 62 U/L (ref 33–136)
ALT SERPL W P-5'-P-CCNC: 29 U/L (ref 10–52)
ANION GAP SERPL CALCULATED.3IONS-SCNC: 9 MMOL/L (ref 10–20)
AST SERPL W P-5'-P-CCNC: 18 U/L (ref 9–39)
BILIRUB SERPL-MCNC: 0.6 MG/DL (ref 0–1.2)
BUN SERPL-MCNC: 17 MG/DL (ref 6–23)
CALCIUM SERPL-MCNC: 8 MG/DL (ref 8.6–10.3)
CHLORIDE SERPL-SCNC: 100 MMOL/L (ref 98–107)
CO2 SERPL-SCNC: 27 MMOL/L (ref 21–32)
CREAT SERPL-MCNC: 1.1 MG/DL (ref 0.5–1.3)
EGFRCR SERPLBLD CKD-EPI 2021: 74 ML/MIN/1.73M*2
ERYTHROCYTE [DISTWIDTH] IN BLOOD BY AUTOMATED COUNT: 14 % (ref 11.5–14.5)
GLUCOSE BLD MANUAL STRIP-MCNC: 171 MG/DL (ref 74–99)
GLUCOSE BLD MANUAL STRIP-MCNC: 234 MG/DL (ref 74–99)
GLUCOSE BLD MANUAL STRIP-MCNC: 234 MG/DL (ref 74–99)
GLUCOSE BLD MANUAL STRIP-MCNC: 236 MG/DL (ref 74–99)
GLUCOSE BLD MANUAL STRIP-MCNC: 295 MG/DL (ref 74–99)
GLUCOSE SERPL-MCNC: 141 MG/DL (ref 74–99)
HCT VFR BLD AUTO: 28.4 % (ref 41–52)
HGB BLD-MCNC: 9.8 G/DL (ref 13.5–17.5)
MCH RBC QN AUTO: 29.9 PG (ref 26–34)
MCHC RBC AUTO-ENTMCNC: 34.5 G/DL (ref 32–36)
MCV RBC AUTO: 87 FL (ref 80–100)
NRBC BLD-RTO: 0 /100 WBCS (ref 0–0)
PLATELET # BLD AUTO: 189 X10*3/UL (ref 150–450)
POTASSIUM SERPL-SCNC: 3.6 MMOL/L (ref 3.5–5.3)
PROT SERPL-MCNC: 5 G/DL (ref 6.4–8.2)
RBC # BLD AUTO: 3.28 X10*6/UL (ref 4.5–5.9)
SODIUM SERPL-SCNC: 132 MMOL/L (ref 136–145)
WBC # BLD AUTO: 7.7 X10*3/UL (ref 4.4–11.3)

## 2025-02-03 PROCEDURE — 2500000001 HC RX 250 WO HCPCS SELF ADMINISTERED DRUGS (ALT 637 FOR MEDICARE OP): Performed by: STUDENT IN AN ORGANIZED HEALTH CARE EDUCATION/TRAINING PROGRAM

## 2025-02-03 PROCEDURE — 2500000002 HC RX 250 W HCPCS SELF ADMINISTERED DRUGS (ALT 637 FOR MEDICARE OP, ALT 636 FOR OP/ED): Performed by: STUDENT IN AN ORGANIZED HEALTH CARE EDUCATION/TRAINING PROGRAM

## 2025-02-03 PROCEDURE — 36415 COLL VENOUS BLD VENIPUNCTURE: CPT | Performed by: INTERNAL MEDICINE

## 2025-02-03 PROCEDURE — 2500000001 HC RX 250 WO HCPCS SELF ADMINISTERED DRUGS (ALT 637 FOR MEDICARE OP): Performed by: INTERNAL MEDICINE

## 2025-02-03 PROCEDURE — 85027 COMPLETE CBC AUTOMATED: CPT | Performed by: INTERNAL MEDICINE

## 2025-02-03 PROCEDURE — 82947 ASSAY GLUCOSE BLOOD QUANT: CPT

## 2025-02-03 PROCEDURE — 80053 COMPREHEN METABOLIC PANEL: CPT | Performed by: INTERNAL MEDICINE

## 2025-02-03 PROCEDURE — 51701 INSERT BLADDER CATHETER: CPT

## 2025-02-03 PROCEDURE — 99232 SBSQ HOSP IP/OBS MODERATE 35: CPT | Performed by: INTERNAL MEDICINE

## 2025-02-03 PROCEDURE — 2500000002 HC RX 250 W HCPCS SELF ADMINISTERED DRUGS (ALT 637 FOR MEDICARE OP, ALT 636 FOR OP/ED): Performed by: SURGERY

## 2025-02-03 PROCEDURE — 2500000004 HC RX 250 GENERAL PHARMACY W/ HCPCS (ALT 636 FOR OP/ED): Performed by: INTERNAL MEDICINE

## 2025-02-03 PROCEDURE — 2500000002 HC RX 250 W HCPCS SELF ADMINISTERED DRUGS (ALT 637 FOR MEDICARE OP, ALT 636 FOR OP/ED): Performed by: INTERNAL MEDICINE

## 2025-02-03 PROCEDURE — 1200000002 HC GENERAL ROOM WITH TELEMETRY DAILY

## 2025-02-03 RX ORDER — INSULIN LISPRO 100 [IU]/ML
0-10 INJECTION, SOLUTION INTRAVENOUS; SUBCUTANEOUS
Status: DISCONTINUED | OUTPATIENT
Start: 2025-02-03 | End: 2025-02-05 | Stop reason: HOSPADM

## 2025-02-03 RX ORDER — TAMSULOSIN HYDROCHLORIDE 0.4 MG/1
0.8 CAPSULE ORAL
Status: DISCONTINUED | OUTPATIENT
Start: 2025-02-04 | End: 2025-02-05 | Stop reason: HOSPADM

## 2025-02-03 RX ORDER — DEXTROSE 50 % IN WATER (D50W) INTRAVENOUS SYRINGE
25
Status: DISCONTINUED | OUTPATIENT
Start: 2025-02-03 | End: 2025-02-05 | Stop reason: HOSPADM

## 2025-02-03 RX ORDER — DEXTROSE 50 % IN WATER (D50W) INTRAVENOUS SYRINGE
12.5
Status: DISCONTINUED | OUTPATIENT
Start: 2025-02-03 | End: 2025-02-05 | Stop reason: HOSPADM

## 2025-02-03 RX ORDER — DIPHENHYDRAMINE HCL 25 MG
25 TABLET ORAL EVERY 6 HOURS PRN
Status: DISCONTINUED | OUTPATIENT
Start: 2025-02-03 | End: 2025-02-05 | Stop reason: HOSPADM

## 2025-02-03 RX ADMIN — HEPARIN SODIUM 5000 UNITS: 5000 INJECTION, SOLUTION INTRAVENOUS; SUBCUTANEOUS at 14:32

## 2025-02-03 RX ADMIN — ASPIRIN 81 MG CHEWABLE TABLET 81 MG: 81 TABLET CHEWABLE at 08:17

## 2025-02-03 RX ADMIN — HEPARIN SODIUM 5000 UNITS: 5000 INJECTION, SOLUTION INTRAVENOUS; SUBCUTANEOUS at 22:51

## 2025-02-03 RX ADMIN — METOPROLOL TARTRATE 50 MG: 50 TABLET, FILM COATED ORAL at 08:17

## 2025-02-03 RX ADMIN — PANTOPRAZOLE SODIUM 40 MG: 40 INJECTION, POWDER, FOR SOLUTION INTRAVENOUS at 23:52

## 2025-02-03 RX ADMIN — DOCUSATE SODIUM 100 MG: 100 CAPSULE, LIQUID FILLED ORAL at 08:16

## 2025-02-03 RX ADMIN — INSULIN LISPRO 9 UNITS: 100 INJECTION, SOLUTION INTRAVENOUS; SUBCUTANEOUS at 02:35

## 2025-02-03 RX ADMIN — INSULIN LISPRO 3 UNITS: 100 INJECTION, SOLUTION INTRAVENOUS; SUBCUTANEOUS at 10:52

## 2025-02-03 RX ADMIN — INSULIN LISPRO 10 UNITS: 100 INJECTION, SOLUTION INTRAVENOUS; SUBCUTANEOUS at 08:19

## 2025-02-03 RX ADMIN — PANTOPRAZOLE SODIUM 40 MG: 40 INJECTION, POWDER, FOR SOLUTION INTRAVENOUS at 08:17

## 2025-02-03 RX ADMIN — ATORVASTATIN CALCIUM 40 MG: 40 TABLET, FILM COATED ORAL at 22:47

## 2025-02-03 RX ADMIN — METOPROLOL TARTRATE 50 MG: 50 TABLET, FILM COATED ORAL at 22:47

## 2025-02-03 RX ADMIN — INSULIN LISPRO 10 UNITS: 100 INJECTION, SOLUTION INTRAVENOUS; SUBCUTANEOUS at 16:59

## 2025-02-03 RX ADMIN — TAMSULOSIN HYDROCHLORIDE 0.4 MG: 0.4 CAPSULE ORAL at 06:04

## 2025-02-03 RX ADMIN — HEPARIN SODIUM 5000 UNITS: 5000 INJECTION, SOLUTION INTRAVENOUS; SUBCUTANEOUS at 05:50

## 2025-02-03 ASSESSMENT — PAIN - FUNCTIONAL ASSESSMENT: PAIN_FUNCTIONAL_ASSESSMENT: 0-10

## 2025-02-03 ASSESSMENT — PAIN SCALES - GENERAL: PAINLEVEL_OUTOF10: 0 - NO PAIN

## 2025-02-03 NOTE — NURSING NOTE
Inpatient Diabetes Education follow up:    POCT glucose today 2/3/25 at 07:17 was 234 mg/dl.        Diabetes medication changes:  Lispro increased to 10 units q AM & q PM,  Still getting Lispro 0-15 units every 4 hours.       Possible discharge today.   Will continue to monitor POCT glucose.

## 2025-02-03 NOTE — CARE PLAN
The patient's goals for the shift include  leave today    The clinical goals for the shift include monitor blood glucose/straight catherisation/discharge preparation    Over the shift, the patient did not make progress toward the following goals. Barriers to progression include waiting on information from [facility]. Recommendations to address these barriers include continue plan of care until discharge.

## 2025-02-03 NOTE — PROGRESS NOTES
02/03/25 1532   Discharge Planning   Home or Post Acute Services Post acute facilities (Rehab/SNF/etc)   Type of Post Acute Facility Services Skilled nursing  (Accepted by [facility].  Precert initiated by facility 1/31/25 at 0900.  Escalated by  Team 1450 today.  Awaiting updates.)   Expected Discharge Disposition SNF   Does the patient need discharge transport arranged? Yes   RoundTrip coordination needed? Yes   Has discharge transport been arranged? No

## 2025-02-03 NOTE — PROGRESS NOTES
Devin Gomez is a 64 y.o. male on day 13 of admission presenting with Dehydration.      Subjective   He feels much improved compared to last week.  Patient notes to me from last week  Awaiting disposition to skilled nursing facility.  Reviewed notes from hospitalist and care manager.  Patient tells me that he is still using intermittent catheterization for his bladder retention and that this works good for him  Reviewed notes from diabetic educator  Reviewed MRI results of the brain and neurology input       Objective     Last Recorded Vitals  /68 (BP Location: Right arm, Patient Position: Lying)   Pulse 68   Temp 36.2 °C (97.2 °F) (Temporal)   Resp 19   Wt 64.2 kg (141 lb 8.6 oz)   SpO2 98%   Intake/Output last 3 Shifts:    Intake/Output Summary (Last 24 hours) at 2/3/2025 1126  Last data filed at 2/3/2025 0700  Gross per 24 hour   Intake 200 ml   Output 1790 ml   Net -1590 ml       Physical Exam  Alert oriented x 3 cooperative no distress  Chest clear  Heart regular  Abdomen soft nontender extremities no edema  Neurologic exam nonfocal  Relevant Results  Ordered surveillance daily labs will review results when available  Assessment/Plan     Assessment & Plan  Dehydration  -resolved  Syncope and collapse  -unclear syncope vs seizure activity   -CT without acute change, MRI showing small vessel ischemic changes and 7mm subcortical parietal infarct that is either acute or subacute  -neurology consulted and signed off, advised improved BG control, LDL at goal, continue aspirin  -EEG without epileptiform discharges  NSTEMI (non-ST elevated myocardial infarction) (Multi)  -cardiology evaluated, troponins downtrended  -no further cardiac work-up indicated per 01/30 note  Diabetic ketoacidosis without coma associated with type 2 diabetes mellitus (Multi)  -continue SSI  Pancolitis (Multi)  -improving  Ambulatory dysfunction  -PT/OT    February 3,    Clinically appears much improved.  Will check labs but I  feel stable for discharge to skilled nursing facility.  Will change sliding scale to 4 times a day with meals             Ismael Knowles MD

## 2025-02-03 NOTE — PROGRESS NOTES
"Nutrition Follow up Note    Nutrition Assessment      Plan for discharge to SNF when ready.    Nutrition History:  Energy Intake: Good > 75 %  GI Symptoms: Diarrhea - resolved    Anthropometrics:  Ht: 175.3 cm (5' 9\"), Wt: 64.2 kg (141 lb 8.6 oz), BMI: 20.89  IBW/kg (Dietitian Calculated): 72.73 kg  Percent of IBW: 82 %    Weight Change:  Daily Weight  02/03/25 0714 64.2 kg (141 lb 8.6 oz)   02/03/25 0300 64.2 kg (141 lb 8.6 oz)   02/02/25 0827 63 kg (138 lb 14.2 oz)   02/01/25 0830 62.8 kg (138 lb 7.2 oz)   02/01/25 0405 62.9 kg (138 lb 9.6 oz)   01/31/25 0855 63.1 kg (139 lb 1.8 oz)   01/31/25 0417 63.1 kg (139 lb 3.2 oz)   01/30/25 1231 66.2 kg (145 lb 15.1 oz)   01/30/25 0406 66.2 kg (145 lb 15.1 oz)   01/29/25 0300 60.1 kg (132 lb 7.9 oz)   01/28/25 0300 59.5 kg (131 lb 2.8 oz)   01/27/25 0700 61.3 kg (135 lb 2.3 oz)    Weight: obtained by [facility] at 01/27/25 0700   01/27/25 0329 61.4 kg (135 lb 4.8 oz)   01/26/25 1051 61 kg (134 lb 7.7 oz)   01/25/25 1257 61 kg (134 lb 7.7 oz)   01/24/25 0737 61 kg (134 lb 7.7 oz)   01/24/25 0412 61.1 kg (134 lb 9.6 oz)   01/23/25 0755 61 kg (134 lb 7.7 oz)   01/23/25 0311 61.1 kg (134 lb 12.8 oz)   01/22/25 1252 60 kg (132 lb 4.4 oz)   01/22/25 0455 59.8 kg (131 lb 12.8 oz)   01/21/25 1857 60.8 kg (134 lb)   01/21/25 1207 65.8 kg (145 lb)   06/03/24 : 64.4 kg (142 lb)  04/10/23 : 65.8 kg (145 lb)  01/04/22 : 67.1 kg (148 lb)     Weight History / % Weight Change: reprots usual wt 145-150#. confirms current wt of 135# - believes this 10# (6.8%) wt loss occured over the past 1 week  Significant Weight Loss: Yes  Interpretation of Weight Loss: >2% in 1 week    Nutrition Focused Physical Exam Findings:   Subcutaneous Fat Loss  Orbital Fat Pads: Well nourished (slightly bulging fat pads)  Buccal Fat Pads: Mild-Moderate (flat cheeks, minimal bounce)    Muscle Wasting  Temporalis: Mild-Moderate (slight depression)  Pectoralis (Clavicular Region): Mild-Moderate (some protrusion of " clavicle)  Deltoid/Trapezius: Mild-Moderate (slight protrusion of acromion process)  Trapezius/Infraspinatus/Supraspinatus (Scapular Region): Severe (prominent visual scapula, depression between ribs, scapula or shoulder)  Quadriceps: Severe (depressions on inner and outer thigh)  Gastrocnemius: Severe (minimal muscle definition)    Nutrition Significant Labs:  Lab Results   Component Value Date    WBC 5.2 02/01/2025    HGB 8.5 (L) 02/01/2025    HCT 25.3 (L) 02/01/2025     02/01/2025    CHOL 145 01/29/2025    TRIG 65 01/29/2025    HDL 66.2 01/29/2025    ALT 24 02/01/2025    AST 17 02/01/2025     (L) 02/01/2025    K 3.3 (L) 02/01/2025     02/01/2025    CREATININE 1.19 02/01/2025    BUN 24 (H) 02/01/2025    CO2 24 02/01/2025    TSH 2.20 01/21/2025    PSA 0.2 11/02/2020    HGBA1C 10.2 (H) 01/21/2025     Nutrition Specific Medications:  aspirin, 81 mg, oral, Daily  atorvastatin, 40 mg, oral, Nightly  docusate sodium, 100 mg, oral, TID  heparin, 5,000 Units, subcutaneous, q8h  insulin lispro, 0-10 Units, subcutaneous, TID AC  insulin lispro, 10 Units, subcutaneous, Daily with breakfast  insulin lispro, 10 Units, subcutaneous, Daily with evening meal  metoprolol tartrate, 50 mg, oral, BID  pantoprazole, 40 mg, intravenous, BID  tamsulosin, 0.4 mg, oral, Daily before breakfast      Dietary Orders (From admission, onward)       Start     Ordered    02/03/25 1124  Adult diet Regular, Consistent Carb; CCD 90 gm/meal  Diet effective now        Question Answer Comment   Diet type Regular    Diet type Consistent Carb    Carb diet selection: CCD 90 gm/meal        02/03/25 1124    01/30/25 1306  Oral nutritional supplements  Until discontinued        Comments: Vanilla   Question Answer Comment   Deliver with Dinner    Deliver with Breakfast    Select supplement: Ensure High Protein        01/30/25 1305    01/22/25 1247  May Participate in Room Service  ( ROOM SERVICE MAY PARTICIPATE)  Once        Question:  .   Answer:  Yes    01/22/25 1246                  Estimated Needs:   Estimated Energy Needs  Total Energy Estimated Needs in 24 hours (kCal): 1786 kCal  Energy Estimated Needs per kg Body Weight in 24 hours (kCal/kg): 30 kCal/kg  Method for Estimating Needs: actual wt    Estimated Protein Needs  Total Protein Estimated Needs in 24 Hours (g): 71 g  Protein Estimated Needs per kg Body Weight in 24 Hours (g/kg): 1.2 g/kg  Method for Estimating 24 Hour Protein Needs: actual wt    Estimated Fluid Needs  Method for Estimating 24 Hour Fluid Needs: 1 ml/kcal or per MD        Nutrition Diagnosis   Nutrition Diagnosis:  Malnutrition Diagnosis  Patient has Malnutrition Diagnosis: Yes  Diagnosis Status: Active  Malnutrition Diagnosis: Severe malnutrition related to acute disease or injury  As Evidenced by: 10# (6.8%) wt loss over 1 week, moderate/severe subcutaneous fat loss and muscle wasting, po intake </= 50% estimated needs for >/= 5 days    Nutrition Diagnosis  Patient has Nutrition Diagnosis: Yes  Diagnosis Status (1): Active  Nutrition Diagnosis 1: Altered nutrition related to laboratory values  Related to (1): physiological causes  As Evidenced by (1): A1c 10.2% (1/21/25)       Nutrition Interventions/Recommendations   Nutrition Interventions and Recommendations:  Nutrition Prescription: Nutrition prescription for oral nutrition    Nutrition Recommendations:  Individualized Nutrition Prescription Provided for : 1786 kcals and 71g protein to be provided via diet and supplements    Nutrition Interventions/Goals:   Food and/or Nutrient Delivery Interventions  Interventions: Meals and snacks, Medical food supplement  Meals and Snacks: Carbohydrate-modified diet  Goal: recommend CCD 60g diet  Medical Food Supplement: Commercial beverage medical food supplement therapy  Goal: vanilla ensure high protein BID daily to provide 160 kcals and 16g protein each    Education Documentation  No documentation found.           Nutrition  Monitoring and Evaluation   Monitoring/Evaluation:   Food/Nutrient Related History Monitoring  Monitoring and Evaluation Plan: Estimated Energy Intake  Estimated Energy Intake: Energy intake greater or equal to 75% of estimated energy needs    Anthropometric Measurements  Monitoring and Evaluation Plan: Body weight  Body Weight: Body weight - Maintain stable weight    Biochemical Data, Medical Tests and Procedures  Monitoring and Evaluation Plan: Glucose/endocrine profile  Glucose/Endocrine Profile: Glucose within normal limits - ICU (140-180 mg/dL)    Goal Status: Some progress toward goal(s)       Time Spent (min): 30 minutes

## 2025-02-03 NOTE — CARE PLAN
The clinical goals for the shift include monitor blood glucose/straight catherisation/discharge preparation

## 2025-02-04 LAB
ALBUMIN SERPL BCP-MCNC: 3.2 G/DL (ref 3.4–5)
ALP SERPL-CCNC: 87 U/L (ref 33–136)
ALT SERPL W P-5'-P-CCNC: 29 U/L (ref 10–52)
ANION GAP SERPL CALCULATED.3IONS-SCNC: 12 MMOL/L (ref 10–20)
ANION GAP SERPL CALCULATED.3IONS-SCNC: 19 MMOL/L (ref 10–20)
AST SERPL W P-5'-P-CCNC: 19 U/L (ref 9–39)
BASOPHILS # BLD AUTO: 0.02 X10*3/UL (ref 0–0.1)
BASOPHILS NFR BLD AUTO: 0.4 %
BILIRUB SERPL-MCNC: 0.7 MG/DL (ref 0–1.2)
BUN SERPL-MCNC: 20 MG/DL (ref 6–23)
BUN SERPL-MCNC: 22 MG/DL (ref 6–23)
CALCIUM SERPL-MCNC: 8 MG/DL (ref 8.6–10.3)
CALCIUM SERPL-MCNC: 8.1 MG/DL (ref 8.6–10.3)
CHLORIDE SERPL-SCNC: 96 MMOL/L (ref 98–107)
CHLORIDE SERPL-SCNC: 97 MMOL/L (ref 98–107)
CO2 SERPL-SCNC: 19 MMOL/L (ref 21–32)
CO2 SERPL-SCNC: 25 MMOL/L (ref 21–32)
CREAT SERPL-MCNC: 1.07 MG/DL (ref 0.5–1.3)
CREAT SERPL-MCNC: 1.31 MG/DL (ref 0.5–1.3)
EGFRCR SERPLBLD CKD-EPI 2021: 60 ML/MIN/1.73M*2
EGFRCR SERPLBLD CKD-EPI 2021: 77 ML/MIN/1.73M*2
EOSINOPHIL # BLD AUTO: 0.09 X10*3/UL (ref 0–0.7)
EOSINOPHIL NFR BLD AUTO: 1.7 %
ERYTHROCYTE [DISTWIDTH] IN BLOOD BY AUTOMATED COUNT: 14.5 % (ref 11.5–14.5)
GLUCOSE BLD MANUAL STRIP-MCNC: 172 MG/DL (ref 74–99)
GLUCOSE BLD MANUAL STRIP-MCNC: 176 MG/DL (ref 74–99)
GLUCOSE BLD MANUAL STRIP-MCNC: 304 MG/DL (ref 74–99)
GLUCOSE BLD MANUAL STRIP-MCNC: 304 MG/DL (ref 74–99)
GLUCOSE BLD MANUAL STRIP-MCNC: 471 MG/DL (ref 74–99)
GLUCOSE BLD MANUAL STRIP-MCNC: 99 MG/DL (ref 74–99)
GLUCOSE SERPL-MCNC: 251 MG/DL (ref 74–99)
GLUCOSE SERPL-MCNC: 417 MG/DL (ref 74–99)
HCT VFR BLD AUTO: 28.5 % (ref 41–52)
HGB BLD-MCNC: 9.8 G/DL (ref 13.5–17.5)
IMM GRANULOCYTES # BLD AUTO: 0.02 X10*3/UL (ref 0–0.7)
IMM GRANULOCYTES NFR BLD AUTO: 0.4 % (ref 0–0.9)
LYMPHOCYTES # BLD AUTO: 1.1 X10*3/UL (ref 1.2–4.8)
LYMPHOCYTES NFR BLD AUTO: 20.3 %
MCH RBC QN AUTO: 30 PG (ref 26–34)
MCHC RBC AUTO-ENTMCNC: 34.4 G/DL (ref 32–36)
MCV RBC AUTO: 87 FL (ref 80–100)
MONOCYTES # BLD AUTO: 0.32 X10*3/UL (ref 0.1–1)
MONOCYTES NFR BLD AUTO: 5.9 %
NEUTROPHILS # BLD AUTO: 3.86 X10*3/UL (ref 1.2–7.7)
NEUTROPHILS NFR BLD AUTO: 71.3 %
NRBC BLD-RTO: ABNORMAL /100{WBCS}
PLATELET # BLD AUTO: 202 X10*3/UL (ref 150–450)
POTASSIUM SERPL-SCNC: 4.7 MMOL/L (ref 3.5–5.3)
POTASSIUM SERPL-SCNC: 5 MMOL/L (ref 3.5–5.3)
PROT SERPL-MCNC: 5.1 G/DL (ref 6.4–8.2)
RBC # BLD AUTO: 3.27 X10*6/UL (ref 4.5–5.9)
RBC MORPH BLD: NORMAL
SODIUM SERPL-SCNC: 129 MMOL/L (ref 136–145)
SODIUM SERPL-SCNC: 129 MMOL/L (ref 136–145)
WBC # BLD AUTO: 5.4 X10*3/UL (ref 4.4–11.3)

## 2025-02-04 PROCEDURE — 97535 SELF CARE MNGMENT TRAINING: CPT | Mod: GO

## 2025-02-04 PROCEDURE — 36415 COLL VENOUS BLD VENIPUNCTURE: CPT | Performed by: INTERNAL MEDICINE

## 2025-02-04 PROCEDURE — 51701 INSERT BLADDER CATHETER: CPT

## 2025-02-04 PROCEDURE — 97116 GAIT TRAINING THERAPY: CPT | Mod: GP

## 2025-02-04 PROCEDURE — 82947 ASSAY GLUCOSE BLOOD QUANT: CPT

## 2025-02-04 PROCEDURE — 1200000002 HC GENERAL ROOM WITH TELEMETRY DAILY

## 2025-02-04 PROCEDURE — 80048 BASIC METABOLIC PNL TOTAL CA: CPT | Mod: CCI | Performed by: INTERNAL MEDICINE

## 2025-02-04 PROCEDURE — 2500000002 HC RX 250 W HCPCS SELF ADMINISTERED DRUGS (ALT 637 FOR MEDICARE OP, ALT 636 FOR OP/ED): Performed by: STUDENT IN AN ORGANIZED HEALTH CARE EDUCATION/TRAINING PROGRAM

## 2025-02-04 PROCEDURE — 2500000002 HC RX 250 W HCPCS SELF ADMINISTERED DRUGS (ALT 637 FOR MEDICARE OP, ALT 636 FOR OP/ED): Performed by: INTERNAL MEDICINE

## 2025-02-04 PROCEDURE — 97110 THERAPEUTIC EXERCISES: CPT | Mod: GP

## 2025-02-04 PROCEDURE — 2500000001 HC RX 250 WO HCPCS SELF ADMINISTERED DRUGS (ALT 637 FOR MEDICARE OP): Performed by: INTERNAL MEDICINE

## 2025-02-04 PROCEDURE — 99232 SBSQ HOSP IP/OBS MODERATE 35: CPT | Performed by: INTERNAL MEDICINE

## 2025-02-04 PROCEDURE — 2500000001 HC RX 250 WO HCPCS SELF ADMINISTERED DRUGS (ALT 637 FOR MEDICARE OP): Performed by: STUDENT IN AN ORGANIZED HEALTH CARE EDUCATION/TRAINING PROGRAM

## 2025-02-04 PROCEDURE — 80053 COMPREHEN METABOLIC PANEL: CPT | Performed by: INTERNAL MEDICINE

## 2025-02-04 PROCEDURE — 2500000004 HC RX 250 GENERAL PHARMACY W/ HCPCS (ALT 636 FOR OP/ED): Performed by: INTERNAL MEDICINE

## 2025-02-04 PROCEDURE — 2500000002 HC RX 250 W HCPCS SELF ADMINISTERED DRUGS (ALT 637 FOR MEDICARE OP, ALT 636 FOR OP/ED): Performed by: REGISTERED NURSE

## 2025-02-04 PROCEDURE — 85025 COMPLETE CBC W/AUTO DIFF WBC: CPT | Performed by: INTERNAL MEDICINE

## 2025-02-04 RX ORDER — METOPROLOL TARTRATE 25 MG/1
25 TABLET, FILM COATED ORAL 2 TIMES DAILY
Status: DISCONTINUED | OUTPATIENT
Start: 2025-02-04 | End: 2025-02-05 | Stop reason: HOSPADM

## 2025-02-04 RX ORDER — INSULIN GLARGINE 100 [IU]/ML
15 INJECTION, SOLUTION SUBCUTANEOUS EVERY 24 HOURS
Status: DISCONTINUED | OUTPATIENT
Start: 2025-02-04 | End: 2025-02-05 | Stop reason: HOSPADM

## 2025-02-04 RX ORDER — TRIAMCINOLONE ACETONIDE 1 MG/G
CREAM TOPICAL 2 TIMES DAILY
Qty: 15 G | Refills: 0 | Status: SHIPPED | OUTPATIENT
Start: 2025-02-04 | End: 2025-02-05 | Stop reason: HOSPADM

## 2025-02-04 RX ORDER — INSULIN LISPRO 100 [IU]/ML
10 INJECTION, SOLUTION INTRAVENOUS; SUBCUTANEOUS ONCE
Status: COMPLETED | OUTPATIENT
Start: 2025-02-04 | End: 2025-02-04

## 2025-02-04 RX ADMIN — INSULIN LISPRO 8 UNITS: 100 INJECTION, SOLUTION INTRAVENOUS; SUBCUTANEOUS at 16:40

## 2025-02-04 RX ADMIN — HEPARIN SODIUM 5000 UNITS: 5000 INJECTION, SOLUTION INTRAVENOUS; SUBCUTANEOUS at 06:45

## 2025-02-04 RX ADMIN — PANTOPRAZOLE SODIUM 40 MG: 40 INJECTION, POWDER, FOR SOLUTION INTRAVENOUS at 08:35

## 2025-02-04 RX ADMIN — HEPARIN SODIUM 5000 UNITS: 5000 INJECTION, SOLUTION INTRAVENOUS; SUBCUTANEOUS at 21:06

## 2025-02-04 RX ADMIN — DOCUSATE SODIUM 100 MG: 100 CAPSULE, LIQUID FILLED ORAL at 20:38

## 2025-02-04 RX ADMIN — INSULIN LISPRO 8 UNITS: 100 INJECTION, SOLUTION INTRAVENOUS; SUBCUTANEOUS at 08:34

## 2025-02-04 RX ADMIN — INSULIN LISPRO 10 UNITS: 100 INJECTION, SOLUTION INTRAVENOUS; SUBCUTANEOUS at 16:38

## 2025-02-04 RX ADMIN — METOPROLOL TARTRATE 50 MG: 50 TABLET, FILM COATED ORAL at 08:35

## 2025-02-04 RX ADMIN — ATORVASTATIN CALCIUM 40 MG: 40 TABLET, FILM COATED ORAL at 20:38

## 2025-02-04 RX ADMIN — ASPIRIN 81 MG CHEWABLE TABLET 81 MG: 81 TABLET CHEWABLE at 08:35

## 2025-02-04 RX ADMIN — INSULIN GLARGINE 15 UNITS: 100 INJECTION, SOLUTION SUBCUTANEOUS at 15:59

## 2025-02-04 RX ADMIN — INSULIN LISPRO 2 UNITS: 100 INJECTION, SOLUTION INTRAVENOUS; SUBCUTANEOUS at 13:02

## 2025-02-04 RX ADMIN — PANTOPRAZOLE SODIUM 40 MG: 40 INJECTION, POWDER, FOR SOLUTION INTRAVENOUS at 20:38

## 2025-02-04 RX ADMIN — SODIUM CHLORIDE, SODIUM LACTATE, POTASSIUM CHLORIDE, AND CALCIUM CHLORIDE 1000 ML: 600; 310; 30; 20 INJECTION, SOLUTION INTRAVENOUS at 13:03

## 2025-02-04 RX ADMIN — HEPARIN SODIUM 5000 UNITS: 5000 INJECTION, SOLUTION INTRAVENOUS; SUBCUTANEOUS at 14:38

## 2025-02-04 RX ADMIN — Medication 5 MG: at 20:38

## 2025-02-04 RX ADMIN — INSULIN LISPRO 10 UNITS: 100 INJECTION, SOLUTION INTRAVENOUS; SUBCUTANEOUS at 05:05

## 2025-02-04 RX ADMIN — DOCUSATE SODIUM 100 MG: 100 CAPSULE, LIQUID FILLED ORAL at 08:35

## 2025-02-04 RX ADMIN — DOCUSATE SODIUM 100 MG: 100 CAPSULE, LIQUID FILLED ORAL at 14:38

## 2025-02-04 RX ADMIN — TAMSULOSIN HYDROCHLORIDE 0.8 MG: 0.4 CAPSULE ORAL at 06:45

## 2025-02-04 RX ADMIN — INSULIN LISPRO 10 UNITS: 100 INJECTION, SOLUTION INTRAVENOUS; SUBCUTANEOUS at 08:34

## 2025-02-04 ASSESSMENT — ACTIVITIES OF DAILY LIVING (ADL)
BATHING_LEVEL_OF_ASSISTANCE: MINIMUM ASSISTANCE
BATHING_WHERE_ASSESSED: OTHER (COMMENT)
HOME_MANAGEMENT_TIME_ENTRY: 24

## 2025-02-04 ASSESSMENT — COGNITIVE AND FUNCTIONAL STATUS - GENERAL
HELP NEEDED FOR BATHING: A LOT
TOILETING: A LOT
MOVING FROM LYING ON BACK TO SITTING ON SIDE OF FLAT BED WITH BEDRAILS: A LITTLE
WALKING IN HOSPITAL ROOM: A LOT
MOBILITY SCORE: 16
DAILY ACTIVITIY SCORE: 17
DRESSING REGULAR LOWER BODY CLOTHING: A LOT
MOVING TO AND FROM BED TO CHAIR: A LITTLE
CLIMB 3 TO 5 STEPS WITH RAILING: A LOT
DRESSING REGULAR UPPER BODY CLOTHING: A LITTLE
TURNING FROM BACK TO SIDE WHILE IN FLAT BAD: A LITTLE
STANDING UP FROM CHAIR USING ARMS: A LITTLE

## 2025-02-04 ASSESSMENT — PAIN - FUNCTIONAL ASSESSMENT
PAIN_FUNCTIONAL_ASSESSMENT: 0-10

## 2025-02-04 ASSESSMENT — PAIN SCALES - GENERAL
PAINLEVEL_OUTOF10: 0 - NO PAIN

## 2025-02-04 ASSESSMENT — PAIN SCALES - WONG BAKER: WONGBAKER_NUMERICALRESPONSE: NO HURT

## 2025-02-04 NOTE — CARE PLAN
The patient's goals for the shift include      The clinical goals for the shift include void w/o straight cath

## 2025-02-04 NOTE — PROGRESS NOTES
02/04/25 1116   Discharge Planning   Home or Post Acute Services Post acute facilities (Rehab/SNF/etc)   Type of Post Acute Facility Services Skilled nursing  (Accepted by [facility].  [facility] resubmitted by facility at this time.  Awaiting updates.)   Expected Discharge Disposition SNF   Does the patient need discharge transport arranged? Yes   RoundTrip coordination needed? Yes   Has discharge transport been arranged? No

## 2025-02-04 NOTE — PROGRESS NOTES
Occupational Therapy    Occupational Therapy Treatment    Name: Devin Gomez  MRN: [id number]  Department: [facility] 4 ICU  Room: [address] [place], [place] [address]  Date: 02/04/25  Time Calculation  Start Time: 1017  Stop Time: 1041  Time Calculation (min): 24 min    Assessment:  OT Assessment: Patient is limited this date by low BP. RN is made aware. Patient will continue to benefit from skilled OT to maximize safety and independence with daily tasks.  Prognosis: Good  Barriers to Discharge Home: Caregiver assistance, Physical needs  Caregiver Assistance: Patient lives alone and/or does not have reliable caregiver assistance  Physical Needs: Intermittent mobility assistance needed, Intermittent ADL assistance needed  Evaluation/Treatment Tolerance: Patient limited by fatigue  End of Session Communication: Bedside nurse  End of Session Patient Position: Up in chair, Alarm on (RN aware, patient educated to use call light for txfers, verbalizes good undersanding)  Plan:  Treatment Interventions: Functional transfer training, ADL retraining, UE strengthening/ROM, Endurance training, Patient/family training, Compensatory technique education  OT Frequency: 3 times per week  OT Discharge Recommendations: Moderate intensity level of continued care  Equipment Recommended upon Discharge: Wheeled walker, Standard walker  OT Recommended Transfer Status: Assistive equipment (Comment), Assist of 1  OT - OK to Discharge: Yes    Subjective   Previous Visit Info:  OT Last Visit  OT Received On: 02/04/25  General:  General  Reason for Referral: decline in ADLs, dehydration  Referred By: Dr. Knowles  Past Medical History Relevant to Rehab: DM with neuropathy, PVD, HTN, hyperlipidemia  Family/Caregiver Present: No  Prior to Session Communication: Bedside nurse  Patient Position Received: Bed, 3 rail up, Alarm on  Preferred Learning Style: verbal, visual  General Comment: Patient cleared by nursing for therapy. Patient in bed upon arrival and agreeable to  participate.  Precautions:  Hearing/Visual Limitations: + glasses  Medical Precautions: Fall precautions     Date/Time Vitals Session Patient Position Pulse Resp SpO2 BP MAP (mmHg)    02/04/25 0935 Pre PT  Sitting  66  --  --  93/48  59           Vital Signs Comment: Pre OT in bed BP 92/54 (MAP 67), in the chair 87/53 (MAP 63). post OT 87/49 in the chair (MAP 60), RN aware    Pain Assessment:  Pain Assessment  Pain Assessment: 0-10  0-10 (Numeric) Pain Score: 0 - No pain     Objective   Cognition:  Cognition Comments: pleasant and cooperative  Impulsive: Mildly  Activities of Daily Living: Grooming  Grooming Level of Assistance: Setup  Grooming Where Assessed: Chair  Grooming Comments: wash face and brush teeth    UE Bathing  UE Bathing Level of Assistance: Setup  UE Bathing Where Assessed: Other (Comment) (bedside chair)  UE Bathing Comments: UB sponge bath    LE Bathing  LE Bathing Level of Assistance: Minimum assistance  LE Bathing Where Assessed: Other (Comment) (bedside chair)  LE Bathing Comments: LB sponge bath, assistance to wash buttocks    UE Dressing  UE Dressing Level of Assistance: Minimum assistance  UE Dressing Where Assessed: Chair  UE Dressing Comments: doff/don gown    LE Dressing  Pants Level of Assistance: Minimum assistance  Sock Level of Assistance: Moderate assistance  LE Dressing Where Assessed: Chair  LE Dressing Comments: assist to don pants over hips in standing. patient able to don left sock without assist, assist to don right sock    Bed Mobility/Transfers: Bed Mobility  Bed Mobility: Yes  Bed Mobility 1  Bed Mobility 1: Supine to sitting  Level of Assistance 1: Close supervision  Bed Mobility Comments 1: head of bed elevated    Transfers  Transfer: Yes  Transfer 1  Transfer From 1: Bed to  Transfer to 1: Stand  Technique 1: Sit to stand  Transfer Device 1: Walker  Transfer Level of Assistance 1: Contact guard  Transfers 2  Transfer From 2: Chair with arms to  Transfer to 2:  Stand  Technique 2: Sit to stand  Transfer Device 2: Walker  Transfer Level of Assistance 2: Contact guard  Trials/Comments 2: x2 trials    Functional Mobility:  Functional Mobility  Functional Mobility Performed: Yes  Functional Mobility 1  Surface 1: Level tile  Device 1: Standard walker  Assistance 1: Contact guard  Comments 1: ~3 steps to the chair  Sitting Balance:  Static Sitting Balance  Static Sitting-Balance Support: Feet supported, Bilateral upper extremity supported  Static Sitting-Level of Assistance: Close supervision  Standing Balance:  Static Standing Balance  Static Standing-Balance Support: Bilateral upper extremity supported  Static Standing-Level of Assistance: Contact guard  Static Standing-Comment/Number of Minutes: for safety due to dizziness    Outcome Measures:  [facility] Daily Activity  Putting on and taking off regular lower body clothing: A lot  Bathing (including washing, rinsing, drying): A lot  Putting on and taking off regular upper body clothing: A little  Toileting, which includes using toilet, bedpan or urinal: A lot  Taking care of personal grooming such as brushing teeth: None  Eating Meals: None  Daily Activity - Total Score: 17    Education Documentation  ADL Training, taught by Shruti Taylor OT at 2/4/2025 10:48 AM.  Learner: Patient  Readiness: Acceptance  Method: Demonstration, Explanation  Response: Needs Reinforcement, Verbalizes Understanding  Comment: education on safe completion of ADLs    Education Comments  No comments found.      Goals:  Encounter Problems       Encounter Problems (Active)       OT Goals       ADLS (Progressing)       Start:  01/22/25    Expected End:  02/05/25       Patient will complete ADL tasks, with modified independence using AE need in order to increase patient's safety and independence with self-care tasks.           Functional Transfers (Progressing)       Start:  01/22/25    Expected End:  02/05/25       Patient will complete functional  transfers with modified independence using least restrictive device, in order to increase patient's safety and independence with daily tasks.           Activity Tolerance (Progressing)       Start:  01/22/25    Expected End:  02/05/25       Patient will demonstrate the ability to participate in functional activity at least >/= 20 minutes in order to increase patient's safety and independence with daily tasks.

## 2025-02-04 NOTE — PROGRESS NOTES
Devin Gomez is a 64 y.o. male on day 14 of admission presenting with Dehydration.      Subjective   Sitting up in a chair blood pressure was somewhat low but he has no symptoms  He did receive a.m. metoprolol       Objective     Last Recorded Vitals  BP (!) 83/47 (BP Location: Right arm, Patient Position: Sitting)   Pulse 61   Temp 36.4 °C (97.5 °F) (Temporal)   Resp 11   Wt 62 kg (136 lb 11 oz)   SpO2 95%   Intake/Output last 3 Shifts:    Intake/Output Summary (Last 24 hours) at 2/4/2025 1247  Last data filed at 2/4/2025 0400  Gross per 24 hour   Intake --   Output 1781 ml   Net -1781 ml       Physical Exam  Alert oriented x 3 cooperative no distress  Chest clear  Heart regular  Abdomen soft nontender  Extremities no edema  Neurologic exam nonfocal  Relevant Results  Reviewed a.m. labs  Assessment/Plan     Assessment & Plan  Dehydration  -resolved  Syncope and collapse  -unclear syncope vs seizure activity   -CT without acute change, MRI showing small vessel ischemic changes and 7mm subcortical parietal infarct that is either acute or subacute  -neurology consulted and signed off, advised improved BG control, LDL at goal, continue aspirin  -EEG without epileptiform discharges  NSTEMI (non-ST elevated myocardial infarction) (Multi)  -cardiology evaluated, troponins downtrended  -no further cardiac work-up indicated per 01/30 note  Diabetic ketoacidosis without coma associated with type 2 diabetes mellitus (Multi)  -continue SSI  Pancolitis (Multi)  -improving  Ambulatory dysfunction  -PT/OT    February 4,    Seems that he is hyperglycemic again; will recheck BMP will give fluid bolus; will increase Lantus             Ismael Knowles MD

## 2025-02-04 NOTE — CARE PLAN
The patient's goals for the shift include  discharge    The clinical goals for the shift include remain hemodynamically stable    Over the shift, the patient did not make progress toward the following goals. Barriers to progression include none. Recommendations to address these barriers include none.

## 2025-02-04 NOTE — PROGRESS NOTES
Physical Therapy    Physical Therapy Treatment    Patient Name: Devin Gomez  MRN: [id number]  Department: [facility] 4 ICU  Room: [address] [place], [place] [address]  Today's Date: 2/4/2025  Time Calculation  Start Time: 0935  Stop Time: 1010  Time Calculation (min): 35 min         Assessment/Plan   PT Assessment  PT Assessment Results: Decreased strength, Decreased endurance, Impaired balance, Decreased mobility, Decreased coordination, Decreased safety awareness, Pain  Rehab Prognosis: Good  Barriers to Discharge Home: Physical needs, Caregiver assistance  Caregiver Assistance: Patient lives alone and/or does not have reliable caregiver assistance  Physical Needs: Intermittent mobility assistance needed, High falls risk due to function or environment, Other (Comment)  Evaluation/Treatment Tolerance: Other (Comment) (tolerated fairly, limited by decrease in BP)  Medical Staff Made Aware: Yes  Strengths: Premorbid level of function  Barriers to Participation: Comorbidities  End of Session Communication: Bedside nurse, PCT/NA/CTA  Assessment Comment: Pt gives good effort, low Lt hip pain, activity limited by decrease in BP when OOB this session. Cues for safe mobility technique  End of Session Patient Position: Bed, 3 rail up, Alarm on  PT Plan  Inpatient/Swing Bed or Outpatient: Inpatient  PT Plan  Treatment/Interventions: Bed mobility, Transfer training, Gait training, Balance training, Strengthening, Endurance training, Therapeutic exercise, Therapeutic activity  PT Plan: Ongoing PT  PT Frequency: 4 times per week (increased session frequency  to promote increased activity level w/ extended hospital stay)  PT Discharge Recommendations: Moderate intensity level of continued care  Equipment Recommended upon Discharge: Wheeled walker, Standard walker  PT Recommended Transfer Status: Assist x1, Assistive device  PT - OK to Discharge: Yes      General Visit Information:   PT  Visit  PT Received On: 02/04/25  General  Reason for Referral: impaired  mobility, dehydration  Referred By: Dr. Knowles  Past Medical History Relevant to Rehab: DM with neuropathy, PVD, HTN, hyperlipidemia  Family/Caregiver Present: No  Prior to Session Communication: Bedside nurse  Patient Position Received: Bed, 3 rail up, Alarm on  Preferred Learning Style: verbal, visual  General Comment: Pt agreeable to PT session, reports elevated blood sugar earlier. (Additional session requested for updated notes.)    Subjective   Precautions:  Precautions  Hearing/Visual Limitations: + glasses  Medical Precautions: Fall precautions     Date/Time Vitals Session Patient Position Pulse Resp SpO2 BP MAP (mmHg)    02/04/25 0935 Pre PT  Sitting  66  --  --  93/48  59           Vital Signs Comment: Above  reading is post amb w/ c/o mild lightheadedness. Retake after a couple minutes in chair: 92/50 (64), no longer lightheaded; after ~ 15 in chair 77/46 (56), not lightheaded. Return to supine, HOB elevated part way per pt request, 92/54 (67), no complaints following transfer. Nurse and PCA informed     Objective   Pain:  Pain Assessment  Pain Assessment: 0-10  0-10 (Numeric) Pain Score: 0 - No pain (up to 2/10 w/ activity)  Pain Type: Chronic pain  Pain Location: Hip  Pain Orientation: Left  Cognition:  Cognition  Overall Cognitive Status: Within Functional Limits  Arousal/Alertness: Appropriate responses to stimuli  Following Commands: Follows multistep commands without difficulty  Cognition Comments: pleasant, cooperative, a little quiet today       Postural Control:  Dynamic Standing Balance  Dynamic Standing-Balance Support: Bilateral upper extremity supported  Dynamic Standing-Level of Assistance: Contact guard  Dynamic Standing-Balance: Turning (amb)  Dynamic Standing-Comments: Fair (c/o mild lightheadedness toward end of amb)  Activity Tolerance:  Activity Tolerance  Endurance: Tolerates less than 10 min exercise with changes in vital signs (decreased BP)  Activity Tolerance Comments: Fair-  (limited by decrease in BP)  Treatments:  Therapeutic Exercise  Therapeutic Exercise Activity 1: pt performed seated bilat ankle pumps (limited Lt), LAQs, hip flexion, glute sets, debra hip add and abduction x 15, Lt hip tolerating well.    Bed Mobility 1  Bed Mobility 1: Supine to sitting  Level of Assistance 1: Close supervision  Bed Mobility Comments 1: to Rt EOB w/ HOB elevated part way; pt moves quickly  Bed Mobility 2  Bed Mobility  2: Sitting to supine  Level of Assistance 2: Close supervision  Bed Mobility Comments 2: no difficulty, moves quickly    Ambulation/Gait Training 1  Surface 1: Level tile  Device 1: Standard walker  Assistance 1: Contact guard  Comments/Distance (ft) 1: Pt amb ~ 20' x 1, forward flexed posture, weight shifted anteriorly onto forefoot, increased UE supports on UEs. Cues to not place posterior LEs of walker too far ahead but all 4 legs down for better  control and position in walker. Pt reported feeling a little lightheaded toward end of amb, BP had dropped, see readings in vitals section.  Transfer 1  Technique 1: Sit to stand, Stand to sit  Transfer Device 1: Walker  Transfer Level of Assistance 1: Contact guard, Minimum assistance  Trials/Comments 1: from/to EOB, to from chair w/ arms, cues for safe hand placement, as well as keeping walker close and in use until ready to sit down.    Outcome Measures:  [facility] Basic Mobility  Turning from your back to your side while in a flat bed without using bedrails: A little  Moving from lying on your back to sitting on the side of a flat bed without using bedrails: A little  Moving to and from bed to chair (including a wheelchair): A little  Standing up from a chair using your arms (e.g. wheelchair or bedside chair): A little  To walk in hospital room: A lot  Climbing 3-5 steps with railing: A lot  Basic Mobility - Total Score: 16    Education Documentation  Precautions, taught by Carla Beck, PT at 2/4/2025 10:35 AM.  Learner:  Patient  Readiness: Acceptance  Method: Explanation, Demonstration  Response: Verbalizes Understanding, Needs Reinforcement  Comment: safe technique    Home Exercise Program, taught by Carla Beck, PT at 2/4/2025 10:35 AM.  Learner: Patient  Readiness: Acceptance  Method: Explanation, Demonstration  Response: Verbalizes Understanding, Needs Reinforcement  Comment: safe technique    Mobility Training, taught by Carla Beck, PT at 2/4/2025 10:35 AM.  Learner: Patient  Readiness: Acceptance  Method: Explanation, Demonstration  Response: Verbalizes Understanding, Needs Reinforcement  Comment: safe technique    Education Comments  No comments found.        OP EDUCATION:       Encounter Problems       Encounter Problems (Active)       Balance       dynamic (Progressing)       Start:  01/22/25    Expected End:  02/07/25       Pt will perform Tinetti assessment and score >/= 24/28, resulting in a low falls risk             Mobility       LTG - Patient will be able to go up and down a curb/step with the appropriate device (Progressing)       Start:  01/22/25    Expected End:  02/07/25            LTG - Patient will navigate 4-6 steps with rails/device (Progressing)       Start:  01/22/25    Expected End:  02/07/25            ambulation (Progressing)       Start:  01/22/25    Expected End:  02/07/25       Pt will amb > 100 ft with wheeled walker/LRAD and mod independence          endurance (Progressing)       Start:  01/22/25    Expected End:  02/07/25       Pt will tolerate > 20 minutes of activity with stable vital signs and decreased c/o SOB/nausea.            PT Transfers       sit to stand (Progressing)       Start:  01/22/25    Expected End:  02/07/25       Pt will perform sit to stand transfer with LRAD and mod independence.             Safety       LTG - Patient will utilize safety techniques (Progressing)       Start:  01/22/25    Expected End:  02/07/25

## 2025-02-05 VITALS
OXYGEN SATURATION: 97 % | TEMPERATURE: 98.6 F | HEIGHT: 69 IN | HEART RATE: 71 BPM | BODY MASS INDEX: 20.9 KG/M2 | RESPIRATION RATE: 16 BRPM | WEIGHT: 141.09 LBS | DIASTOLIC BLOOD PRESSURE: 66 MMHG | SYSTOLIC BLOOD PRESSURE: 123 MMHG

## 2025-02-05 LAB
ALBUMIN SERPL BCP-MCNC: 2.8 G/DL (ref 3.4–5)
ALP SERPL-CCNC: 58 U/L (ref 33–136)
ALT SERPL W P-5'-P-CCNC: 24 U/L (ref 10–52)
ANION GAP SERPL CALCULATED.3IONS-SCNC: 9 MMOL/L (ref 10–20)
AST SERPL W P-5'-P-CCNC: 16 U/L (ref 9–39)
BASOPHILS # BLD AUTO: 0.06 X10*3/UL (ref 0–0.1)
BASOPHILS NFR BLD AUTO: 1.2 %
BILIRUB SERPL-MCNC: 0.4 MG/DL (ref 0–1.2)
BUN SERPL-MCNC: 18 MG/DL (ref 6–23)
CALCIUM SERPL-MCNC: 7.7 MG/DL (ref 8.6–10.3)
CHLORIDE SERPL-SCNC: 101 MMOL/L (ref 98–107)
CO2 SERPL-SCNC: 28 MMOL/L (ref 21–32)
CREAT SERPL-MCNC: 1.31 MG/DL (ref 0.5–1.3)
EGFRCR SERPLBLD CKD-EPI 2021: 60 ML/MIN/1.73M*2
EOSINOPHIL # BLD AUTO: 0.15 X10*3/UL (ref 0–0.7)
EOSINOPHIL NFR BLD AUTO: 3.1 %
ERYTHROCYTE [DISTWIDTH] IN BLOOD BY AUTOMATED COUNT: 14.7 % (ref 11.5–14.5)
GLUCOSE BLD MANUAL STRIP-MCNC: 187 MG/DL (ref 74–99)
GLUCOSE BLD MANUAL STRIP-MCNC: 193 MG/DL (ref 74–99)
GLUCOSE SERPL-MCNC: 132 MG/DL (ref 74–99)
HCT VFR BLD AUTO: 25.9 % (ref 41–52)
HGB BLD-MCNC: 8.8 G/DL (ref 13.5–17.5)
IMM GRANULOCYTES # BLD AUTO: 0.02 X10*3/UL (ref 0–0.7)
IMM GRANULOCYTES NFR BLD AUTO: 0.4 % (ref 0–0.9)
LYMPHOCYTES # BLD AUTO: 1.54 X10*3/UL (ref 1.2–4.8)
LYMPHOCYTES NFR BLD AUTO: 31.8 %
MCH RBC QN AUTO: 29.2 PG (ref 26–34)
MCHC RBC AUTO-ENTMCNC: 34 G/DL (ref 32–36)
MCV RBC AUTO: 86 FL (ref 80–100)
MONOCYTES # BLD AUTO: 0.41 X10*3/UL (ref 0.1–1)
MONOCYTES NFR BLD AUTO: 8.5 %
NEUTROPHILS # BLD AUTO: 2.67 X10*3/UL (ref 1.2–7.7)
NEUTROPHILS NFR BLD AUTO: 55 %
NRBC BLD-RTO: 0 /100 WBCS (ref 0–0)
PLATELET # BLD AUTO: 198 X10*3/UL (ref 150–450)
POTASSIUM SERPL-SCNC: 3.8 MMOL/L (ref 3.5–5.3)
PROT SERPL-MCNC: 4.5 G/DL (ref 6.4–8.2)
RBC # BLD AUTO: 3.01 X10*6/UL (ref 4.5–5.9)
SODIUM SERPL-SCNC: 134 MMOL/L (ref 136–145)
WBC # BLD AUTO: 4.9 X10*3/UL (ref 4.4–11.3)

## 2025-02-05 PROCEDURE — 2500000002 HC RX 250 W HCPCS SELF ADMINISTERED DRUGS (ALT 637 FOR MEDICARE OP, ALT 636 FOR OP/ED): Performed by: INTERNAL MEDICINE

## 2025-02-05 PROCEDURE — 36415 COLL VENOUS BLD VENIPUNCTURE: CPT | Performed by: INTERNAL MEDICINE

## 2025-02-05 PROCEDURE — 97110 THERAPEUTIC EXERCISES: CPT | Mod: GO

## 2025-02-05 PROCEDURE — 2500000001 HC RX 250 WO HCPCS SELF ADMINISTERED DRUGS (ALT 637 FOR MEDICARE OP): Performed by: INTERNAL MEDICINE

## 2025-02-05 PROCEDURE — 97530 THERAPEUTIC ACTIVITIES: CPT | Mod: GO

## 2025-02-05 PROCEDURE — 80053 COMPREHEN METABOLIC PANEL: CPT | Performed by: INTERNAL MEDICINE

## 2025-02-05 PROCEDURE — 99239 HOSP IP/OBS DSCHRG MGMT >30: CPT | Performed by: INTERNAL MEDICINE

## 2025-02-05 PROCEDURE — 2500000004 HC RX 250 GENERAL PHARMACY W/ HCPCS (ALT 636 FOR OP/ED): Performed by: INTERNAL MEDICINE

## 2025-02-05 PROCEDURE — 85025 COMPLETE CBC W/AUTO DIFF WBC: CPT | Performed by: INTERNAL MEDICINE

## 2025-02-05 PROCEDURE — 2500000002 HC RX 250 W HCPCS SELF ADMINISTERED DRUGS (ALT 637 FOR MEDICARE OP, ALT 636 FOR OP/ED): Performed by: REGISTERED NURSE

## 2025-02-05 PROCEDURE — 82947 ASSAY GLUCOSE BLOOD QUANT: CPT

## 2025-02-05 PROCEDURE — 2500000002 HC RX 250 W HCPCS SELF ADMINISTERED DRUGS (ALT 637 FOR MEDICARE OP, ALT 636 FOR OP/ED): Performed by: STUDENT IN AN ORGANIZED HEALTH CARE EDUCATION/TRAINING PROGRAM

## 2025-02-05 RX ORDER — ACETAMINOPHEN 500 MG
5 TABLET ORAL NIGHTLY PRN
Start: 2025-02-05

## 2025-02-05 RX ORDER — ATORVASTATIN CALCIUM 40 MG/1
40 TABLET, FILM COATED ORAL NIGHTLY
Start: 2025-02-05

## 2025-02-05 RX ORDER — NAPROXEN SODIUM 220 MG/1
81 TABLET, FILM COATED ORAL DAILY
Start: 2025-02-06 | End: 2025-03-08

## 2025-02-05 RX ORDER — PANTOPRAZOLE SODIUM 40 MG/1
40 TABLET, DELAYED RELEASE ORAL DAILY
Start: 2025-02-05

## 2025-02-05 RX ORDER — DOCUSATE SODIUM 100 MG/1
100 CAPSULE, LIQUID FILLED ORAL 2 TIMES DAILY
Start: 2025-02-05

## 2025-02-05 RX ORDER — INSULIN LISPRO 100 [IU]/ML
10 INJECTION, SOLUTION INTRAVENOUS; SUBCUTANEOUS
Start: 2025-02-05 | End: 2025-02-10 | Stop reason: SDUPTHER

## 2025-02-05 RX ORDER — METOPROLOL TARTRATE 25 MG/1
25 TABLET, FILM COATED ORAL 2 TIMES DAILY
Start: 2025-02-05

## 2025-02-05 RX ORDER — POLYETHYLENE GLYCOL 3350 17 G/17G
17 POWDER, FOR SOLUTION ORAL DAILY
Start: 2025-02-05

## 2025-02-05 RX ORDER — INSULIN GLARGINE 100 [IU]/ML
15 INJECTION, SOLUTION SUBCUTANEOUS EVERY 24 HOURS
Start: 2025-02-05 | End: 2025-02-10 | Stop reason: SDUPTHER

## 2025-02-05 RX ORDER — INSULIN LISPRO 100 [IU]/ML
0-10 INJECTION, SOLUTION INTRAVENOUS; SUBCUTANEOUS
Start: 2025-02-05

## 2025-02-05 RX ORDER — TAMSULOSIN HYDROCHLORIDE 0.4 MG/1
0.8 CAPSULE ORAL
Start: 2025-02-06

## 2025-02-05 RX ORDER — ACETAMINOPHEN 325 MG/1
650 TABLET ORAL EVERY 4 HOURS PRN
Start: 2025-02-05

## 2025-02-05 RX ADMIN — INSULIN LISPRO 2 UNITS: 100 INJECTION, SOLUTION INTRAVENOUS; SUBCUTANEOUS at 08:48

## 2025-02-05 RX ADMIN — PANTOPRAZOLE SODIUM 40 MG: 40 INJECTION, POWDER, FOR SOLUTION INTRAVENOUS at 08:46

## 2025-02-05 RX ADMIN — TAMSULOSIN HYDROCHLORIDE 0.8 MG: 0.4 CAPSULE ORAL at 06:03

## 2025-02-05 RX ADMIN — INSULIN LISPRO 10 UNITS: 100 INJECTION, SOLUTION INTRAVENOUS; SUBCUTANEOUS at 08:52

## 2025-02-05 RX ADMIN — ASPIRIN 81 MG CHEWABLE TABLET 81 MG: 81 TABLET CHEWABLE at 08:44

## 2025-02-05 RX ADMIN — HEPARIN SODIUM 5000 UNITS: 5000 INJECTION, SOLUTION INTRAVENOUS; SUBCUTANEOUS at 06:03

## 2025-02-05 ASSESSMENT — PAIN - FUNCTIONAL ASSESSMENT
PAIN_FUNCTIONAL_ASSESSMENT: 0-10

## 2025-02-05 ASSESSMENT — COGNITIVE AND FUNCTIONAL STATUS - GENERAL
DRESSING REGULAR UPPER BODY CLOTHING: A LITTLE
HELP NEEDED FOR BATHING: A LOT
DAILY ACTIVITIY SCORE: 17
DRESSING REGULAR LOWER BODY CLOTHING: A LOT
TOILETING: A LOT

## 2025-02-05 ASSESSMENT — PAIN SCALES - GENERAL
PAINLEVEL_OUTOF10: 0 - NO PAIN

## 2025-02-05 ASSESSMENT — ACTIVITIES OF DAILY LIVING (ADL): HOME_MANAGEMENT_TIME_ENTRY: 6

## 2025-02-05 NOTE — CARE PLAN
The patient's goals for the shift include      The clinical goals for the shift include discharge        1130 report called to [facility], all questions answered, transport scheduled for 1230    1345 transport arrived. Pt discharged

## 2025-02-05 NOTE — PROGRESS NOTES
Occupational Therapy    Occupational Therapy Treatment    Name: Devin Gomez  MRN: [id number]  Department: [facility] 4 ICU  Room: [address] [place] [address]  Date: 02/05/25  Time Calculation  Start Time: 1020  Stop Time: 1046  Time Calculation (min): 26 min    Assessment:  OT Assessment: Patient will continue to benefit from skilled OT to maximize safety and independence with daily tasks.  Prognosis: Good  Barriers to Discharge Home: Caregiver assistance, Physical needs  Caregiver Assistance: Patient lives alone and/or does not have reliable caregiver assistance  Physical Needs: Intermittent mobility assistance needed, Intermittent ADL assistance needed  Evaluation/Treatment Tolerance: Patient limited by fatigue  End of Session Communication: Bedside nurse  End of Session Patient Position: Up in chair, Alarm on  Plan:  Treatment Interventions: ADL retraining, Functional transfer training, UE strengthening/ROM, Endurance training, Patient/family training, Compensatory technique education, Equipment evaluation/education  OT Frequency: 3 times per week  OT Discharge Recommendations: Moderate intensity level of continued care  Equipment Recommended upon Discharge: Standard walker  OT Recommended Transfer Status: Assistive equipment (Comment), Assist of 1  OT - OK to Discharge: Yes    Subjective   Previous Visit Info:  OT Last Visit  OT Received On: 02/05/25  General:  General  Reason for Referral: decline in ADLs, dehydration  Referred By: Dr. Knowles  Past Medical History Relevant to Rehab: DM with neuropathy, PVD, HTN, hyperlipidemia  Family/Caregiver Present: No  Prior to Session Communication: Bedside nurse  Patient Position Received: Up in chair, Alarm on  Preferred Learning Style: verbal, visual  General Comment: Patient is cleared by nursing for therapy. Patient seated in the chair upon arrival and agreeable to participate.  Precautions:  Hearing/Visual Limitations: + glasses  Medical Precautions: Fall precautions     Date/Time  Vitals Session Patient Position Pulse Resp SpO2 BP MAP (mmHg)    02/05/25 1004 --  --  72  16  --  104/65  76     02/05/25 1010 --  --  72  16  --  95/52  66           Vital Signs Comment: Pre OT in the chair BP 95/52 (MAP 66), standing /59 (MAP 73), post mobility seated in the chair BP 95/59 (70)    Pain Assessment:  Pain Assessment  Pain Assessment: 0-10  0-10 (Numeric) Pain Score: 0 - No pain     Objective   Cognition:  Cognition Comments: flat affect, able to follow commands  Impulsive: Mildly  Activities of Daily Living: Grooming  Grooming Level of Assistance: Setup  Grooming Where Assessed: Chair  Grooming Comments: brush teeth    Bed Mobility/Transfers: Transfers  Transfer: Yes  Transfer 1  Transfer From 1: Chair with arms to  Transfer to 1: Stand  Technique 1: Sit to stand  Transfer Device 1: Walker  Transfer Level of Assistance 1: Contact guard    Functional Mobility:  Functional Mobility  Functional Mobility Performed: Yes  Functional Mobility 1  Surface 1: Level tile  Device 1: Standard walker  Assistance 1: Contact guard  Comments 1: < household distances    Standing Balance:  Static Standing Balance  Static Standing-Balance Support: Bilateral upper extremity supported  Static Standing-Level of Assistance: Contact guard    Therapy/Activity: Therapeutic Exercise  Therapeutic Exercise Activity 1: patient is seated in the chair and completes 1x15 B UE exercises. 1# hand weight B UE bicep curls and chest press. 2# L UE and AAROM R UE for shoulder flex/ext, shoulder horizontal abduction/adduction, shoulder elevation/depression, scapular protraction/retraction, and shoulder reverse rolls. therapeutic rest breaks for maximial muscle benefits    Therapeutic Activity  Therapeutic Activity 1: patient completes functional mobility < household distances    RUE   RUE : Exceptions to WFL (decreased shoulder) and LUE   LUE: Within Functional Limits    Outcome Measures:  [facility] Daily Activity  Putting on and taking  off regular lower body clothing: A lot  Bathing (including washing, rinsing, drying): A lot  Putting on and taking off regular upper body clothing: A little  Toileting, which includes using toilet, bedpan or urinal: A lot  Taking care of personal grooming such as brushing teeth: None  Eating Meals: None  Daily Activity - Total Score: 17    Education Documentation  No documentation found.  Education Comments  No comments found.      Goals:  Encounter Problems       Encounter Problems (Active)       OT Goals       ADLS (Progressing)       Start:  01/22/25    Expected End:  02/12/25       Patient will complete ADL tasks, with modified independence using AE need in order to increase patient's safety and independence with self-care tasks.           Functional Transfers (Progressing)       Start:  01/22/25    Expected End:  02/12/25       Patient will complete functional transfers with modified independence using least restrictive device, in order to increase patient's safety and independence with daily tasks.           Activity Tolerance (Progressing)       Start:  01/22/25    Expected End:  02/12/25       Patient will demonstrate the ability to participate in functional activity at least >/= 20 minutes in order to increase patient's safety and independence with daily tasks.

## 2025-02-05 NOTE — CARE PLAN
Problem: Diabetes  Goal: Achieve decreasing blood glucose levels by end of shift  Outcome: Progressing  Goal: Increase stability of blood glucose readings by end of shift  Outcome: Progressing  Goal: Decrease in ketones present in urine by end of shift  Outcome: Progressing  Goal: Maintain electrolyte levels within acceptable range throughout shift  Outcome: Progressing  Goal: Maintain glucose levels >70mg/dl to <250mg/dl throughout shift  Outcome: Progressing  Goal: No changes in neurological exam by end of shift  Outcome: Progressing  Goal: Learn about and adhere to nutrition recommendations by end of shift  Outcome: Progressing  Goal: Vital signs within normal range for age by end of shift  Outcome: Progressing  Goal: Increase self care and/or family involovement by end of shift  Outcome: Progressing  Goal: Receive DSME education by end of shift  Outcome: Progressing     Problem: Safety - Adult  Goal: Free from fall injury  Outcome: Progressing     Problem: Discharge Planning  Goal: Discharge to home or other facility with appropriate resources  Outcome: Progressing     Problem: Chronic Conditions and Co-morbidities  Goal: Patient's chronic conditions and co-morbidity symptoms are monitored and maintained or improved  Outcome: Progressing     Problem: Fall/Injury  Goal: Not fall by end of shift  Outcome: Progressing  Goal: Be free from injury by end of the shift  Outcome: Progressing  Goal: Verbalize understanding of personal risk factors for fall in the hospital  Outcome: Progressing  Goal: Verbalize understanding of risk factor reduction measures to prevent injury from fall in the home  Outcome: Progressing  Goal: Use assistive devices by end of the shift  Outcome: Progressing  Goal: Pace activities to prevent fatigue by end of the shift  Outcome: Progressing     Problem: Pain  Goal: Takes deep breaths with improved pain control throughout the shift  Outcome: Progressing  Goal: Turns in bed with improved pain  control throughout the shift  Outcome: Progressing  Goal: Walks with improved pain control throughout the shift  Outcome: Progressing  Goal: Performs ADL's with improved pain control throughout shift  Outcome: Progressing  Goal: Participates in PT with improved pain control throughout the shift  Outcome: Progressing  Goal: Free from opioid side effects throughout the shift  Outcome: Progressing  Goal: Free from acute confusion related to pain meds throughout the shift  Outcome: Progressing   The patient's goals for the shift include      The clinical goals for the shift include Pt wants to be straight cathed    Over the shift, the patient did not make progress toward the following goals. Barriers to progression include . Recommendations to address these barriers include .

## 2025-02-05 NOTE — DISCHARGE SUMMARY
Discharge Diagnosis  Patient Active Problem List   Diagnosis    Dehydration    NSTEMI (non-ST elevated myocardial infarction) (Multi)    Diabetic ketoacidosis without coma associated with type 2 diabetes mellitus (Multi)    Pancolitis (Multi)    Vomiting    Bladder retention    Ambulatory dysfunction    Syncope and collapse     Diabetic ketoacidosis  Nausea vomiting  Orthostatic hypotension  Possible NSTEMI  Possible stroke  Convulsive syncope  Bladder retention  Chronic kidney disease stage III    Issues Requiring Follow-Up  Rehab and follow-up with all physicians as outlined    Discharge Meds     Your medication list        START taking these medications        Instructions Last Dose Given Next Dose Due   acetaminophen 325 mg tablet  Commonly known as: Tylenol      Take 2 tablets (650 mg) by mouth every 4 hours if needed for mild pain (1 - 3), moderate pain (4 - 6), headaches or fever (temp greater than 38.0 C).       aspirin 81 mg chewable tablet  Start taking on: February 6, 2025      Chew 1 tablet (81 mg) once daily.       atorvastatin 40 mg tablet  Commonly known as: Lipitor      Take 1 tablet (40 mg) by mouth once daily at bedtime.       docusate sodium 100 mg capsule  Commonly known as: Colace      Take 1 capsule (100 mg) by mouth 2 times a day.       insulin glargine 100 unit/mL injection  Commonly known as: Lantus      Inject 15 Units under the skin once every 24 hours. Take as directed per insulin instructions.       insulin lispro 100 unit/mL injection      Inject 0-10 Units under the skin 3 times a day before meals. Take as directed per insulin instructions.       insulin lispro 100 unit/mL injection      Inject 10 Units under the skin 3 times a day before meals. Take as directed per insulin instructions.       melatonin 5 mg tablet      Take 1 tablet (5 mg) by mouth as needed at bedtime for sleep.       metoprolol tartrate 25 mg tablet  Commonly known as: Lopressor      Take 1 tablet (25 mg) by mouth 2  "times a day.       pantoprazole 40 mg EC tablet  Commonly known as: ProtoNix      Take 1 tablet (40 mg) by mouth once daily. Do not crush, chew, or split.       polyethylene glycol 17 gram packet  Commonly known as: Glycolax, Miralax      Take 17 g by mouth once daily.       tamsulosin 0.4 mg 24 hr capsule  Commonly known as: Flomax  Start taking on: February 6, 2025      Take 2 capsules (0.8 mg) by mouth once daily in the morning. Take before meals.              CONTINUE taking these medications        Instructions Last Dose Given Next Dose Due   insulin syringe-needle U-100 31G X 5/16\" 0.5 mL syringe  Commonly known as: BD Insulin Syringe Ultra-Fine      USE THREE TIMES DAILY 90 DAY(S)       True Metrix Glucose Test Strip strip  Generic drug: blood sugar diagnostic      TEST 4 TIMES DAILY              STOP taking these medications      gabapentin 100 mg capsule  Commonly known as: Neurontin        insulin aspart 100 unit/mL injection  Commonly known as: NovoLOG U-100 Insulin aspart        NovoLIN N NPH U-100 Insulin 100 unit/mL injection  Generic drug: insulin NPH (Isophane)        triamcinolone 0.1 % cream  Commonly known as: Kenalog                  Where to Get Your Medications        Information about where to get these medications is not yet available    Ask your nurse or doctor about these medications  acetaminophen 325 mg tablet  aspirin 81 mg chewable tablet  atorvastatin 40 mg tablet  docusate sodium 100 mg capsule  insulin glargine 100 unit/mL injection  insulin lispro 100 unit/mL injection  insulin lispro 100 unit/mL injection  melatonin 5 mg tablet  metoprolol tartrate 25 mg tablet  pantoprazole 40 mg EC tablet  polyethylene glycol 17 gram packet  tamsulosin 0.4 mg 24 hr capsule         Test Results Pending At Discharge  Pending Labs       Order Current Status    Extra Urine Gray Tube Collected (01/24/25 1513)    Extra Urine Gray Tube Collected (01/21/25 1441)    Urinalysis with Reflex Culture and " "Microscopic Collected (01/24/25 1513)    Urinalysis with Reflex Culture and Microscopic In process            Hospital Course  This patient had a fairly prolonged hospital stay.  He presented with significant nausea vomiting and tachycardia obvious dehydration it was felt that he is in borderline DKA.  Initially he was attempted to be treated with IV insulin boluses but eventually had to go on a drip to reverse his acidosis.  It took several days of gradual improvement of his nausea and vomiting Reglan did help at the time.      He was seen by GI who eventually did upper and lower endoscopy biopsies were taken initially CAT scan suggested colitis although he never had any diarrhea or abdominal pain.  He will follow-up with GI for the results of these biopsies    As far as his diabetes he was transitioned to Lantus and sliding scale insulin it took a little adjustment but finally this is more or less stable.    There was transient elevation of his troponins initially for which he underwent a stress test there was no evidence for heart failure.  Cardiology recommends outpatient follow-up.  The patient remains somewhat orthostatic so we will decrease the dose of the beta-blocker.  He will be discharged on aspirin and Lipitor and beta-blocker    The patient did have an episode when he had a syncopal event with possible seizure he was seen evaluation again by cardiology and neurology for that MRI was done that showed evidence of probably old stroke.  The event was labeled as \"convulsive syncope\" by the neurologist recommended aspirin Lipitor and outpatient follow-up    And finally the patient did have obvious bladder retention from the first day of admission he consistently refused a Villegas catheter and he preferred intermittent catheterization.  He had multiple attempts at improving his urination ability and he could not.  He was seen by urology who started Flomax and at this point of time planus to continue intermittent " catheterization 3 times a day follow-up with urology continue Flomax for now    Pertinent Physical Exam At Time of Discharge  I saw the patient today in room 417.  He is sitting up in a chair working with physical therapy he is on room air he appears in absolutely no distress  Normocephalic/atraumatic EOMI PERRLA  Neck supple chest clear  Heart regular  Abdomen soft nontender extremities no edema      Labs from today reviewed creatinine is about 1.3 which is slightly above baseline  Outpatient Follow-Up  As outlined in the chart    Time spent on discharge arrangement:  45 minutes    Ismael Knowles MD

## 2025-02-05 NOTE — PROGRESS NOTES
02/05/25 0831   Discharge Planning   Home or Post Acute Services Post acute facilities (Rehab/SNF/etc)   Type of Post Acute Facility Services Skilled nursing  (Accepted by [facility].  Per Kuldip, auth approved.  Patient can d/c when arrangements are made.)   Expected Discharge Disposition SNF   Does the patient need discharge transport arranged? Yes   RoundTrip coordination needed? Yes   Has discharge transport been arranged? No     1120  Transportation confirmed for 1230 via [employer].

## 2025-02-10 DIAGNOSIS — R55 SYNCOPE AND COLLAPSE: ICD-10-CM

## 2025-02-10 RX ORDER — INSULIN LISPRO 100 [IU]/ML
10 INJECTION, SOLUTION INTRAVENOUS; SUBCUTANEOUS
Qty: 10 ML | Refills: 1 | Status: SHIPPED | OUTPATIENT
Start: 2025-02-10

## 2025-02-10 RX ORDER — INSULIN GLARGINE 100 [IU]/ML
15 INJECTION, SOLUTION SUBCUTANEOUS EVERY 24 HOURS
Qty: 10 ML | Refills: 1 | Status: SHIPPED | OUTPATIENT
Start: 2025-02-10

## 2025-02-11 ENCOUNTER — PATIENT OUTREACH (OUTPATIENT)
Dept: PRIMARY CARE | Facility: CLINIC | Age: 65
End: 2025-02-11
Payer: COMMERCIAL

## 2025-02-11 DIAGNOSIS — E08.00 DIABETES MELLITUS DUE TO UNDERLYING CONDITION WITH HYPEROSMOLARITY WITHOUT COMA, WITHOUT LONG-TERM CURRENT USE OF INSULIN: ICD-10-CM

## 2025-02-11 RX ORDER — SENNOSIDES/DOCUSATE SODIUM 8.6MG-50MG
TABLET ORAL
Qty: 300 STRIP | Refills: 2 | Status: SHIPPED | OUTPATIENT
Start: 2025-02-11

## 2025-02-11 NOTE — PROGRESS NOTES
Discharge Facility: [facility]  Discharge Diagnosis: Syncope and collapse  Admission Date: 02/05/2025  Discharge Date: 02/07/2025    PCP Appointment Date: 02/19/2025, scheduled by this   Specialist Appointment Date: None  Hospital Encounter and Summary Linked: No    See discharge assessment below for further details    Medications  Medications reviewed with patient/caregiver?: Yes (2/11/2025  9:26 AM)  Is the patient having any side effects they believe may be caused by any medication additions or changes?: No (2/11/2025  9:26 AM)  Does the patient have all medications ordered at discharge?: Yes (2/11/2025  9:26 AM)  Prescription Comments: Patient states that he has needed medication (2/11/2025  9:26 AM)  Is the patient taking all medications as directed (includes completed medication regime)?: Yes (2/11/2025  9:26 AM)  Medication Comments: Patient denies issues affording medication (2/11/2025  9:26 AM)    Appointments  Does the patient have a primary care provider?: Yes (2/11/2025  9:26 AM)  Care Management Interventions: -- (02/19/2025, scheduled by this ) (2/11/2025  9:26 AM)  Has the patient kept scheduled appointments due by today?: Yes (2/11/2025  9:26 AM)    Self Management  What is the home health agency?: N/A (2/11/2025  9:26 AM)  What Durable Medical Equipment (DME) was ordered?: N/A (2/11/2025  9:26 AM)    Patient Teaching  Does the patient have access to their discharge instructions?: Yes (2/11/2025  9:26 AM)  Care Management Interventions: Reviewed instructions with patient (2/11/2025  9:26 AM)  What is the patient's perception of their health status since discharge?: Improving (2/11/2025  9:26 AM)  Is the patient/caregiver able to teach back the hierarchy of who to call/visit for symptoms/problems? PCP, Specialist, Home Health nurse, Urgent Care, ED, 911: Yes (2/11/2025  9:26 AM)  Patient/Caregiver Education Comments: CM spoke to patient via phone. He states that he is  doing well at home. He has all needed medications at the home at this time. PCP follow up appointment has been scheduled by this CM for 02/19/2025. He is requesting a refill of True Metrix test strips to be sent to his pharmacy. Message request will be sent to the office. He has no questions at this time and was very thankful for this call and assistance. (2/11/2025  9:26 AM)

## 2025-02-12 DIAGNOSIS — R55 SYNCOPE AND COLLAPSE: ICD-10-CM

## 2025-02-19 ENCOUNTER — APPOINTMENT (OUTPATIENT)
Dept: PRIMARY CARE | Facility: CLINIC | Age: 65
End: 2025-02-19
Payer: COMMERCIAL

## 2025-02-25 ENCOUNTER — PATIENT OUTREACH (OUTPATIENT)
Dept: PRIMARY CARE | Facility: CLINIC | Age: 65
End: 2025-02-25
Payer: COMMERCIAL

## 2025-02-25 NOTE — PROGRESS NOTES
14 day call to patient after hospitalization.  At time of outreach call the patient feels as if their condition has improved since last visit.

## 2025-02-27 ENCOUNTER — OFFICE VISIT (OUTPATIENT)
Dept: PRIMARY CARE | Facility: CLINIC | Age: 65
End: 2025-02-27
Payer: COMMERCIAL

## 2025-02-27 VITALS
SYSTOLIC BLOOD PRESSURE: 120 MMHG | HEIGHT: 69 IN | OXYGEN SATURATION: 98 % | TEMPERATURE: 98.6 F | RESPIRATION RATE: 20 BRPM | DIASTOLIC BLOOD PRESSURE: 82 MMHG | BODY MASS INDEX: 20.29 KG/M2 | WEIGHT: 137 LBS | HEART RATE: 71 BPM

## 2025-02-27 DIAGNOSIS — Z79.4 TYPE 2 DIABETES MELLITUS WITHOUT COMPLICATION, WITH LONG-TERM CURRENT USE OF INSULIN (MULTI): Primary | ICD-10-CM

## 2025-02-27 DIAGNOSIS — I21.4 NSTEMI (NON-ST ELEVATED MYOCARDIAL INFARCTION) (MULTI): ICD-10-CM

## 2025-02-27 DIAGNOSIS — R33.9 URINARY RETENTION: ICD-10-CM

## 2025-02-27 DIAGNOSIS — E11.9 TYPE 2 DIABETES MELLITUS WITHOUT COMPLICATION, WITH LONG-TERM CURRENT USE OF INSULIN (MULTI): Primary | ICD-10-CM

## 2025-02-27 DIAGNOSIS — K51.00 PANCOLITIS (MULTI): ICD-10-CM

## 2025-02-27 DIAGNOSIS — E11.10 DIABETIC KETOACIDOSIS WITHOUT COMA ASSOCIATED WITH TYPE 2 DIABETES MELLITUS (MULTI): ICD-10-CM

## 2025-02-27 DIAGNOSIS — R55 SYNCOPE AND COLLAPSE: ICD-10-CM

## 2025-02-27 PROCEDURE — 99214 OFFICE O/P EST MOD 30 MIN: CPT | Performed by: FAMILY MEDICINE

## 2025-02-27 PROCEDURE — 3008F BODY MASS INDEX DOCD: CPT | Performed by: FAMILY MEDICINE

## 2025-02-27 PROCEDURE — 3074F SYST BP LT 130 MM HG: CPT | Performed by: FAMILY MEDICINE

## 2025-02-27 PROCEDURE — 3079F DIAST BP 80-89 MM HG: CPT | Performed by: FAMILY MEDICINE

## 2025-02-27 PROCEDURE — 3048F LDL-C <100 MG/DL: CPT | Performed by: FAMILY MEDICINE

## 2025-02-27 PROCEDURE — 1111F DSCHRG MED/CURRENT MED MERGE: CPT | Performed by: FAMILY MEDICINE

## 2025-02-27 PROCEDURE — 1159F MED LIST DOCD IN RCRD: CPT | Performed by: FAMILY MEDICINE

## 2025-02-27 PROCEDURE — 1036F TOBACCO NON-USER: CPT | Performed by: FAMILY MEDICINE

## 2025-02-27 PROCEDURE — 1126F AMNT PAIN NOTED NONE PRSNT: CPT | Performed by: FAMILY MEDICINE

## 2025-02-27 PROCEDURE — 3046F HEMOGLOBIN A1C LEVEL >9.0%: CPT | Performed by: FAMILY MEDICINE

## 2025-02-27 RX ORDER — DEXTROSE 4 G
TABLET,CHEWABLE ORAL
COMMUNITY
Start: 2017-09-19

## 2025-02-27 RX ORDER — CALCIUM CITRATE/VITAMIN D3 200MG-6.25
TABLET ORAL EVERY 24 HOURS
COMMUNITY
Start: 2016-08-27

## 2025-02-27 RX ORDER — TAMSULOSIN HYDROCHLORIDE 0.4 MG/1
0.8 CAPSULE ORAL
Qty: 90 CAPSULE | Refills: 3 | Status: SHIPPED | OUTPATIENT
Start: 2025-02-27

## 2025-02-27 ASSESSMENT — PATIENT HEALTH QUESTIONNAIRE - PHQ9
2. FEELING DOWN, DEPRESSED OR HOPELESS: NOT AT ALL
SUM OF ALL RESPONSES TO PHQ9 QUESTIONS 1 AND 2: 0
1. LITTLE INTEREST OR PLEASURE IN DOING THINGS: NOT AT ALL

## 2025-02-27 ASSESSMENT — ENCOUNTER SYMPTOMS
LOSS OF SENSATION IN FEET: 0
DEPRESSION: 0
OCCASIONAL FEELINGS OF UNSTEADINESS: 0

## 2025-02-27 ASSESSMENT — PAIN SCALES - GENERAL: PAINLEVEL_OUTOF10: 0-NO PAIN

## 2025-02-27 NOTE — PROGRESS NOTES
"Subjective   Patient ID: Devin Gomez is a 64 y.o. male who presents for Follow-up (HERE FOR HOSPITAL FOLLOW UP HE WAS ADMITTED TO [facility] FOR COLITIS).    HPI pt admitted 1/21-2/5 with NSTEMI/dehydration/ketoacidosis/pancolitis. After that went into rehab for a couple of weeks.    Hospital course noted as follows:    This patient had a fairly prolonged hospital stay.  He presented with significant nausea vomiting and tachycardia obvious dehydration it was felt that he is in borderline DKA.  Initially he was attempted to be treated with IV insulin boluses but eventually had to go on a drip to reverse his acidosis.  It took several days of gradual improvement of his nausea and vomiting Reglan did help at the time.       He was seen by GI who eventually did upper and lower endoscopy biopsies were taken initially CAT scan suggested colitis although he never had any diarrhea or abdominal pain.  He will follow-up with GI for the results of these biopsies     As far as his diabetes he was transitioned to Lantus and sliding scale insulin it took a little adjustment but finally this is more or less stable.     There was transient elevation of his troponins initially for which he underwent a stress test there was no evidence for heart failure.  Cardiology recommends outpatient follow-up.  The patient remains somewhat orthostatic so we will decrease the dose of the beta-blocker.  He will be discharged on aspirin and Lipitor and beta-blocker     The patient did have an episode when he had a syncopal event with possible seizure he was seen evaluation again by cardiology and neurology for that MRI was done that showed evidence of probably old stroke.  The event was labeled as \"convulsive syncope\" by the neurologist recommended aspirin Lipitor and outpatient follow-up     And finally the patient did have obvious bladder retention from the first day of admission he consistently refused a Villegas catheter and he preferred " "intermittent catheterization.  He had multiple attempts at improving his urination ability and he could not.  He was seen by urology who started Flomax and at this point of time planus to continue intermittent catheterization 3 times a day follow-up with urology continue Flomax for now    Currently on 15 units Lantus in am and Lispro sliding scale.  He would like to hold off on CGM currently but will consider.   Review of Systems    Objective   /82 (BP Location: Right arm, Patient Position: Sitting, BP Cuff Size: Adult)   Pulse 71   Temp 37 °C (98.6 °F) (Skin)   Resp 20   Ht 1.753 m (5' 9\")   Wt 62.1 kg (137 lb)   SpO2 98%   BMI 20.23 kg/m²     Physical Exam  Constitutional:       General: He is not in acute distress.     Appearance: Normal appearance.   Cardiovascular:      Rate and Rhythm: Normal rate and regular rhythm.      Heart sounds: No murmur heard.  Pulmonary:      Breath sounds: Normal breath sounds. No wheezing.   Neurological:      Mental Status: He is alert.         Assessment/Plan   Problem List Items Addressed This Visit             ICD-10-CM    NSTEMI (non-ST elevated myocardial infarction) (Multi) I21.4    Relevant Orders    Referral to Cardiology    Diabetic ketoacidosis without coma associated with type 2 diabetes mellitus (Multi) E11.10    Pancolitis (Multi) K51.00    Syncope and collapse R55    Relevant Medications    tamsulosin (Flomax) 0.4 mg 24 hr capsule     Other Visit Diagnoses         Codes    Type 2 diabetes mellitus without complication, with long-term current use of insulin (Multi)    -  Primary E11.9, Z79.4    Urinary retention     R33.9    Relevant Orders    Referral to Urology        He will continue to keep log and recheck ov 2 months.          "

## 2025-03-18 ENCOUNTER — PATIENT OUTREACH (OUTPATIENT)
Dept: PRIMARY CARE | Facility: CLINIC | Age: 65
End: 2025-03-18
Payer: COMMERCIAL

## 2025-04-02 DIAGNOSIS — R55 SYNCOPE AND COLLAPSE: ICD-10-CM

## 2025-04-02 RX ORDER — METOPROLOL TARTRATE 25 MG/1
TABLET, FILM COATED ORAL
Qty: 180 TABLET | Refills: 2 | Status: SHIPPED | OUTPATIENT
Start: 2025-04-02

## 2025-04-06 DIAGNOSIS — R55 SYNCOPE AND COLLAPSE: ICD-10-CM

## 2025-04-07 RX ORDER — INSULIN GLARGINE 100 [IU]/ML
INJECTION, SOLUTION SUBCUTANEOUS
Qty: 10 ML | Refills: 1 | Status: SHIPPED | OUTPATIENT
Start: 2025-04-07

## 2025-04-23 ENCOUNTER — PATIENT OUTREACH (OUTPATIENT)
Dept: PRIMARY CARE | Facility: CLINIC | Age: 65
End: 2025-04-23
Payer: COMMERCIAL

## 2025-06-05 DIAGNOSIS — R55 SYNCOPE AND COLLAPSE: ICD-10-CM

## 2025-06-05 RX ORDER — INSULIN GLARGINE 100 [IU]/ML
INJECTION, SOLUTION SUBCUTANEOUS
Qty: 10 ML | Refills: 1 | Status: SHIPPED | OUTPATIENT
Start: 2025-06-05

## 2025-07-31 DIAGNOSIS — R55 SYNCOPE AND COLLAPSE: ICD-10-CM

## 2025-08-02 RX ORDER — INSULIN GLARGINE 100 [IU]/ML
INJECTION, SOLUTION SUBCUTANEOUS
Qty: 10 ML | Refills: 3 | Status: SHIPPED | OUTPATIENT
Start: 2025-08-02

## 2025-08-08 DIAGNOSIS — Z79.4 TYPE 2 DIABETES MELLITUS WITHOUT COMPLICATION, WITH LONG-TERM CURRENT USE OF INSULIN: Primary | ICD-10-CM

## 2025-08-08 DIAGNOSIS — E11.9 TYPE 2 DIABETES MELLITUS WITHOUT COMPLICATION, WITH LONG-TERM CURRENT USE OF INSULIN: Primary | ICD-10-CM

## 2025-08-08 RX ORDER — CALCIUM CITRATE/VITAMIN D3 200MG-6.25
TABLET ORAL
Qty: 300 STRIP | Refills: 2 | Status: SHIPPED | OUTPATIENT
Start: 2025-08-08

## 2025-08-23 DIAGNOSIS — R55 SYNCOPE AND COLLAPSE: ICD-10-CM

## 2025-08-25 RX ORDER — TAMSULOSIN HYDROCHLORIDE 0.4 MG/1
0.8 CAPSULE ORAL
Qty: 90 CAPSULE | Refills: 3 | Status: SHIPPED | OUTPATIENT
Start: 2025-08-25